# Patient Record
Sex: MALE | Race: WHITE
[De-identification: names, ages, dates, MRNs, and addresses within clinical notes are randomized per-mention and may not be internally consistent; named-entity substitution may affect disease eponyms.]

---

## 2019-07-12 ENCOUNTER — HOSPITAL ENCOUNTER (OUTPATIENT)
Dept: HOSPITAL 63 - DXRAD | Age: 54
Discharge: HOME | End: 2019-07-12
Payer: COMMERCIAL

## 2019-07-12 DIAGNOSIS — I87.8: ICD-10-CM

## 2019-07-12 DIAGNOSIS — M25.851: ICD-10-CM

## 2019-07-12 DIAGNOSIS — M12.852: ICD-10-CM

## 2019-07-12 DIAGNOSIS — M12.851: Primary | ICD-10-CM

## 2019-07-12 PROCEDURE — 73501 X-RAY EXAM HIP UNI 1 VIEW: CPT

## 2019-07-12 NOTE — RAD
One view the pelvis and one view right hip

 

HISTORY: Right hip pain

 

COMPARISON: None

 

FINDINGS:

 

The iliopectineal line is intact. Joint space narrowing of both hips are 

noted greater on the right with subchondral sclerosis and cyst formation 

seen mostly on the right. No acute fracture or dislocation. Phleboliths 

are present.

 

IMPRESSION:

 

No acute fracture or dislocation

 

Bilateral hip arthropathy greater on the right

 

Electronically signed by: Dallas Rausch MD (7/12/2019 4:36 PM) [facility]

## 2019-07-17 NOTE — [facility]
Examination: Fluoro Guided Therapeutic injection right hip.  

 

History: Right hip pain

 

Findings:

 

Relative benefits risks and alternatives to the procedure were discussed 

and verbal and written informed consent was obtained.  The patient was 

carefully prepped and draped in a sterile fashion.  Lidocaine was used for

local anesthesia. A 22-gauge spinal needle was  advanced to the level of 

the hip joint at the femoral neck. A mixture of 4 mL of lidocaine, 4 mL of

Omnipaque 300,  4ml bupivacaine and 80 mg of Depo-Medrol was injected.

 

Total fluoroscopic time 23 seconds. Total fluoroscopic images 1.

 

No immediate complications.

 

IMPRESSION:

 

Therapeutic right hip steroid injection.

 

Electronically signed by: Humza Perez MD (7/17/2019 3:33 PM) [id number]

## 2020-08-25 NOTE — KC[facility]
Examination: Fluoro Guided Therapeutic injection bilateral hips 

 

History: Bilateral hip pain

 

COMPARISON: 7/17/2019

 

Findings:

 

 

Relative benefits risks and alternatives to the procedure were discussed 

and verbal and written informed consent was obtained.  The patient was 

carefully prepped and draped in a sterile fashion.  Lidocaine was used for

local anesthesia. A 22-gauge spinal needle was  advanced to the level of 

the hip joint at the femoral neck. A mixture of 40 Ramirez-Marco Antonio and, 4

mL of bupivacaine, 4 mL of Omnipaque, 80 mg of Depo-Medrol was injected in

the right and left hip joints. Digital image was obtained  showing 

contrast in the joint. The left hip injection joint image could not be 

saved.

 

No complications.

 

Impression:  

 

Therapeutic injection bilateral hips.  

 

48 seconds fluoroscopy for right hip and 23 seconds of fluoroscopy for 

left hip. 

 

1 fluoroscopic image.

 

Electronically signed by: Humza Perez MD (8/25/2020 5:02 PM) [id number]

## 2021-07-26 ENCOUNTER — HOSPITAL ENCOUNTER (INPATIENT)
Dept: HOSPITAL 63 - ER | Age: 56
LOS: 1 days | Discharge: ANOTHER HOSPITAL | DRG: 177 | End: 2021-07-27
Attending: HOSPITALIST | Admitting: HOSPITALIST
Payer: COMMERCIAL

## 2021-07-26 VITALS — SYSTOLIC BLOOD PRESSURE: 123 MMHG | DIASTOLIC BLOOD PRESSURE: 86 MMHG

## 2021-07-26 VITALS — DIASTOLIC BLOOD PRESSURE: 90 MMHG | SYSTOLIC BLOOD PRESSURE: 122 MMHG

## 2021-07-26 VITALS — DIASTOLIC BLOOD PRESSURE: 93 MMHG | SYSTOLIC BLOOD PRESSURE: 130 MMHG

## 2021-07-26 VITALS — WEIGHT: 230.38 LBS | BODY MASS INDEX: 31.2 KG/M2 | HEIGHT: 72 IN

## 2021-07-26 DIAGNOSIS — R43.2: ICD-10-CM

## 2021-07-26 DIAGNOSIS — J12.82: ICD-10-CM

## 2021-07-26 DIAGNOSIS — J96.01: ICD-10-CM

## 2021-07-26 DIAGNOSIS — M19.90: ICD-10-CM

## 2021-07-26 DIAGNOSIS — U07.1: Primary | ICD-10-CM

## 2021-07-26 DIAGNOSIS — Z90.49: ICD-10-CM

## 2021-07-26 LAB
ALBUMIN SERPL-MCNC: 3.2 G/DL (ref 3.4–5)
ALBUMIN/GLOB SERPL: 0.8 {RATIO} (ref 1–1.7)
ALP SERPL-CCNC: 60 U/L (ref 46–116)
ALT SERPL-CCNC: 49 U/L (ref 16–63)
ANION GAP SERPL CALC-SCNC: 12 MMOL/L (ref 6–14)
AST SERPL-CCNC: 61 U/L (ref 15–37)
BASOPHILS # BLD AUTO: 0 X10^3/UL (ref 0–0.2)
BASOPHILS NFR BLD: 0 % (ref 0–3)
BILIRUB SERPL-MCNC: 0.8 MG/DL (ref 0.2–1)
BUN/CREAT SERPL: 18 (ref 6–20)
CA-I SERPL ISE-MCNC: 20 MG/DL (ref 8–26)
CALCIUM SERPL-MCNC: 8.6 MG/DL (ref 8.5–10.1)
CHLORIDE SERPL-SCNC: 101 MMOL/L (ref 98–107)
CO2 SERPL-SCNC: 25 MMOL/L (ref 21–32)
CREAT SERPL-MCNC: 1.1 MG/DL (ref 0.7–1.3)
EOSINOPHIL NFR BLD: 0 % (ref 0–3)
EOSINOPHIL NFR BLD: 0 X10^3/UL (ref 0–0.7)
ERYTHROCYTE [DISTWIDTH] IN BLOOD BY AUTOMATED COUNT: 13.2 % (ref 11.5–14.5)
GFR SERPLBLD BASED ON 1.73 SQ M-ARVRAT: 69.2 ML/MIN
GLOBULIN SER-MCNC: 4 G/DL (ref 2.2–3.8)
GLUCOSE SERPL-MCNC: 135 MG/DL (ref 70–99)
HCT VFR BLD CALC: 46.9 % (ref 39–53)
HGB BLD-MCNC: 16.3 G/DL (ref 13–17.5)
LYMPHOCYTES # BLD: 0.5 X10^3/UL (ref 1–4.8)
LYMPHOCYTES NFR BLD AUTO: 6 % (ref 24–48)
MCH RBC QN AUTO: 32 PG (ref 25–35)
MCHC RBC AUTO-ENTMCNC: 35 G/DL (ref 31–37)
MCV RBC AUTO: 93 FL (ref 79–100)
MONO #: 0.3 X10^3/UL (ref 0–1.1)
MONOCYTES NFR BLD: 4 % (ref 0–9)
NEUT #: 6.6 X10^3UL (ref 1.8–7.7)
NEUTROPHILS NFR BLD AUTO: 90 % (ref 31–73)
PLATELET # BLD AUTO: 169 X10^3/UL (ref 140–400)
POTASSIUM SERPL-SCNC: 3.4 MMOL/L (ref 3.5–5.1)
PROT SERPL-MCNC: 7.2 G/DL (ref 6.4–8.2)
RBC # BLD AUTO: 5.06 X10^6/UL (ref 4.3–5.7)
SODIUM SERPL-SCNC: 138 MMOL/L (ref 136–145)
WBC # BLD AUTO: 7.4 X10^3/UL (ref 4–11)

## 2021-07-26 PROCEDURE — 87040 BLOOD CULTURE FOR BACTERIA: CPT

## 2021-07-26 PROCEDURE — 71045 X-RAY EXAM CHEST 1 VIEW: CPT

## 2021-07-26 PROCEDURE — 80053 COMPREHEN METABOLIC PANEL: CPT

## 2021-07-26 PROCEDURE — 36415 COLL VENOUS BLD VENIPUNCTURE: CPT

## 2021-07-26 PROCEDURE — 85025 COMPLETE CBC W/AUTO DIFF WBC: CPT

## 2021-07-26 PROCEDURE — 96374 THER/PROPH/DIAG INJ IV PUSH: CPT

## 2021-07-26 PROCEDURE — 83605 ASSAY OF LACTIC ACID: CPT

## 2021-07-26 PROCEDURE — 82803 BLOOD GASES ANY COMBINATION: CPT

## 2021-07-26 PROCEDURE — U0003 INFECTIOUS AGENT DETECTION BY NUCLEIC ACID (DNA OR RNA); SEVERE ACUTE RESPIRATORY SYNDROME CORONAVIRUS 2 (SARS-COV-2) (CORONAVIRUS DISEASE [COVID-19]), AMPLIFIED PROBE TECHNIQUE, MAKING USE OF HIGH THROUGHPUT TECHNOLOGIES AS DESCRIBED BY CMS-2020-01-R: HCPCS

## 2021-07-26 RX ADMIN — FAMOTIDINE SCH MG: 10 INJECTION, SOLUTION INTRAVENOUS at 16:21

## 2021-07-26 RX ADMIN — ZINC SULFATE CAP 220 MG (50 MG ELEMENTAL ZN) SCH MG: 220 (50 ZN) CAP at 16:19

## 2021-07-26 RX ADMIN — Medication SCH MG: at 16:19

## 2021-07-26 RX ADMIN — IPRATROPIUM BROMIDE AND ALBUTEROL SCH PUFF: 20; 100 SPRAY, METERED RESPIRATORY (INHALATION) at 21:14

## 2021-07-26 RX ADMIN — ENOXAPARIN SODIUM SCH MG: 100 INJECTION SUBCUTANEOUS at 16:19

## 2021-07-26 RX ADMIN — METHYLPREDNISOLONE SODIUM SUCCINATE SCH MG: 125 INJECTION, POWDER, FOR SOLUTION INTRAMUSCULAR; INTRAVENOUS at 21:15

## 2021-07-26 RX ADMIN — ALBUTEROL SULFATE PRN PUFF: 90 AEROSOL, METERED RESPIRATORY (INHALATION) at 21:15

## 2021-07-26 RX ADMIN — FAMOTIDINE SCH MG: 10 INJECTION, SOLUTION INTRAVENOUS at 21:14

## 2021-07-26 RX ADMIN — CETIRIZINE HYDROCHLORIDE SCH MG: 10 TABLET, FILM COATED ORAL at 16:19

## 2021-07-26 RX ADMIN — ACETAMINOPHEN PRN MG: 325 TABLET, FILM COATED ORAL at 16:19

## 2021-07-26 NOTE — HP
ADMIT DATE: 07/26/2021

ATTENDING PHYSICIAN:  Dr. Hagan.



CHIEF COMPLAINT:  Shortness of breath.



HISTORY OF PRESENT ILLNESS:  The patient is a 56-year-old gentleman, otherwise 

healthy, daughter has been ill for the last 4 days.  He has had cough, 

congestion, fevers, shortness of breath, and nonproductive cough.  He tested 

positive for most likely the delta variant coronavirus.  He had a flu-like 

symptom last 03/2020 and he thought it was COVID at that time.  He has not had 

his vaccinations.  He is a nonsmoker and nondrinker.  In the ED, the workup 

showed that he was hypoxic.  He required 2 liters of nasal cannula to maintain 

sats of 93%.  The chest x-ray showed evidence of mild ill-defined mid and 

bibasilar opacities consistent with a viral pneumonia.  He is admitted then with

COVID pneumonia and acute respiratory failure.



PAST MEDICAL HISTORY:  Significant for some degenerative arthritis.  He has had 

an appendectomy.  No history of heart disease.



SOCIAL HISTORY:  Nonsmoker and nondrinker.  He is retired, working for the 

[employer].  He is an avid mehran.  He is in good shape otherwise.



CURRENT MEDICATIONS:  None.



ALLERGIES:  No known drug allergies.



FAMILY HISTORY:  He is .  Children are grown.  He has 5 kids.



REVIEW OF SYSTEMS:  Significant for the myalgias.  He has lost sense of taste, 

low-grade fevers, congestion, shortness of breath and dyspnea with minimal 

exertion.



PHYSICAL EXAMINATION:

GENERAL:  When I saw him, this is a pleasant gentleman who was fairly alert.  He

did not appear to be toxic.

VITAL SIGNS:  His initial vital signs showed a blood pressure of 122/90, pulse 

is 83 and regular, temperature 99.1 degrees Fahrenheit, oxygen saturation 94% on

2 liters by nasal cannula.

HEENT:  Head is without trauma.  Pupils are reactive.  Sclerae nonicteric.  

Oropharynx is clear.

NECK:  Supple.  No bruits identified.

LUNGS:  Otherwise, he has good breath sounds.  He has diminished breath sounds 

at the bases with some rhonchi.

CARDIOVASCULAR:  Showed regular heart tones.  No gallops.

ABDOMEN:  Soft.

EXTREMITIES:  Without edema.

NEUROLOGIC:  Focally intact.

SKIN:  Warm and dry.



PERTINENT LABORATORY STUDIES:  His hemoglobin was 16.3 g/dL with a white count 

of 7400.  Potassium was 3.4 mEq, sodium 138 mEq, nonfasting blood sugar is 135 

mg/dL.



ASSESSMENT:

1.  A 56-year-old gentleman with COVID-19 coronavirus pneumonia bilaterally.

2.  Hypoxemia, requiring some supplemental oxygen.

3.  Dysgeusia.



PLAN:

1.  Admit to the inpatient unit.

2.  Empiric Solu-Medrol.

3.  Remdesivir has been shown not to be beneficial and will not be considered.

4.  Empiric Lovenox.

5.  Elemental zinc.

6.  Nebulizer therapy.

7.  Diet as tolerated.







CALLI

DR: Armand   DD: 07/26/2021 16:26

DT: 07/26/2021 16:36   TID: [id number]

CC:     Clive Bell

## 2021-07-26 NOTE — RAD
XR CHEST 1V



History: Reason: sob, covid exposure / Spl. Instructions:  / History: 



Comparison: None.



Findings:

Mild ill-defined mid and bibasilar opacities. No pleural effusion. No pneumothorax. Normal heart size
.



Impression: 

1.  Mild ill-defined mid and bibasilar opacities, can be seen with viral pneumonia.



Electronically signed by: Ramone Mcneal DO (7/26/2021 11:46 AM) [id number]

## 2021-07-26 NOTE — PHYS DOC
Past History


Past Surgical History:  Appendectomy





General Adult


EDM:


Chief Complaint:  SHORTNESS OF BREATH





HPI:


HPI:





Patient is a 56 year old male who presents with a week of fever/chills, 

diaphoresis, generalized fatigue, myalgias, headache, cough.  Over the past few 

days has developed worsening shortness of breath.  Was exposed to his son who 

tested positive for Covid.  He had a negative rapid test on day 1 of symptoms, 

and has not been tested again.  Prehospital had an oxygen saturation of 82%, so 

was brought into the emergency department for further evaluation.  Denies any 

chest pain.





Review of Systems:


Review of Systems:


Constitutional:  + fever, chills, myalgias, diaphoresis.  


Eyes:  Denies change in visual acuity 


HENT:  Denies nasal congestion or sore throat 


Respiratory:  + cough and SOB 


Cardiovascular:  Denies chest pain or edema 


GI:  + Diarrhea. Denies abdominal pain, nausea, vomiting, bloody stools or 

diarrhea 


: Denies dysuria 


Musculoskeletal:  Denies back pain or joint pain 


Integument:  Denies rash 


Neurologic:  Denies headache, focal weakness or sensory changes 


Endocrine:  Denies polyuria or polydipsia 


Lymphatic:  Denies swollen glands 


Psychiatric:  Denies depression or anxiety





Family History:


Family History:


Sinus positive for Covid last week.  No other pertinent family history.





Allergies:


Allergies:





Allergies








Coded Allergies Type Severity Reaction Last Updated Verified


 


  No Known Drug Allergies    21 No











Physical Exam:


PE:





Constitutional: Diaphoretic and ill-appearing.. []


HENT: Normocephalic, atraumatic, bilateral external ears normal, oropharynx 

moist, no oral exudates, nose normal. []


Eyes: PERRLA, EOMI, conjunctiva normal, no discharge. [] 


Neck: Normal range of motion, no tenderness, supple, no stridor. [] 


Cardiovascular:Heart rate regular rhythm, no murmur []


Lungs & Thorax: Tachypneic.  Bilateral crackles.  []


Abdomen: Bowel sounds normal, soft, no tenderness, no masses, no pulsatile 

masses. [] 


Skin: Warm, dry, no erythema, no rash. [] 


Back: No tenderness, no CVA tenderness. [] 


Extremities: No tenderness, no cyanosis, no clubbing, ROM intact, no edema. [] 


Neurologic: Alert and oriented X 3, normal motor function, normal sensory 

function, no focal deficits noted. []


Psychologic: Affect normal, judgement normal, mood normal. []





Current Patient Data:


Labs:





                                Laboratory Tests








Test


 21


09:50


 


White Blood Count


 7.4 x10^3/uL


(4.0-11.0)


 


Red Blood Count


 5.06 x10^6/uL


(4.30-5.70)


 


Hemoglobin


 16.3 g/dL


(13.0-17.5)


 


Hematocrit


 46.9 %


(39.0-53.0)


 


Mean Corpuscular Volume


 93 fL ()





 


Mean Corpuscular Hemoglobin 32 pg (25-35)  


 


Mean Corpuscular Hemoglobin


Concent 35 g/dL


(31-37)


 


Red Cell Distribution Width


 13.2 %


(11.5-14.5)


 


Platelet Count


 169 x10^3/uL


(140-400)


 


Neutrophils (%) (Auto) 90 % (31-73)  H


 


Lymphocytes (%) (Auto) 6 % (24-48)  L


 


Monocytes (%) (Auto) 4 % (0-9)  


 


Eosinophils (%) (Auto) 0 % (0-3)  


 


Basophils (%) (Auto) 0 % (0-3)  


 


Neutrophils # (Auto)


 6.6 x10^3uL


(1.8-7.7)


 


Lymphocytes # (Auto)


 0.5 x10^3/uL


(1.0-4.8)  L


 


Monocytes # (Auto)


 0.3 x10^3/uL


(0.0-1.1)


 


Eosinophils # (Auto)


 0.0 x10^3/uL


(0.0-0.7)


 


Basophils # (Auto)


 0.0 x10^3/uL


(0.0-0.2)


 


Sodium Level


 138 mmol/L


(136-145)


 


Potassium Level


 3.4 mmol/L


(3.5-5.1)  L


 


Chloride Level


 101 mmol/L


()


 


Carbon Dioxide Level


 25 mmol/L


(21-32)


 


Anion Gap 12 (6-14)  


 


Blood Urea Nitrogen


 20 mg/dL


(8-26)


 


Creatinine


 1.1 mg/dL


(0.7-1.3)


 


Estimated GFR


(Cockcroft-Gault) 69.2  





 


BUN/Creatinine Ratio 18 (6-20)  


 


Glucose Level


 135 mg/dL


(70-99)  H


 


Lactic Acid Level


 1.6 mmol/L


(0.4-2.0)


 


Calcium Level


 8.6 mg/dL


(8.5-10.1)


 


Total Bilirubin


 0.8 mg/dL


(0.2-1.0)


 


Aspartate Amino Transferase


(AST) 61 U/L (15-37)


H


 


Alanine Aminotransferase (ALT)


 49 U/L (16-63)





 


Alkaline Phosphatase


 60 U/L


()


 


Total Protein


 7.2 g/dL


(6.4-8.2)


 


Albumin


 3.2 g/dL


(3.4-5.0)  L


 


Albumin/Globulin Ratio


 0.8 (1.0-1.7)


L








Vital Signs:





Laboratory Tests








Test


 21


09:50


 


White Blood Count 7.4 x10^3/uL 


 


Red Blood Count 5.06 x10^6/uL 


 


Hemoglobin 16.3 g/dL 


 


Hematocrit 46.9 % 


 


Mean Corpuscular Volume 93 fL 


 


Mean Corpuscular Hemoglobin 32 pg 


 


Mean Corpuscular Hemoglobin


Concent 35 g/dL 





 


Red Cell Distribution Width 13.2 % 


 


Platelet Count 169 x10^3/uL 


 


Neutrophils (%) (Auto) 90 % 


 


Lymphocytes (%) (Auto) 6 % 


 


Monocytes (%) (Auto) 4 % 


 


Eosinophils (%) (Auto) 0 % 


 


Basophils (%) (Auto) 0 % 


 


Neutrophils # (Auto) 6.6 x10^3uL 


 


Lymphocytes # (Auto) 0.5 x10^3/uL 


 


Monocytes # (Auto) 0.3 x10^3/uL 


 


Eosinophils # (Auto) 0.0 x10^3/uL 


 


Basophils # (Auto) 0.0 x10^3/uL 


 


Sodium Level 138 mmol/L 


 


Potassium Level 3.4 mmol/L 


 


Chloride Level 101 mmol/L 


 


Carbon Dioxide Level 25 mmol/L 


 


Anion Gap 12 


 


Blood Urea Nitrogen 20 mg/dL 


 


Creatinine 1.1 mg/dL 


 


Estimated GFR


(Cockcroft-Gault) 69.2 





 


BUN/Creatinine Ratio 18 


 


Glucose Level 135 mg/dL 


 


Lactic Acid Level 1.6 mmol/L 


 


Calcium Level 8.6 mg/dL 


 


Total Bilirubin 0.8 mg/dL 


 


Aspartate Amino Transf


(AST/SGOT) 61 U/L 





 


Alanine Aminotransferase


(ALT/SGPT) 49 U/L 





 


Alkaline Phosphatase 60 U/L 


 


Total Protein 7.2 g/dL 


 


Albumin 3.2 g/dL 


 


Albumin/Globulin Ratio 0.8 








Current Medications








 Medications


  (Trade)  Dose


 Ordered  Sig/Shayy


 Route


 PRN Reason  Start Time


 Stop Time Status Last Admin


Dose Admin


 


 Methylprednisolone


 Sodium Succinate


  (SOLU-Medrol


 40MG VIAL)  80 mg  1X  ONCE


 IV


   21 11:45


 21 11:47 DC 21 12:01











                                   Vital Signs








  Date Time  Temp Pulse Resp B/P (MAP) Pulse Ox O2 Delivery O2 Flow Rate FiO2


 


21 11:06  84 20 117/69 (85) 92 Nasal Cannula 2.0 


 


21 09:45 98.5       











EKG:


EKG:


[]





Radiology/Procedures:


Radiology/Procedures:


[facility] [address], [place] [address] [phone]


                                        


                                 IMAGING REPORT





                                     Signed





PATIENT: TYE ADDISON PACCOUNT: [id number]     MRN#: [id number]


: 1965           LOCATION: ER              AGE: 56


SEX: M                    EXAM DT: 21         ACCESSION#: [id number]


STATUS: REG ER            ORD. PHYSICIAN: NALDO MARTINEZ MD


REASON: sob, covid exposure


PROCEDURE: CHEST AP ONLY





XR CHEST 1V





History: Reason: sob, covid exposure / Spl. Instructions:  / History: 





Comparison: None.





Findings:


Mild ill-defined mid and bibasilar opacities. No pleural effusion. No 

pneumothorax. Normal heart size.





Impression: 


1.  Mild ill-defined mid and bibasilar opacities, can be seen with viral 

pneumonia.





Electronically signed by: Ramone Mcneal DO (2021 11:46 AM) [id number]














DICTATED AND SIGNED BY:     RAMONE MCNEAL DO


DATE:     21 1146





CC: NALDO MARTINEZ MD; SHERMAN SADLER MD ~MTH0 0


[]





Heart Score:


C/O Chest Pain:  N/A


Risk Factors:


Risk Factors:  DM, Current or recent (<one month) smoker, HTN, HLP, family 

history of CAD, obesity.


Risk Scores:


Score 0 - 3:  2.5% MACE over next 6 weeks - Discharge Home


Score 4 - 6:  20.3% MACE over next 6 weeks - Admit for Clinical Observation


Score 7 - 10:  72.7% MACE over next 6 weeks - Early Invasive Strategies





Course & Med Decision Making:


Course & Med Decision Making


Pertinent Labs and Imaging studies reviewed. (See chart for details)





Patient is an otherwise healthy 56-year-old male who presents with several days 

of symptoms consistent with COVID-19 pneumonia in the setting of exposure to 

household contact.  He is not vaccinated.  He is hypoxic to the 80s on room air 

and is requiring 2 L/min nasal cannula to maintain oxygenation.  I have given 

him IV steroids given his O2 requirement.  I have tested him for Covid.  His 

presentation is very consistent with a viral pneumonia, I have held off on 

antibiotics at this time.  He has been admitted to the hospitalist for further 

management.





Dragon Disclaimer:


Dragon Disclaimer:


This electronic medical record was generated, in whole or in part, using a voice

 recognition dictation system.





Departure


Departure:


Impression:  


   Primary Impression:  


   Viral pneumonia


   Additional Impression:  


   Suspected COVID-19 virus infection


Disposition:   ADMITTED AS INPATIENT


Admitting Physician:  Toni Hagan


Condition:  STABLE


Referrals:  


SHERMAN SADLER MD (PCP)











NALDO MARTINEZ MD              2021 11:46

## 2021-07-27 ENCOUNTER — HOSPITAL ENCOUNTER (INPATIENT)
Dept: HOSPITAL 61 - 2 SOUTH | Age: 56
LOS: 29 days | Discharge: HOME | DRG: 871 | End: 2021-08-25
Attending: INTERNAL MEDICINE | Admitting: INTERNAL MEDICINE
Payer: COMMERCIAL

## 2021-07-27 VITALS — DIASTOLIC BLOOD PRESSURE: 85 MMHG | SYSTOLIC BLOOD PRESSURE: 145 MMHG

## 2021-07-27 VITALS — HEIGHT: 72 IN | WEIGHT: 220.68 LBS | BODY MASS INDEX: 29.89 KG/M2

## 2021-07-27 VITALS — SYSTOLIC BLOOD PRESSURE: 137 MMHG | DIASTOLIC BLOOD PRESSURE: 81 MMHG

## 2021-07-27 VITALS — DIASTOLIC BLOOD PRESSURE: 89 MMHG | SYSTOLIC BLOOD PRESSURE: 149 MMHG

## 2021-07-27 VITALS — DIASTOLIC BLOOD PRESSURE: 85 MMHG | SYSTOLIC BLOOD PRESSURE: 126 MMHG

## 2021-07-27 VITALS — SYSTOLIC BLOOD PRESSURE: 154 MMHG | DIASTOLIC BLOOD PRESSURE: 90 MMHG

## 2021-07-27 VITALS — DIASTOLIC BLOOD PRESSURE: 77 MMHG | SYSTOLIC BLOOD PRESSURE: 116 MMHG

## 2021-07-27 DIAGNOSIS — M19.90: ICD-10-CM

## 2021-07-27 DIAGNOSIS — E66.9: ICD-10-CM

## 2021-07-27 DIAGNOSIS — D72.810: ICD-10-CM

## 2021-07-27 DIAGNOSIS — E87.6: ICD-10-CM

## 2021-07-27 DIAGNOSIS — J96.01: ICD-10-CM

## 2021-07-27 DIAGNOSIS — U07.1: ICD-10-CM

## 2021-07-27 DIAGNOSIS — Z79.899: ICD-10-CM

## 2021-07-27 DIAGNOSIS — E43: ICD-10-CM

## 2021-07-27 DIAGNOSIS — J12.82: ICD-10-CM

## 2021-07-27 DIAGNOSIS — A41.89: Primary | ICD-10-CM

## 2021-07-27 DIAGNOSIS — F41.9: ICD-10-CM

## 2021-07-27 LAB
BGAS PCO2: 38 MMHG (ref 35–46)
BGAS PH: 7.46 (ref 7.35–7.46)
BGAS PO2: 53 MMHG (ref 80–100)
DELTA BASE BGAS: 4 MMOL/L (ref 0–3)
O2 SAT BGAS: 89 % (ref 92–99)
O2/TOTAL GAS SETTING VFR VENT: 80 %

## 2021-07-27 PROCEDURE — 86140 C-REACTIVE PROTEIN: CPT

## 2021-07-27 PROCEDURE — G0378 HOSPITAL OBSERVATION PER HR: HCPCS

## 2021-07-27 PROCEDURE — 94760 N-INVAS EAR/PLS OXIMETRY 1: CPT

## 2021-07-27 PROCEDURE — 94618 PULMONARY STRESS TESTING: CPT

## 2021-07-27 PROCEDURE — 94660 CPAP INITIATION&MGMT: CPT

## 2021-07-27 PROCEDURE — 36415 COLL VENOUS BLD VENIPUNCTURE: CPT

## 2021-07-27 PROCEDURE — 82728 ASSAY OF FERRITIN: CPT

## 2021-07-27 PROCEDURE — 36600 WITHDRAWAL OF ARTERIAL BLOOD: CPT

## 2021-07-27 PROCEDURE — 82962 GLUCOSE BLOOD TEST: CPT

## 2021-07-27 PROCEDURE — 83880 ASSAY OF NATRIURETIC PEPTIDE: CPT

## 2021-07-27 PROCEDURE — 80053 COMPREHEN METABOLIC PANEL: CPT

## 2021-07-27 PROCEDURE — 5A0935A ASSISTANCE WITH RESPIRATORY VENTILATION, LESS THAN 24 CONSECUTIVE HOURS, HIGH NASAL FLOW/VELOCITY: ICD-10-PCS | Performed by: INTERNAL MEDICINE

## 2021-07-27 PROCEDURE — 85007 BL SMEAR W/DIFF WBC COUNT: CPT

## 2021-07-27 PROCEDURE — 82805 BLOOD GASES W/O2 SATURATION: CPT

## 2021-07-27 PROCEDURE — 5A0935A ASSISTANCE WITH RESPIRATORY VENTILATION, LESS THAN 24 CONSECUTIVE HOURS, HIGH NASAL FLOW/VELOCITY: ICD-10-PCS | Performed by: HOSPITALIST

## 2021-07-27 PROCEDURE — 71045 X-RAY EXAM CHEST 1 VIEW: CPT

## 2021-07-27 PROCEDURE — 80048 BASIC METABOLIC PNL TOTAL CA: CPT

## 2021-07-27 PROCEDURE — 85025 COMPLETE CBC W/AUTO DIFF WBC: CPT

## 2021-07-27 PROCEDURE — 85027 COMPLETE CBC AUTOMATED: CPT

## 2021-07-27 RX ADMIN — ALBUTEROL SULFATE PRN PUFF: 90 AEROSOL, METERED RESPIRATORY (INHALATION) at 12:11

## 2021-07-27 RX ADMIN — IPRATROPIUM BROMIDE AND ALBUTEROL SCH PUFF: 20; 100 SPRAY, METERED RESPIRATORY (INHALATION) at 08:06

## 2021-07-27 RX ADMIN — METHYLPREDNISOLONE SODIUM SUCCINATE SCH MG: 125 INJECTION, POWDER, FOR SOLUTION INTRAMUSCULAR; INTRAVENOUS at 13:14

## 2021-07-27 RX ADMIN — METHYLPREDNISOLONE SODIUM SUCCINATE SCH MG: 125 INJECTION, POWDER, FOR SOLUTION INTRAMUSCULAR; INTRAVENOUS at 21:22

## 2021-07-27 RX ADMIN — ZINC SULFATE CAP 220 MG (50 MG ELEMENTAL ZN) SCH MG: 220 (50 ZN) CAP at 08:06

## 2021-07-27 RX ADMIN — Medication SCH MG: at 08:08

## 2021-07-27 RX ADMIN — IPRATROPIUM BROMIDE AND ALBUTEROL SCH PUFF: 20; 100 SPRAY, METERED RESPIRATORY (INHALATION) at 16:01

## 2021-07-27 RX ADMIN — IPRATROPIUM BROMIDE AND ALBUTEROL SCH PUFF: 20; 100 SPRAY, METERED RESPIRATORY (INHALATION) at 12:11

## 2021-07-27 RX ADMIN — CETIRIZINE HYDROCHLORIDE SCH MG: 10 TABLET, FILM COATED ORAL at 08:06

## 2021-07-27 RX ADMIN — FAMOTIDINE SCH MG: 10 INJECTION, SOLUTION INTRAVENOUS at 08:07

## 2021-07-27 RX ADMIN — ENOXAPARIN SODIUM SCH MG: 100 INJECTION SUBCUTANEOUS at 13:15

## 2021-07-27 RX ADMIN — FAMOTIDINE SCH MG: 20 TABLET ORAL at 21:22

## 2021-07-27 RX ADMIN — IPRATROPIUM BROMIDE AND ALBUTEROL SCH PUFF: 20; 100 SPRAY, METERED RESPIRATORY (INHALATION) at 21:00

## 2021-07-27 RX ADMIN — ENOXAPARIN SODIUM SCH MG: 40 INJECTION SUBCUTANEOUS at 21:22

## 2021-07-27 RX ADMIN — ACETAMINOPHEN PRN MG: 325 TABLET, FILM COATED ORAL at 09:16

## 2021-07-27 RX ADMIN — METHYLPREDNISOLONE SODIUM SUCCINATE SCH MG: 125 INJECTION, POWDER, FOR SOLUTION INTRAMUSCULAR; INTRAVENOUS at 05:32

## 2021-07-27 NOTE — NUR
Pt A&Ox4, very pleasant and cooperative with cares and assessments. Pt ate HS snack 
independently. Pt slept "decent during the night, had a few coughing spells." Pt without 
fever or need for O2 increase, maintained O2 sat at 91-94% on 3L NC. Pt +Covid. Pt refused 
shower this AM, stating "I need to have a bowel movement before I shower...can't have dirt 
in my ditch all day."

## 2021-07-27 NOTE — NUR
PATIENT IS IN A BED AWAKE UPON ASSESSMENT THIS AM, CALM AND COOPERATIVE. O2 SAT ASSESSED, 
PATIENT IS ON 3L OF O2 VIA NC WITH SAT 86%, SUPPLEMENTAL O2 INCREASED UP TO 6 L WITHOUT ANY 
O2 SAT IMPROVEMENT. DOCTOR HO NOTIFIED. PATIENT IS PLACED ON NON REBREATHER AT 12L OF 
OXYGEN, PATIENT IS CURRENTLY IS AT 91% AT REST.

## 2021-07-27 NOTE — NUR
PATIENT IS TRANSFERRED TO [facility] FOR HIGHER LEVEL OF CARE. REPORT IS GIVEN TO MANISH GILL. 
PATIENT LEFT ROOM 103 VIA EMS.

## 2021-07-27 NOTE — NUR
Pt in bed assessment completed vss poc explained pt denied pain but c/o upset stomach pt a 
little anxious will resume care and continue to monitor pt. Call light in reach.

## 2021-07-27 NOTE — DS
DATE OF DISCHARGE: 07/27/2021

ATTENDING PHYSICIAN:  Dr. Hagan.



FINAL DISCHARGE DIAGNOSES:

1.  Bilateral COVID-19 coronavirus pneumonia.

2.  Acute hypoxemic respiratory failure.

3.  Dysgeusia.



HISTORY AND PHYSICAL: The patient is a pleasant, healthy 56-year-old gentleman 

presenting with a 3-day history of shortness of breath, nonproductive cough, 

fevers, and most likely the delta variant of the coronavirus.  He had flu-like 

symptoms last year in March.  He thought he had COVID at that time. He did not 

get vaccination assuming that he had natural antibodies.  He is a nonsmoker, 

nondrinker.  Otherwise, fairly healthy. On this episode, a daughter as well as a

son also came down with similar symptoms.



PAST MEDICAL HISTORY:  He has had an appendectomy.  He had some mild arthritis. 

There is no history of diabetes or heart disease.  He has no known drug 

allergies.  He is not on any current medicines.



PHYSICAL EXAMINATION:  Please see the dictated note.



PERTINENT LABORATORY AND X-RAY STUDIES:  Admission hemoglobin was 16.3 g/dL with

a white count of 7400.  Electrolytes within normal range.  Creatinine is 1.1 mg 

percent.  Transaminases are normal.  Lactic acid was 1.6.  Serology was indeed 

positive for the COVID coronavirus by PCR technology. On the second hospital 

day, he had arterial blood gases drawn on 80% oxygen, pH was 7.46, pCO2 of 38, 

and pO2 of only 53 mmHg suggesting a significant shunt.



COURSE IN THE HOSPITAL:  The patient was admitted to our hospital. He was given 

supplemental oxygen, a metered dose inhaler or elemental zinc, empiric Lovenox 

and also intravenous Solu-Medrol.  He felt all right throughout the night, but 

his oxygen saturations did not and gradually since admission he required a 

higher and higher oxygen liter flow to maintain adequate saturations.  By the 

second hospital day, arterial blood gas showed a significant shunt with a pO2 of

53 mmHg on 80% oxygen.  Therefore, in discussion with the patient, arrangements 

were made for transfer to a higher level of care.  I had arrangements for him to

go to [facility] initially to the telemetry unit with anticipation that

should things get worse, he may need further intervention and intubation of the 

trachea.  We are not at that stage yet, but it is certainly a possibility given 

his decline.  Therefore, on the second hospital day, the patient was transferred

to [facility] for further treatment of acute respiratory failure 

related to his pneumonia.  He will continue high flow oxygen, Solu-Medrol, 

breathing treatments and p.o. zinc.  Regular diet as tolerated.  His prognosis 

is guarded.  The patient was then discharged from our hospital in stable 

condition with explicit drug and followup care at [facility].  I 

spoke with the hospitalist on call.  He will be admitted to the hospitalist 

service there.



Total discharge time spent was 41 minutes.







XENA

DR: Armand   DD: 07/27/2021 12:37

DT: 07/27/2021 13:06   TID: [id number]

CC:     Dr. Clive Bell

## 2021-07-28 VITALS — SYSTOLIC BLOOD PRESSURE: 126 MMHG | DIASTOLIC BLOOD PRESSURE: 79 MMHG

## 2021-07-28 VITALS — SYSTOLIC BLOOD PRESSURE: 130 MMHG | DIASTOLIC BLOOD PRESSURE: 84 MMHG

## 2021-07-28 VITALS — SYSTOLIC BLOOD PRESSURE: 143 MMHG | DIASTOLIC BLOOD PRESSURE: 85 MMHG

## 2021-07-28 VITALS — DIASTOLIC BLOOD PRESSURE: 91 MMHG | SYSTOLIC BLOOD PRESSURE: 148 MMHG

## 2021-07-28 VITALS — DIASTOLIC BLOOD PRESSURE: 85 MMHG | SYSTOLIC BLOOD PRESSURE: 149 MMHG

## 2021-07-28 VITALS — DIASTOLIC BLOOD PRESSURE: 83 MMHG | SYSTOLIC BLOOD PRESSURE: 157 MMHG

## 2021-07-28 PROCEDURE — 5A0935A ASSISTANCE WITH RESPIRATORY VENTILATION, LESS THAN 24 CONSECUTIVE HOURS, HIGH NASAL FLOW/VELOCITY: ICD-10-PCS | Performed by: INTERNAL MEDICINE

## 2021-07-28 RX ADMIN — CHOLECALCIFEROL CAP 125 MCG (5000 UNIT) SCH UNIT: 125 CAP at 09:30

## 2021-07-28 RX ADMIN — FAMOTIDINE SCH MG: 20 TABLET ORAL at 20:28

## 2021-07-28 RX ADMIN — MORPHINE SULFATE PRN MG: 2 INJECTION, SOLUTION INTRAMUSCULAR; INTRAVENOUS at 18:22

## 2021-07-28 RX ADMIN — Medication SCH MG: at 09:30

## 2021-07-28 RX ADMIN — METHYLPREDNISOLONE SODIUM SUCCINATE SCH MG: 125 INJECTION, POWDER, FOR SOLUTION INTRAMUSCULAR; INTRAVENOUS at 15:01

## 2021-07-28 RX ADMIN — METHYLPREDNISOLONE SODIUM SUCCINATE SCH MG: 125 INJECTION, POWDER, FOR SOLUTION INTRAMUSCULAR; INTRAVENOUS at 06:24

## 2021-07-28 RX ADMIN — IPRATROPIUM BROMIDE AND ALBUTEROL SCH PUFF: 20; 100 SPRAY, METERED RESPIRATORY (INHALATION) at 08:00

## 2021-07-28 RX ADMIN — ENOXAPARIN SODIUM SCH MG: 40 INJECTION SUBCUTANEOUS at 19:26

## 2021-07-28 RX ADMIN — IPRATROPIUM BROMIDE AND ALBUTEROL SCH PUFF: 20; 100 SPRAY, METERED RESPIRATORY (INHALATION) at 19:27

## 2021-07-28 RX ADMIN — ACETAMINOPHEN PRN MG: 325 TABLET, FILM COATED ORAL at 18:39

## 2021-07-28 RX ADMIN — REMDESIVIR SCH MLS/HR: 100 INJECTION, POWDER, LYOPHILIZED, FOR SOLUTION INTRAVENOUS at 19:25

## 2021-07-28 RX ADMIN — METHYLPREDNISOLONE SODIUM SUCCINATE SCH MG: 125 INJECTION, POWDER, FOR SOLUTION INTRAMUSCULAR; INTRAVENOUS at 22:17

## 2021-07-28 RX ADMIN — ZINC SULFATE CAP 220 MG (50 MG ELEMENTAL ZN) SCH MG: 220 (50 ZN) CAP at 09:30

## 2021-07-28 RX ADMIN — CETIRIZINE HYDROCHLORIDE SCH MG: 10 TABLET, FILM COATED ORAL at 09:30

## 2021-07-28 RX ADMIN — IPRATROPIUM BROMIDE AND ALBUTEROL SCH PUFF: 20; 100 SPRAY, METERED RESPIRATORY (INHALATION) at 16:00

## 2021-07-28 RX ADMIN — ACETAMINOPHEN PRN MG: 325 TABLET, FILM COATED ORAL at 09:42

## 2021-07-28 RX ADMIN — IPRATROPIUM BROMIDE AND ALBUTEROL SCH PUFF: 20; 100 SPRAY, METERED RESPIRATORY (INHALATION) at 12:00

## 2021-07-28 RX ADMIN — ACETAMINOPHEN PRN MG: 325 TABLET, FILM COATED ORAL at 02:54

## 2021-07-28 NOTE — CONS
DATE OF CONSULTATION: 07/28/2021

ATTENDING PHYSICIAN:  Jimmy Devi DO



REASON FOR CONSULTATION:  The patient is seen in Pulmonary consultation at the 

request of Dr. Devi for abnormal x-ray, COVID-19 viral pneumonia.



HISTORY OF PRESENT ILLNESS:  The patient is a 56-year-old healthy individual who

presented to [facility] 2 days ago with complaints of fever, chills and body 

aches, tested positive for COVID-19.  Initially, he required 2 liters.  He is 

currently up to 15 liters nasal cannula.  The patient is awake, alert, following

commands.



He is unvaccinated.  He is a retired [employer] worker.



He states that he had COVID last 03/2020.  He had symptoms at that time, lost 

his taste.  He never has been vaccinated post-COVID a year ago.



He thinks that currently he contracted COVID from his son who is also 

unvaccinated.  He had chest x-ray which revealed bilateral pulmonary 

infiltrates.



PAST MEDICAL HISTORY:  DJD.



PAST SURGICAL HISTORY:  Appendectomy.



SOCIAL HISTORY:  He is a nonsmoker.  Retired from the [employer].  

Lives with his wife and children.



CURRENT MEDICATIONS:  List was reviewed.  He has received Remdesivir and 

steroids.



ALLERGIES:  No known drug allergies.



FAMILY HISTORY:  As indicated above, his son with COVID.



REVIEW OF SYSTEMS:  As indicated in the history of present illness, otherwise 

other systems were reviewed and negative.



PHYSICAL EXAMINATION:

GENERAL:  The patient was awake, alert, in no significant respiratory distress. 

He was evaluated during the COVID-19 pandemic on visual inspection.  No 

significant respiratory distress.  No paroxysmal breathing pattern.



LABORATORY DATA:  MAR was reviewed.  Chest x-ray as indicated above.  There were

bilateral pulmonary infiltrates.



I reviewed his records from [facility].  Initial O2 sat on room air was 88%.  

Sodium was 138, potassium 3.4, BUN was 20, creatinine was 1.1.  Lactic acid 

level was 1.6.  AST and ALT were noted at 61 and 49.  WBC was 7.4, hemoglobin 

and hematocrit 16 and 49.



IMPRESSION:

1.  Acute hypoxemic respiratory failure secondary to COVID-19 viral pneumonia.

2.  COVID-19 viral pneumonia.

3.  Abnormal chest x-ray, compatible with viral pneumonia.



PLAN:

1.  We will continue support with oxygen supplementation.  If required, we will 

increase to either nonrebreather or Vapotherm.

2.  Continue Remdesivir and steroids.

3.  Follow labs.



The above was discussed with the wife.

I do appreciate the privilege in sharing in the patient's care.







LELE/DESHAWN

DR: Lambert   DD: 07/28/2021 15:04

DT: 07/28/2021 16:11   TID: [id number]

## 2021-07-28 NOTE — CONS
DATE OF CONSULTATION: 07/28/2021

REFERRING PHYSICIAN:  Dr. Devi.



REASON FOR CONSULTATION:  COVID-19 positive.



HISTORY OF PRESENT ILLNESS:  A 56-year-old male, otherwise healthy, presented to

[facility] on 07/26/2021 with complaints of fever, headache, cough, 

congestion, sore throat, shortness of breath, nonproductive cough which kept on 

worsening.  The patient was exposed to his son with COVID who did well in 3 days
without 

any intervention.  The patient was hypoxic at ED, initially requiring 2 liters 
of O2 

by nasal cannula.  Chest x-ray showed mild basilar infiltrates.  COVID test was 

positive.  The patient was transferred to [facility] for 

pulmonary evaluation and treatment.  The patient was started on remdesivir, 

steroids.  Currently, he is requiring 15 liters high flow O2 by nasal cannula, 

remains afebrile.  T-max was 99.3.  He had a little clear sputum after he took a

bath this morning.  Otherwise, basically feels the same.



PAST MEDICAL HISTORY:  DJD.



PAST SURGICAL HISTORY:  Appendectomy.



SOCIAL HISTORY:  Nonsmoker, nondrinker.  Retired, worked for the [employer]

system.  Lives with his children. He has not been Vaccinated for COVID 19



CURRENT MEDICATIONS:  Methylprednisolone, remdesivir.  Other medications 

reviewed in medication list.



ALLERGIES:  No known drug allergies.



FAMILY HISTORY:  As per HPI.



REVIEW OF SYSTEMS:  Loss of taste, low appetite; otherwise as per above in HPI.



PHYSICAL EXAMINATION:

VITAL SIGNS:  Temperature 96.5, pulse 75, respiratory rate 18, blood pressure 

130/84, oxygen saturation 92% on high flow nasal cannula 15 liters.

HEENT:  Normocephalic, atraumatic.  Anicteric.  No thrush.

NECK:  Supple, no JVD.

LUNGS:  Crackles bilaterally.  No accessory muscle use.

HEART:  S1, S2.  No murmurs.

ABDOMEN:  Soft, nontender, nondistended.  Bowel sounds present.

EXTREMITIES:  No edema, no cyanosis.

DERMATOLOGIC:  Warm, dry, no generalized rash.

NEUROLOGIC:  Alert, oriented, grossly nonfocal.

PSYCHIATRIC:  Calm and cooperative.  PIV looks clean.



LABORATORY DATA:  Labs none here.  COVID positive at [facility].  WBC 

7.4, hemoglobin 16.3, platelets 169.  Sodium 138, potassium 3.4, chloride 101, 

bicarbonate 25, BUN 20, creatinine 1.1, glucose 135.  Lactate 1.6.  LFTs:  AST 

61, ALT 49, alkaline phosphatase 60, total protein 7.2.  SARS COVID positive.  

Coronavirus positive.



DIAGNOSTICS:  Chest x-ray at [place], mild ill-defined and bibasilar opacities.



IMPRESSION:

1.  COVID-19 pneumonia.

2.  Acute hypoxic respiratory failure.

3.  Degenerative joint disease.

4.  Lymphopenia.

5.  Hypokalemia.

6.  Abnormal LFTs.



RECOMMENDATIONS:

1.  Continue remdesivir and steroids per primary.

2.  Continue supportive care.

3.  Follow up labs and cultures.

4.  Maintain aspiration precaution.

5.  Pulmonary consulted.

6.  Medical records from [facility], reviewed.

7.  Discussed with nursing staff.



Thank you for allowing me to participate in this patient's care.  If you have 

any questions, do not hesitate to contact me.







AJIME/EDUAR

DR: Dinorah   DD: 07/28/2021 09:18

DT: 07/28/2021 11:18   TID: [id number]

[facility]

## 2021-07-28 NOTE — PDOC
PULMONARY PROGRESS NOTES


DATE: 7/28/21 


TIME: 15:09


Vitals





Vital Signs








  Date Time  Temp Pulse Resp B/P (MAP) Pulse Ox O2 Delivery O2 Flow Rate FiO2


 


7/28/21 11:33 97.9 85 18 143/85 (104) 89 High Flow Nasal Cannula 15.0 





 97.9       








Medications





Active Scripts








 Medications  Dose


 Route/Sig


 Max Daily Dose Days Date Category Dose


Instructions


 


 Morphine Sulfate


 2 Mg/1 Ml


 Cartridge  2 Mg


 IV PRN Q4HRS PRN


   7/27/21 Reported 


 


 Zinc Sulfate 50


 Mg Tablet  220 Mg


 PO DAILY


   7/27/21 Reported 


 


 Methylprednisolone


 Sod Succ 125 Mg


 Vial  60 Mg


 IJ Q8HRS


   7/27/21 Reported 


 


 Combivent


 Respimat Inhal


  (Ipratropium/Albuterol


 Sulfate) 4 Gm


 Aer.w.adap  2 Inh


 IH QID


   7/27/21 Reported 


 


 Pepcid


  (Famotidine) 20


 Mg Tablet  20 Mg


 PO HS


   7/27/21 Reported 


 


 Lovenox


  (Enoxaparin


 Sodium) 30 Mg/0.3


 Ml Disp.syrin  30 Mg


 SQ DAILY


   7/27/21 Reported 


 


 Vitamin D3 **


  (Vitamin D) 125


 Mcg Capsule  125 Mcg


 PO DAILY


   7/27/21 Reported  5,000 UNITS = 125 MCG


 


 Zyrtec


  (Cetirizine Hcl)


 10 Mg Tablet  1 Tab


 PO DAILY


   7/27/21 Reported 


 


 Ascorbic Acid 500


 Mg Tablet  2 Mg


 PO DAILY


   7/27/21 Reported 


 


 Acetaminophen 325


 Mg Tablet  2 Tab


 PO PRN Q6HRS PRN


  30 7/27/21 Reported 


 


 No Known


 Medications Prior


 To Admisstion


  (Info)  Each  1 Each


 


   8/25/20 Reported 











Impression


.


Full consult dictated


Spoke with wife


Continue current care group for COVID-19 viral pneumonia











JUDY COLEMAN MD              Jul 28, 2021 15:10

## 2021-07-28 NOTE — NUR
SS following for discharge planning. SS reviewed pt chart and discussed with pt RN. Pt is 
from home with spouse and is currently requiring oxygen at ten to fifteen liters nasal 
canula. COVID19 positive. Pt on IV Remdesivir and IV Solu Medrol. ID and Pulmonology 
consulted. SS will continue to follow for discharge planning.

## 2021-07-28 NOTE — NUR
Assessment Completed vss poc explained pt denied pain call light in reach will resume care 
and continue to monitor pt.

## 2021-07-28 NOTE — PDOC1
History and Physical


Date of Service:


DOS:


DATE: 7/28/21 


TIME: 14:57





Chief Complaint:


Chief Complain:


Shortness of breath





History of Present Illness:


HPI:


Patient is a 56-year-old male with no significant past medical history except 

for degenerative joint disease who was transferred from [facility] 

yesterday with complaints of fever, headache, dry cough, shortness of breath for

the past 3 days.  Patient stated that he was actually exposed from his son he 

was worsening and required to come to the ED at [facility] and he was found to 

be hypoxic and required 2 L of nasal cannula oxygen.  Chest x-ray over there 

showed bilateral mild bibasilar infiltrates.  Covid test was positive and 

patient was transferred to [facility] for pulmonary evaluation and treatment.  Patient 

was started on Remdesivir and IV steroids.  Currently patient is requiring 15 L 

high flow nasal cannula and IV steroids.





Past Medical/Surgical History:


PMH/PSH:


Past medical history: Degenerative joint disease


Surgical history: Appendectomy





Allergies:


Allergies:  


Coded Allergies:  


     No Known Drug Allergies (Unverified , 7/17/19)





Family History:


Family History:


Reviewed with no relevant findings





Social History:


Social History:


SOCIAL HISTORY:  Nonsmoker, nondrinker.  Retired, working for the [employer]


system.  Lives with his children.





Current Medications:


Current Medications





Current Medications


Acetaminophen (Tylenol) 650 mg PRN Q6HRS  PRN PO MILD PAIN / TEMP > 100.3'F Last

administered on 7/27/21at 20:09;  Start 7/27/21 at 19:00;  Stop 7/27/21 at 

20:15;  Status DC


Enoxaparin Sodium (Lovenox Per Pharmacy Prophylaxis Dosing) 1 each PRN DAILY  

PRN MC SEE COMMENTS;  Start 7/27/21 at 19:45


Enoxaparin Sodium (Lovenox 40mg Syringe) 40 mg Q24H SQ  Last administered on 

7/27/21at 21:22;  Start 7/27/21 at 20:00


Remdesivir 200 mg/ Sodium Chloride 210 ml @  210 mls/hr 1X  ONCE IV  Last 

administered on 7/27/21at 20:08;  Start 7/27/21 at 20:00;  Stop 7/27/21 at 

20:59;  Status DC


Remdesivir 100 mg/ Sodium Chloride 230 ml @  460 mls/hr Q24H IV ;  Start 7/28/21

at 20:00;  Stop 7/31/21 at 20:29


Acetaminophen (Tylenol) 650 mg PRN Q6HRS  PRN PO pain or fever Last administered

on 7/28/21at 09:42;  Start 7/27/21 at 19:45


Ascorbic Acid (Vitamin C) 1,000 mg DAILY PO  Last administered on 7/28/21at 

09:30;  Start 7/28/21 at 09:00


Cetirizine HCl (ZyrTEC) 10 mg DAILY PO  Last administered on 7/28/21at 09:30;  

Start 7/28/21 at 09:00


Vitamin D (Vitamin D3) 5,000 unit DAILY PO  Last administered on 7/28/21at 

09:30;  Start 7/28/21 at 09:00


Enoxaparin Sodium (Lovenox 30mg Syringe) 30 mg Q24H SQ ;  Start 7/27/21 at 

21:00;  Status Cancel


Famotidine (Pepcid) 20 mg HS PO  Last administered on 7/27/21at 21:22;  Start 

7/27/21 at 21:00


Non-Formulary Medication (Ipratropium/ Albuterol Sulfate (Combivent Respimat 

Inhal)) 2 inh QID IH ;  Start 7/27/21 at 21:00;  Status UNV


Methylprednisolone Sodium Succinate (SOLU-Medrol 125MG VIAL) 60 mg Q8HRS IV  

Last administered on 7/28/21at 06:24;  Start 7/27/21 at 22:00


Morphine Sulfate (Morphine Sulfate) 2 mg PRN Q2HR  PRN IVP MODERATE TO SEVERE 

PAIN;  Start 7/27/21 at 20:30


Zinc Sulfate (Orazinc) 220 mg DAILY PO  Last administered on 7/28/21at 09:30;  

Start 7/28/21 at 09:00


Albuterol/ Ipratropium (Duoneb) 3 ml RTQID NEB ;  Start 7/27/21 at 21:00;  

Status Cancel


Albuterol/ Ipratropium (Combivent Respimat  Mcg) 2 puff RTQID INH  Last 

administered on 7/28/21at 08:00;  Start 7/27/21 at 21:00





Active Scripts


Active


Reported


Morphine Sulfate 2 Mg/1 Ml Cartridge 2 Mg IV PRN Q4HRS PRN


Zinc Sulfate 50 Mg Tablet 220 Mg PO DAILY


Methylprednisolone Sod Succ 125 Mg Vial 60 Mg IJ Q8HRS


Combivent Respimat Inhal (Ipratropium/Albuterol Sulfate) 4 Gm Aer.w.adap 2 Inh 

IH QID


Pepcid (Famotidine) 20 Mg Tablet 20 Mg PO HS


Lovenox (Enoxaparin Sodium) 30 Mg/0.3 Ml Disp.syrin 30 Mg SQ DAILY


Vitamin D3 ** (Vitamin D) 125 Mcg Capsule 125 Mcg PO DAILY


     5,000 UNITS = 125 MCG


Zyrtec (Cetirizine Hcl) 10 Mg Tablet 1 Tab PO DAILY


Ascorbic Acid 500 Mg Tablet 2 Mg PO DAILY


Acetaminophen 325 Mg Tablet 2 Tab PO PRN Q6HRS PRN 30 Days


No Known Medications Prior To Admisstion (Info)  Each 1 Each 





ROS:


Review of Systems


Review of System


REVIEW OF SYSTEMS:


GENERAL:  Denies weakness


SKIN:  No bruising, hair changes or rashes.


EYES:  No blurred, double or loss of vision.


NOSE AND THROAT:  No history of nosebleeds, hoarseness or sore throat.


HEART:  No history of palpitations, chest pain or shortness of breath on


exertion.


LUNGS:  Denies cough, hemoptysis, wheezing or shortness of breath.


GASTROINTESTINAL:  Denies changes in appetite, nausea, vomiting, diarrhea or


constipation.


GENITOURINARY:  No history of frequency, urgency, hesitancy or nocturia.


NEUROLOGIC:  Denies history of numbness, tingling, or tremor.


PSYCHIATRIC:  No history of panic, anxiety or depression.


ENDOCRINE:  No history of heat or cold intolerance, polyuria or polydipsia.


EXTREMITIES:  Denies joint pain, pain on walking or stiffness.





Physical Exam:


Vital Signs:





Vital Signs








  Date Time  Temp Pulse Resp B/P (MAP) Pulse Ox O2 Delivery O2 Flow Rate FiO2


 


7/28/21 11:33 97.9 85 18 143/85 (104) 89 High Flow Nasal Cannula 15.0 





 97.9       








Physcial Exam:


General: Well developed, well nourished, no acute distress, well appearing.  

Resting comfortably in bed


HEENT:  Pupils equally round and reactive to light, EOMI, no discharge, normal 

conjunctiva


Neck:  Supple, no nuchal rigidity, no JVD, trachea midline, no tenderness


Cardiac:  RRR, no murmurs, no gallops, no rubs


Chest/Lungs:  CTAB, no wheeze, no rhonchi, no crackles.  Diminished bilateral 

breath sounds


Abdomen: soft, non-distended, no guarding, no peritoneal signs, non-tender 


Back:  No tenderness


Extremities:  no edema, pulses intact, non-tender,capillary refill <3 sec 

bilateral upper and lower extremities,


Neuro:  Alert and oriented x 4, no focal deficits, normal speech





Labs:


Labs:


No recent images or labs to review





Images:


Images


Chest x-ray from [facility] showed bilateral basilar infiltrates





Assessment/Plan


Assessment/Plan


Acute hypoxic respiratory failure


COVID-19 pneumonia





Admit to medicine for further management


Pulmonology consult


ID consult


Continue IV Remdesivir


Continue IV thiamine and vitamin C


Continue IV steroids


Pending ferritin, LDH, CRP, D-dimer labs


Titrate O2 supplementation to maintain O2 saturation greater than 92%


Lovenox for DVT prophylaxis


Protonix while on steroids GI prophylaxis


ADA diet


Full code


Discussed with RN and SW


Disposition patient management as above


Surrogate decision maker is the wife





Justifications for Admission


Other Justification














KATHY AYON MD                  Jul 28, 2021 15:04

## 2021-07-29 VITALS — SYSTOLIC BLOOD PRESSURE: 138 MMHG | DIASTOLIC BLOOD PRESSURE: 79 MMHG

## 2021-07-29 VITALS — DIASTOLIC BLOOD PRESSURE: 79 MMHG | SYSTOLIC BLOOD PRESSURE: 138 MMHG

## 2021-07-29 VITALS — SYSTOLIC BLOOD PRESSURE: 134 MMHG | DIASTOLIC BLOOD PRESSURE: 81 MMHG

## 2021-07-29 VITALS — SYSTOLIC BLOOD PRESSURE: 146 MMHG | DIASTOLIC BLOOD PRESSURE: 82 MMHG

## 2021-07-29 VITALS — DIASTOLIC BLOOD PRESSURE: 80 MMHG | SYSTOLIC BLOOD PRESSURE: 134 MMHG

## 2021-07-29 VITALS — DIASTOLIC BLOOD PRESSURE: 56 MMHG | SYSTOLIC BLOOD PRESSURE: 99 MMHG

## 2021-07-29 LAB
BASE EXCESS ABG: 6 MMOL/L (ref -3–3)
HCO3 BLDA-SCNC: 31 MMOL/L (ref 21–28)
INSPIRATION SETTING TIME VENT: 100
PCO2 BLDA: 46 MMHG (ref 35–46)
PO2 BLDA: 85 MMHG (ref 75–108)
SAO2 % BLDA: 96 % (ref 92–99)

## 2021-07-29 PROCEDURE — XW033E5 INTRODUCTION OF REMDESIVIR ANTI-INFECTIVE INTO PERIPHERAL VEIN, PERCUTANEOUS APPROACH, NEW TECHNOLOGY GROUP 5: ICD-10-PCS | Performed by: INTERNAL MEDICINE

## 2021-07-29 PROCEDURE — 5A0935A ASSISTANCE WITH RESPIRATORY VENTILATION, LESS THAN 24 CONSECUTIVE HOURS, HIGH NASAL FLOW/VELOCITY: ICD-10-PCS | Performed by: INTERNAL MEDICINE

## 2021-07-29 PROCEDURE — 5A09557 ASSISTANCE WITH RESPIRATORY VENTILATION, GREATER THAN 96 CONSECUTIVE HOURS, CONTINUOUS POSITIVE AIRWAY PRESSURE: ICD-10-PCS | Performed by: INTERNAL MEDICINE

## 2021-07-29 RX ADMIN — ZINC SULFATE CAP 220 MG (50 MG ELEMENTAL ZN) SCH MG: 220 (50 ZN) CAP at 09:01

## 2021-07-29 RX ADMIN — FAMOTIDINE SCH MG: 20 TABLET ORAL at 20:51

## 2021-07-29 RX ADMIN — METHYLPREDNISOLONE SODIUM SUCCINATE SCH MG: 125 INJECTION, POWDER, FOR SOLUTION INTRAMUSCULAR; INTRAVENOUS at 05:43

## 2021-07-29 RX ADMIN — REMDESIVIR SCH MLS/HR: 100 INJECTION, POWDER, LYOPHILIZED, FOR SOLUTION INTRAVENOUS at 19:55

## 2021-07-29 RX ADMIN — IPRATROPIUM BROMIDE AND ALBUTEROL SCH PUFF: 20; 100 SPRAY, METERED RESPIRATORY (INHALATION) at 08:00

## 2021-07-29 RX ADMIN — ENOXAPARIN SODIUM SCH MG: 40 INJECTION SUBCUTANEOUS at 19:55

## 2021-07-29 RX ADMIN — CHOLECALCIFEROL CAP 125 MCG (5000 UNIT) SCH UNIT: 125 CAP at 09:01

## 2021-07-29 RX ADMIN — CETIRIZINE HYDROCHLORIDE SCH MG: 10 TABLET, FILM COATED ORAL at 09:01

## 2021-07-29 RX ADMIN — Medication SCH MG: at 09:01

## 2021-07-29 RX ADMIN — IPRATROPIUM BROMIDE AND ALBUTEROL SCH PUFF: 20; 100 SPRAY, METERED RESPIRATORY (INHALATION) at 16:00

## 2021-07-29 RX ADMIN — METHYLPREDNISOLONE SODIUM SUCCINATE SCH MG: 125 INJECTION, POWDER, FOR SOLUTION INTRAMUSCULAR; INTRAVENOUS at 13:31

## 2021-07-29 RX ADMIN — IPRATROPIUM BROMIDE AND ALBUTEROL SCH PUFF: 20; 100 SPRAY, METERED RESPIRATORY (INHALATION) at 12:00

## 2021-07-29 RX ADMIN — IPRATROPIUM BROMIDE AND ALBUTEROL SCH PUFF: 20; 100 SPRAY, METERED RESPIRATORY (INHALATION) at 20:51

## 2021-07-29 RX ADMIN — METHYLPREDNISOLONE SODIUM SUCCINATE SCH MG: 125 INJECTION, POWDER, FOR SOLUTION INTRAMUSCULAR; INTRAVENOUS at 21:55

## 2021-07-29 NOTE — PDOC
Infectious Disease Note


Subjective:


Subjective


Patient remains on nonrebreather


Feels the same


Denies any nausea, vomiting, diarrhea, abdominal pain, chest pain





Vital Signs:


Vital Signs





Vital Signs








  Date Time  Temp Pulse Resp B/P (MAP) Pulse Ox O2 Delivery O2 Flow Rate FiO2


 


7/29/21 02:51 97.5 68 18 134/80 (98) 92 High Flow Nasal Cannula 15.0 





 97.5       











Physical Exam:


PHYSICAL EXAM


GEN axo3 male in nad


HEENT:  Normocephalic, atraumatic.  Anicteric.  No thrush.


NECK:  Supple, no JVD.


LUNGS:  Crackles bilaterally.  No accessory muscle use.


HEART:  S1, S2.  No murmurs.


ABDOMEN:  Soft, nontender, nondistended.  Bowel sounds present.


EXTREMITIES:  No edema, no cyanosis.


DERMATOLOGIC:  Warm, dry, no generalized rash.


NEUROLOGIC:  Alert, oriented, grossly nonfocal.


PSYCHIATRIC:  Calm and cooperative.  PIV looks clean.





Medications:


Inpatient Meds:


Medications reviewed.





Objective:


Assessment:


 


1.  COVID-19 pneumonia.


2.  Acute hypoxic respiratory failure.


3.  Degenerative joint disease.


4.  Lymphopenia.


5.  Hypokalemia.


6.  Abnormal LFTs.





Plan:


Plan of Care


 


1.  Continue remdesivir and steroids per primary.


2.  Continue supportive care.


3.  Follow up labs and cultures.


4.  Maintain aspiration precaution.


5.  Pulmonary consulted.


6.  Medical records from [facility], reviewed.


 .  Discussed with nursing staff.











SHERLEY HASSAN MD           Jul 29, 2021 07:36

## 2021-07-29 NOTE — PDOC
[employer] PROGRESS NOTE


Date of Service


DOS:


DATE: 7/29/21 


TIME: 13:41





Chief Complaint


Chief Complaint


Acute hypoxic respiratory failure


COVID-19 pneumonia





Admit to medicine for further management


Pulmonology consult


ID consult


Continue IV Remdesivir


Continue IV thiamine and vitamin C


Continue IV steroids


Pending ferritin, LDH, CRP, D-dimer labs


Titrate O2 supplementation to maintain O2 saturation greater than 92%


Lovenox for DVT prophylaxis


Protonix while on steroids GI prophylaxis


ADA diet


Full code


Discussed with RN and SW


Disposition patient management as above


Surrogate decision maker is the wife





History of Present Illness


History of Present Illness


56-year-old male with no significant past medical history except for 

degenerative joint disease who was transferred from [facility] 

yesterday with complaints of fever, headache, dry cough, shortness of breath for

the past 3 days.  Patient stated that he was actually exposed from his son he 

was worsening and required to come to the ED at [facility] and he was found to 

be hypoxic and required 2 L of nasal cannula oxygen.  Chest x-ray over there 

showed bilateral mild bibasilar infiltrates.  Covid test was positive and 

patient was transferred to [facility] for pulmonary evaluation and treatment.  Patient 

was started on Remdesivir and IV steroids.  Currently patient is requiring 15 L 

high flow nasal cannula and IV steroids.





7/29/2021


No acute events overnight.  Patient was saturating 92% on 15 L nasal cannula and

he had desatted down to 70% after he had to blow his nose.  Patient was placed 

on BiPAP at 100% FiO2.  Chest leg appears to be worsening.  Patient's chart, 

labs, images were reviewed and discussed with RN





Vitals/I&O


Vitals/I&O:





                                   Vital Signs








  Date Time  Temp Pulse Resp B/P (MAP) Pulse Ox O2 Delivery O2 Flow Rate FiO2


 


7/29/21 11:48     94 BiPAP/CPAP  


 


7/29/21 11:00 98.3 61 18 138/79 (98)    





 98.3       


 


7/29/21 07:00       15.0 














                                    I & O   


 


 7/28/21 7/28/21 7/29/21





 15:00 23:00 07:00


 


Intake Total  120 ml 200 ml


 


Output Total   700 ml


 


Balance  120 ml -500 ml











Physical Exam


Physical Exam:


GEN axo3 male in nad


HEENT:  Normocephalic, atraumatic.  Anicteric.  No thrush.


NECK:  Supple, no JVD.


LUNGS:  Crackles bilaterally.  No accessory muscle use.


HEART:  S1, S2.  No murmurs.


ABDOMEN:  Soft, nontender, nondistended.  Bowel sounds present.


EXTREMITIES:  No edema, no cyanosis.


DERMATOLOGIC:  Warm, dry, no generalized rash.


NEUROLOGIC:  Alert, oriented, grossly nonfocal.


PSYCHIATRIC:  Calm and cooperative.  PIV looks clean.





Labs


Labs:





Laboratory Tests








Test


 7/29/21


11:56


 


O2 Saturation 96 % (92-99) 


 


Arterial Blood pH


 7.44


(7.35-7.45)


 


Arterial Blood pCO2 at


Patient Temp 46 mmHg


(35-46)


 


Arterial Blood pO2 at Patient


Temp 85 mmHg


()


 


Arterial Blood HCO3


 31 mmol/L


(21-28)


 


Arterial Blood Base Excess


 6 mmol/L


(-3-3)


 


FiO2 100 











Comment


Review of Relevant


I have reviewed the following items genesis (where applicable) has been applied.


Medications:





Current Medications








 Medications


  (Trade)  Dose


 Ordered  Sig/Shayy


 Route


 PRN Reason  Start Time


 Stop Time Status Last Admin


Dose Admin


 


 Remdesivir 100 mg/


 Sodium Chloride  230 ml @ 


 460 mls/hr  Q24H


 IV


   7/28/21 20:00


 7/31/21 20:29  7/28/21 19:25





 


 Alprazolam


  (Xanax)  0.25 mg  PRN Q8HRS  PRN


 PO


 ANXIETY / AGITATION  7/28/21 15:15


    7/29/21 08:59














Justifications for Admission


Other Justification














KATHY AYON MD                  Jul 29, 2021 13:43

## 2021-07-29 NOTE — PDOC
PULMONARY PROGRESS NOTES


DATE: 7/29/21 


TIME: 08:38


Subjective


Pt. remains on 15 liters NC 


anxious on exam


Vitals





Vital Signs








  Date Time  Temp Pulse Resp B/P (MAP) Pulse Ox O2 Delivery O2 Flow Rate FiO2


 


7/29/21 02:51 97.5 68 18 134/80 (98) 92 High Flow Nasal Cannula 15.0 





 97.5       








Comments


Pt. seen during covid -19 pandemic visual exam preformed 


RRR


on NC o2


no distress 


no obvious rash or edema


Medications





Active Scripts








 Medications  Dose


 Route/Sig


 Max Daily Dose Days Date Category Dose


Instructions


 


 Morphine Sulfate


 2 Mg/1 Ml


 Cartridge  2 Mg


 IV PRN Q4HRS PRN


   7/27/21 Reported 


 


 Zinc Sulfate 50


 Mg Tablet  220 Mg


 PO DAILY


   7/27/21 Reported 


 


 Methylprednisolone


 Sod Succ 125 Mg


 Vial  60 Mg


 IJ Q8HRS


   7/27/21 Reported 


 


 Combivent


 Respimat Inhal


  (Ipratropium/Albuterol


 Sulfate) 4 Gm


 Aer.w.adap  2 Inh


 IH QID


   7/27/21 Reported 


 


 Pepcid


  (Famotidine) 20


 Mg Tablet  20 Mg


 PO HS


   7/27/21 Reported 


 


 Lovenox


  (Enoxaparin


 Sodium) 30 Mg/0.3


 Ml Disp.syrin  30 Mg


 SQ DAILY


   7/27/21 Reported 


 


 Vitamin D3 **


  (Vitamin D) 125


 Mcg Capsule  125 Mcg


 PO DAILY


   7/27/21 Reported  5,000 UNITS = 125 MCG


 


 Zyrtec


  (Cetirizine Hcl)


 10 Mg Tablet  1 Tab


 PO DAILY


   7/27/21 Reported 


 


 Ascorbic Acid 500


 Mg Tablet  2 Mg


 PO DAILY


   7/27/21 Reported 


 


 Acetaminophen 325


 Mg Tablet  2 Tab


 PO PRN Q6HRS PRN


  30 7/27/21 Reported 


 


 No Known


 Medications Prior


 To Admisstion


  (Info)  Each  1 Each


 


   8/25/20 Reported 











Impression


.


IMPRESSION:


1.  Acute hypoxemic respiratory failure secondary to COVID-19 viral pneumonia.


2.  COVID-19 viral pneumonia.


3.  Abnormal chest x-ray, compatible with viral pneumonia.





Plan


.


Updated 7/29/21


Continue supplemental oxygen to keep sats above 92%, on 15 liters NC, may 

require Vapotherm  


BIPAP PRN


Continue Remdesivir


Continue steroids.


PRN tx of Anxiety 


D/W RN and RT 











PLAN:


1.  We will continue support with oxygen supplementation.  If required, we will 


increase to either nonrebreather or Vapotherm.


2.  Continue Remdesivir and steroids.


3.  Follow labs.





The above was discussed with the wife.


I do appreciate the privilege in sharing in the patient's care.











JUDY COLEMAN MD              Jul 29, 2021 08:38

## 2021-07-29 NOTE — NUR
SS following up with discharge planning. SS reviewed pt chart and discussed with pt RN. Pt 
is currently requiring BIPAP at 90% and 15 liters nasal canula PRN. COVID19 positive. Pt on 
IV Remdesivir and IV Solu Medrol. SS will continue to follow for discharge planning.

## 2021-07-29 NOTE — RAD
XR CHEST 1V 7/29/2021 11:31 AM



Reason: hypoxia



Comparison: Chest radiograph July 26, 2021



Technique: AP portable upright radiograph the chest



Findings:

Increased patchy airspace opacities and interstitial opacity bilaterally. No significant pleural effu
alexis. The cardiomediastinal silhouette is stable. No pneumothorax.



Impression:



Worsening interstitial and airspace disease.



Electronically signed by: Sumeet Marrufo (7/29/2021 11:41 AM) [id number]

## 2021-07-29 NOTE — NUR
Pt not on cardiac monitor, upon entering patient room, patient noted to have taken bipap, 
oxygen probe, and cardiac monitor off and was in the bathroom on RA. Pt noted to be short of 
breath and lips, nose, and ear lobes were blue. Pt stated he woke up and thought he was in 
[place]. He stated that he looked out the window and wasn't sure where he was; "I saw palm 
trees and boats". Pt was given IV Ativan per family request. Pt attempted to argue with 
staff to use the shower to get cleaned up. It was explained to the patient that if he did 
not get placed back on the bipap that he would mostly likely stop breathing. Pt finally 
agreed, allowed staff to place him  back on 15L NC and 15L non rebreather. Pt taken back to 
bed and placed back on the bipap. Oxygen saturation started at 70% and patient slowly 
recovered to 93%. Pt continued to state he was confused as to what happened and what is 
going on. Pt re-educated on the disease process and his increasing need for oxygen. Pt back 
in bed on bipap resting comfortably with call light in reach.

## 2021-07-29 NOTE — NUR
pt in bed assessment completed vss poc explained vss pt denied pain, pt on Vapotherm@40L 15L 
non rebreather pt o2 sats 92% will resume care and continue to monitor pt. Call light in 
reach.

## 2021-07-29 NOTE — NUR
Pt wife and son continually calling regarding patient updates and "numbers". Family are 
stating concerns given from other family members who are in the healthcare field and work 
with COVID patients. Family members are requesting certain treatments and medications. 
Physician is aware and has spoken with family. Family would like to request the patient be 
placed on vapotherm and given ativan for "air hunger". Family educated regarding disease 
process and equipment and treatments used. Family verbalized understanding, however, 
continued to ask for "treatments and test" that other family members would like. Wife states 
that patient's sister works at the [employer] and is suggesting that staff obtain a "CT to get a 
baseline so you don't miss something". Sister would also like the patient to be placed on 
vapotherm and would like to know why the patient wasn't placed on bipap and given ativan 
upon admit. Again, wife and son educated on disease process and treatment plans. Son and 
wife inquiring about how long patient will be ill and if he will be okay. Son is concerned 
stating that the current treatment is not working and is requesting staff to check the 
patients lung capacity and to start different antibiotics. Families concerns and requests 
were given to pulmonology and physician to call family.

## 2021-07-29 NOTE — NUR
pt Oxygen saturation in low 80's to high 70's. Pt currently on 15 L NC. Pt placed on 15L non 
rebreather as well, oxygen saturation was at 84%. Physician notified and order given for 
bipap. Respiratory placed patient on bipap and oxygen saturation at 97%. ABG order given. Pt 
stable and resting comfortably at this time. Call light in reach.

## 2021-07-29 NOTE — NUR
Pt oxygen saturation showing 70% on the monitor. Upon entering the room, pt had taken bipap 
off. Pt states "I just can't do this anymore"; "I thought this was temporary and I can't 
take it anymore". Pt was educated that he needed quite a bit of oxygen and the 15 L NC and 
15 L non rebreather were no longer allowing his body to get adequate oxygen. Pt agreed to go 
back on the bipap if nursing staff notified respiratory. Respiratory notified and vapotherm 
brought to patient room. Pt now on 40L vapotherm in addition to 15L nonbreather. Pt oxygen 
saturation is noted to be ranging from 92%-96%. Bipap is on standby and patient educated on 
the possibility of needing to use the bipap again. Pt verbalized understanding. Pt resting 
in bed with call light in reach.

## 2021-07-30 VITALS — SYSTOLIC BLOOD PRESSURE: 142 MMHG | DIASTOLIC BLOOD PRESSURE: 83 MMHG

## 2021-07-30 VITALS — DIASTOLIC BLOOD PRESSURE: 75 MMHG | SYSTOLIC BLOOD PRESSURE: 129 MMHG

## 2021-07-30 VITALS — SYSTOLIC BLOOD PRESSURE: 147 MMHG | DIASTOLIC BLOOD PRESSURE: 88 MMHG

## 2021-07-30 VITALS — SYSTOLIC BLOOD PRESSURE: 150 MMHG | DIASTOLIC BLOOD PRESSURE: 94 MMHG

## 2021-07-30 VITALS — SYSTOLIC BLOOD PRESSURE: 139 MMHG | DIASTOLIC BLOOD PRESSURE: 84 MMHG

## 2021-07-30 VITALS — SYSTOLIC BLOOD PRESSURE: 162 MMHG | DIASTOLIC BLOOD PRESSURE: 93 MMHG

## 2021-07-30 LAB
ALBUMIN SERPL-MCNC: 2.3 G/DL (ref 3.4–5)
ALBUMIN/GLOB SERPL: 0.6 {RATIO} (ref 1–1.7)
ALP SERPL-CCNC: 50 U/L (ref 46–116)
ALT SERPL-CCNC: 196 U/L (ref 16–63)
ANION GAP SERPL CALC-SCNC: 3 MMOL/L (ref 6–14)
AST SERPL-CCNC: 141 U/L (ref 15–37)
BILIRUB SERPL-MCNC: 1.1 MG/DL (ref 0.2–1)
BUN SERPL-MCNC: 30 MG/DL (ref 8–26)
BUN/CREAT SERPL: 38 (ref 6–20)
CALCIUM SERPL-MCNC: 8.7 MG/DL (ref 8.5–10.1)
CHLORIDE SERPL-SCNC: 103 MMOL/L (ref 98–107)
CO2 SERPL-SCNC: 33 MMOL/L (ref 21–32)
CREAT SERPL-MCNC: 0.8 MG/DL (ref 0.7–1.3)
ERYTHROCYTE [DISTWIDTH] IN BLOOD BY AUTOMATED COUNT: 12.8 % (ref 11.5–14.5)
GFR SERPLBLD BASED ON 1.73 SQ M-ARVRAT: 100 ML/MIN
GLUCOSE SERPL-MCNC: 140 MG/DL (ref 70–99)
HCT VFR BLD CALC: 45.1 % (ref 39–53)
HGB BLD-MCNC: 15.2 G/DL (ref 13–17.5)
MCH RBC QN AUTO: 32 PG (ref 25–35)
MCHC RBC AUTO-ENTMCNC: 34 G/DL (ref 31–37)
MCV RBC AUTO: 94 FL (ref 79–100)
PLATELET # BLD AUTO: 265 X10^3/UL (ref 140–400)
POTASSIUM SERPL-SCNC: 4.1 MMOL/L (ref 3.5–5.1)
PROT SERPL-MCNC: 5.9 G/DL (ref 6.4–8.2)
RBC # BLD AUTO: 4.82 X10^6/UL (ref 4.3–5.7)
SODIUM SERPL-SCNC: 139 MMOL/L (ref 136–145)
WBC # BLD AUTO: 10.2 X10^3/UL (ref 4–11)

## 2021-07-30 RX ADMIN — ACETAMINOPHEN PRN MG: 325 TABLET, FILM COATED ORAL at 21:28

## 2021-07-30 RX ADMIN — WATER PRN ML: 1 INJECTION INTRAVENOUS at 00:34

## 2021-07-30 RX ADMIN — IPRATROPIUM BROMIDE AND ALBUTEROL SCH PUFF: 20; 100 SPRAY, METERED RESPIRATORY (INHALATION) at 16:03

## 2021-07-30 RX ADMIN — REMDESIVIR SCH MLS/HR: 100 INJECTION, POWDER, LYOPHILIZED, FOR SOLUTION INTRAVENOUS at 21:24

## 2021-07-30 RX ADMIN — IPRATROPIUM BROMIDE AND ALBUTEROL SCH PUFF: 20; 100 SPRAY, METERED RESPIRATORY (INHALATION) at 12:45

## 2021-07-30 RX ADMIN — MORPHINE SULFATE PRN MG: 2 INJECTION, SOLUTION INTRAMUSCULAR; INTRAVENOUS at 23:22

## 2021-07-30 RX ADMIN — FAMOTIDINE SCH MG: 20 TABLET ORAL at 21:24

## 2021-07-30 RX ADMIN — Medication SCH MG: at 09:18

## 2021-07-30 RX ADMIN — CHOLECALCIFEROL CAP 125 MCG (5000 UNIT) SCH UNIT: 125 CAP at 09:18

## 2021-07-30 RX ADMIN — IPRATROPIUM BROMIDE AND ALBUTEROL SCH PUFF: 20; 100 SPRAY, METERED RESPIRATORY (INHALATION) at 09:18

## 2021-07-30 RX ADMIN — MORPHINE SULFATE PRN MG: 2 INJECTION, SOLUTION INTRAMUSCULAR; INTRAVENOUS at 21:26

## 2021-07-30 RX ADMIN — METHYLPREDNISOLONE SODIUM SUCCINATE SCH MG: 125 INJECTION, POWDER, FOR SOLUTION INTRAMUSCULAR; INTRAVENOUS at 21:25

## 2021-07-30 RX ADMIN — METHYLPREDNISOLONE SODIUM SUCCINATE SCH MG: 125 INJECTION, POWDER, FOR SOLUTION INTRAMUSCULAR; INTRAVENOUS at 13:15

## 2021-07-30 RX ADMIN — ENOXAPARIN SODIUM SCH MG: 40 INJECTION SUBCUTANEOUS at 21:25

## 2021-07-30 RX ADMIN — METHYLPREDNISOLONE SODIUM SUCCINATE SCH MG: 125 INJECTION, POWDER, FOR SOLUTION INTRAMUSCULAR; INTRAVENOUS at 05:35

## 2021-07-30 RX ADMIN — CETIRIZINE HYDROCHLORIDE SCH MG: 10 TABLET, FILM COATED ORAL at 09:19

## 2021-07-30 RX ADMIN — IPRATROPIUM BROMIDE AND ALBUTEROL SCH PUFF: 20; 100 SPRAY, METERED RESPIRATORY (INHALATION) at 21:24

## 2021-07-30 RX ADMIN — ZINC SULFATE CAP 220 MG (50 MG ELEMENTAL ZN) SCH MG: 220 (50 ZN) CAP at 09:19

## 2021-07-30 NOTE — PDOC
PULMONARY PROGRESS NOTES


DATE: 7/30/21 


TIME: 09:13


Subjective


Pt. remains on 15 liters NC 


anxious on exam


Vitals





Vital Signs








  Date Time  Temp Pulse Resp B/P (MAP) Pulse Ox O2 Delivery O2 Flow Rate FiO2


 


7/30/21 07:00 97.9 64 20 147/88 (107) 95 BiPAP/CPAP  





 97.9       


 


7/30/21 03:36       40.0 








Comments


Pt. seen during covid -19 pandemic visual exam preformed 


RRR


on NC o2


no distress 


no obvious rash or edema


Labs





Laboratory Tests








Test


 7/29/21


11:56 7/30/21


03:30


 


O2 Saturation 96 % (92-99)  


 


Arterial Blood pH


 7.44


(7.35-7.45) 





 


Arterial Blood pCO2 at


Patient Temp 46 mmHg


(35-46) 





 


Arterial Blood pO2 at Patient


Temp 85 mmHg


() 





 


Arterial Blood HCO3


 31 mmol/L


(21-28) 





 


Arterial Blood Base Excess


 6 mmol/L


(-3-3) 





 


FiO2 100  


 


White Blood Count


 


 10.2 x10^3/uL


(4.0-11.0)


 


Red Blood Count


 


 4.82 x10^6/uL


(4.30-5.70)


 


Hemoglobin


 


 15.2 g/dL


(13.0-17.5)


 


Hematocrit


 


 45.1 %


(39.0-53.0)


 


Mean Corpuscular Volume  94 fL () 


 


Mean Corpuscular Hemoglobin  32 pg (25-35) 


 


Mean Corpuscular Hemoglobin


Concent 


 34 g/dL


(31-37)


 


Red Cell Distribution Width


 


 12.8 %


(11.5-14.5)


 


Platelet Count


 


 265 x10^3/uL


(140-400)


 


Sodium Level


 


 139 mmol/L


(136-145)


 


Potassium Level


 


 4.1 mmol/L


(3.5-5.1)


 


Chloride Level


 


 103 mmol/L


()


 


Carbon Dioxide Level


 


 33 mmol/L


(21-32)


 


Anion Gap  3 (6-14) 


 


Blood Urea Nitrogen


 


 30 mg/dL


(8-26)


 


Creatinine


 


 0.8 mg/dL


(0.7-1.3)


 


Estimated GFR


(Cockcroft-Gault) 


 100.0 





 


BUN/Creatinine Ratio  38 (6-20) 


 


Glucose Level


 


 140 mg/dL


(70-99)


 


Calcium Level


 


 8.7 mg/dL


(8.5-10.1)


 


Total Bilirubin


 


 1.1 mg/dL


(0.2-1.0)


 


Aspartate Amino Transf


(AST/SGOT) 


 141 U/L


(15-37)


 


Alanine Aminotransferase


(ALT/SGPT) 


 196 U/L


(16-63)


 


Alkaline Phosphatase


 


 50 U/L


()


 


Total Protein


 


 5.9 g/dL


(6.4-8.2)


 


Albumin


 


 2.3 g/dL


(3.4-5.0)


 


Albumin/Globulin Ratio  0.6 (1.0-1.7) 








Laboratory Tests








Test


 7/29/21


11:56 7/30/21


03:30


 


O2 Saturation 96 % (92-99)  


 


Arterial Blood pH


 7.44


(7.35-7.45) 





 


Arterial Blood pCO2 at


Patient Temp 46 mmHg


(35-46) 





 


Arterial Blood pO2 at Patient


Temp 85 mmHg


() 





 


Arterial Blood HCO3


 31 mmol/L


(21-28) 





 


Arterial Blood Base Excess


 6 mmol/L


(-3-3) 





 


FiO2 100  


 


White Blood Count


 


 10.2 x10^3/uL


(4.0-11.0)


 


Red Blood Count


 


 4.82 x10^6/uL


(4.30-5.70)


 


Hemoglobin


 


 15.2 g/dL


(13.0-17.5)


 


Hematocrit


 


 45.1 %


(39.0-53.0)


 


Mean Corpuscular Volume  94 fL () 


 


Mean Corpuscular Hemoglobin  32 pg (25-35) 


 


Mean Corpuscular Hemoglobin


Concent 


 34 g/dL


(31-37)


 


Red Cell Distribution Width


 


 12.8 %


(11.5-14.5)


 


Platelet Count


 


 265 x10^3/uL


(140-400)


 


Sodium Level


 


 139 mmol/L


(136-145)


 


Potassium Level


 


 4.1 mmol/L


(3.5-5.1)


 


Chloride Level


 


 103 mmol/L


()


 


Carbon Dioxide Level


 


 33 mmol/L


(21-32)


 


Anion Gap  3 (6-14) 


 


Blood Urea Nitrogen


 


 30 mg/dL


(8-26)


 


Creatinine


 


 0.8 mg/dL


(0.7-1.3)


 


Estimated GFR


(Cockcroft-Gault) 


 100.0 





 


BUN/Creatinine Ratio  38 (6-20) 


 


Glucose Level


 


 140 mg/dL


(70-99)


 


Calcium Level


 


 8.7 mg/dL


(8.5-10.1)


 


Total Bilirubin


 


 1.1 mg/dL


(0.2-1.0)


 


Aspartate Amino Transf


(AST/SGOT) 


 141 U/L


(15-37)


 


Alanine Aminotransferase


(ALT/SGPT) 


 196 U/L


(16-63)


 


Alkaline Phosphatase


 


 50 U/L


()


 


Total Protein


 


 5.9 g/dL


(6.4-8.2)


 


Albumin


 


 2.3 g/dL


(3.4-5.0)


 


Albumin/Globulin Ratio  0.6 (1.0-1.7) 








Medications





Active Scripts








 Medications  Dose


 Route/Sig


 Max Daily Dose Days Date Category Dose


Instructions


 


 Morphine Sulfate


 2 Mg/1 Ml


 Cartridge  2 Mg


 IV PRN Q4HRS PRN


   7/27/21 Reported 


 


 Zinc Sulfate 50


 Mg Tablet  220 Mg


 PO DAILY


   7/27/21 Reported 


 


 Methylprednisolone


 Sod Succ 125 Mg


 Vial  60 Mg


 IJ Q8HRS


   7/27/21 Reported 


 


 Combivent


 Respimat Inhal


  (Ipratropium/Albuterol


 Sulfate) 4 Gm


 Aer.w.adap  2 Inh


 IH QID


   7/27/21 Reported 


 


 Pepcid


  (Famotidine) 20


 Mg Tablet  20 Mg


 PO HS


   7/27/21 Reported 


 


 Lovenox


  (Enoxaparin


 Sodium) 30 Mg/0.3


 Ml Disp.syrin  30 Mg


 SQ DAILY


   7/27/21 Reported 


 


 Vitamin D3 **


  (Vitamin D) 125


 Mcg Capsule  125 Mcg


 PO DAILY


   7/27/21 Reported  5,000 UNITS = 125 MCG


 


 Zyrtec


  (Cetirizine Hcl)


 10 Mg Tablet  1 Tab


 PO DAILY


   7/27/21 Reported 


 


 Ascorbic Acid 500


 Mg Tablet  2 Mg


 PO DAILY


   7/27/21 Reported 


 


 Acetaminophen 325


 Mg Tablet  2 Tab


 PO PRN Q6HRS PRN


  30 7/27/21 Reported 


 


 No Known


 Medications Prior


 To Admisstion


  (Info)  Each  1 Each


 


   8/25/20 Reported 











Impression


.


IMPRESSION:


1.  Acute hypoxemic respiratory failure secondary to COVID-19 viral pneumonia.


2.  COVID-19 viral pneumonia.


3.  Abnormal chest x-ray, compatible with viral pneumonia.





Plan


.


Updated 7/29/21


Continue supplemental oxygen to keep sats above 92%, on 15 liters NC, may 

require Vapotherm  


BIPAP PRN


Continue Remdesivir


Continue steroids.


PRN tx of Anxiety 


D/W RN and RT 











PLAN:


1.  We will continue support with oxygen supplementation.  If required, we will 


increase to either nonrebreather or Vapotherm.


2.  Continue Remdesivir and steroids.


3.  Follow labs.





The above was discussed with the wife.


I do appreciate the privilege in sharing in the patient's care.











JUDY COLEMAN MD              Jul 30, 2021 09:13

## 2021-07-30 NOTE — NUR
SS following up with discharge planning. SS reviewed pt chart and discussed with pt RN. Pt 
is currently on the BIPAP at 100%. COVID19 positive. Pt on IV Remdesivir and IV Solu Medrol. 
SS will continue to follow for discharge planning.

## 2021-07-30 NOTE — PDOC
[facility] PROGRESS NOTE


Date of Service


DOS:


DATE: 7/30/21 


TIME: 10:52





Chief Complaint


Chief Complaint


Acute hypoxic respiratory failure


COVID-19 pneumonia requiring BiPAP


Obesity class I








Admit to medicine for further management


Pulmonology consult


ID consult


Continue IV Remdesivir


Continue IV thiamine and vitamin C


Continue IV steroids


Pending ferritin, LDH, CRP, D-dimer labs


Titrate O2 supplementation to maintain O2 saturation greater than 92%


Lovenox for DVT prophylaxis


Protonix while on steroids GI prophylaxis


ADA diet


Full code


Discussed with RN and SW


Disposition patient management as above


Surrogate decision maker is the wife





History of Present Illness


History of Present Illness


56-year-old male with no significant past medical history except for 

degenerative joint disease who was transferred from [facility] 

yesterday with complaints of fever, headache, dry cough, shortness of breath for

the past 3 days.  Patient stated that he was actually exposed from his son he 

was worsening and required to come to the ED at [facility] and he was found to 

be hypoxic and required 2 L of nasal cannula oxygen.  Chest x-ray over there 

showed bilateral mild bibasilar infiltrates.  Covid test was positive and 

patient was transferred to [facility] for pulmonary evaluation and treatment.  Patient 

was started on Remdesivir and IV steroids.  Currently patient is requiring 15 L 

high flow nasal cannula and IV steroids.





7/29/2021


No acute events overnight.  Patient was saturating 92% on 15 L nasal cannula and

he had desatted down to 70% after he had to blow his nose.  Patient was placed 

on BiPAP at 100% FiO2.  Chest leg appears to be worsening.  Patient's chart, l

abs, images were reviewed and discussed with RN





1/30/2021


No acute events overnight.  Patient saturating 96% on Vapotherm and BiPAP.  

Patient's chart, labs, images were reviewed and discussed with RN





Vitals/I&O


Vitals/I&O:





                                   Vital Signs








  Date Time  Temp Pulse Resp B/P (MAP) Pulse Ox O2 Delivery O2 Flow Rate FiO2


 


7/30/21 10:10     92 BiPAP/CPAP  


 


7/30/21 07:00 97.9 64 20 147/88 (107)    





 97.9       


 


7/30/21 03:36       40.0 














                                    I & O   


 


 7/29/21 7/29/21 7/30/21





 14:59 22:59 06:59


 


Intake Total  0 ml 0 ml


 


Output Total   100 ml


 


Balance  0 ml -100 ml











Physical Exam


Physical Exam:


GEN axo3 male in nad


HEENT:  Normocephalic, atraumatic.  Anicteric.  No thrush.


NECK:  Supple, no JVD.


LUNGS:  Crackles bilaterally.  No accessory muscle use.


HEART:  S1, S2.  No murmurs.


ABDOMEN:  Soft, nontender, nondistended.  Bowel sounds present.


EXTREMITIES:  No edema, no cyanosis.


DERMATOLOGIC:  Warm, dry, no generalized rash.


NEUROLOGIC:  Alert, oriented, grossly nonfocal.


PSYCHIATRIC:  Calm and cooperative.  PIV looks clean.


General:  Alert, No acute distress


Heart:  Regular rate


Lungs:  Clear


Abdomen:  Normal bowel sounds


Extremities:  No clubbing


Skin:  No rashes, No significant lesion





Labs


Labs:





Laboratory Tests








Test


 7/29/21


11:56 7/30/21


03:30


 


O2 Saturation 96 % (92-99)  


 


Arterial Blood pH


 7.44


(7.35-7.45) 





 


Arterial Blood pCO2 at


Patient Temp 46 mmHg


(35-46) 





 


Arterial Blood pO2 at Patient


Temp 85 mmHg


() 





 


Arterial Blood HCO3


 31 mmol/L


(21-28) 





 


Arterial Blood Base Excess


 6 mmol/L


(-3-3) 





 


FiO2 100  


 


White Blood Count


 


 10.2 x10^3/uL


(4.0-11.0)


 


Red Blood Count


 


 4.82 x10^6/uL


(4.30-5.70)


 


Hemoglobin


 


 15.2 g/dL


(13.0-17.5)


 


Hematocrit


 


 45.1 %


(39.0-53.0)


 


Mean Corpuscular Volume  94 fL () 


 


Mean Corpuscular Hemoglobin  32 pg (25-35) 


 


Mean Corpuscular Hemoglobin


Concent 


 34 g/dL


(31-37)


 


Red Cell Distribution Width


 


 12.8 %


(11.5-14.5)


 


Platelet Count


 


 265 x10^3/uL


(140-400)


 


Sodium Level


 


 139 mmol/L


(136-145)


 


Potassium Level


 


 4.1 mmol/L


(3.5-5.1)


 


Chloride Level


 


 103 mmol/L


()


 


Carbon Dioxide Level


 


 33 mmol/L


(21-32)


 


Anion Gap  3 (6-14) 


 


Blood Urea Nitrogen


 


 30 mg/dL


(8-26)


 


Creatinine


 


 0.8 mg/dL


(0.7-1.3)


 


Estimated GFR


(Cockcroft-Gault) 


 100.0 





 


BUN/Creatinine Ratio  38 (6-20) 


 


Glucose Level


 


 140 mg/dL


(70-99)


 


Calcium Level


 


 8.7 mg/dL


(8.5-10.1)


 


Total Bilirubin


 


 1.1 mg/dL


(0.2-1.0)


 


Aspartate Amino Transf


(AST/SGOT) 


 141 U/L


(15-37)


 


Alanine Aminotransferase


(ALT/SGPT) 


 196 U/L


(16-63)


 


Alkaline Phosphatase


 


 50 U/L


()


 


Total Protein


 


 5.9 g/dL


(6.4-8.2)


 


Albumin


 


 2.3 g/dL


(3.4-5.0)


 


Albumin/Globulin Ratio  0.6 (1.0-1.7) 











Comment


Review of Relevant


I have reviewed the following items genesis (where applicable) has been applied.


Medications:





Current Medications








 Medications


  (Trade)  Dose


 Ordered  Sig/Shayy


 Route


 PRN Reason  Start Time


 Stop Time Status Last Admin


Dose Admin


 


 Lorazepam


  (Ativan Inj)  1 mg  PRN Q8HRS  PRN


 IVP


 ANXIETY / AGITATION  7/29/21 11:15


    7/29/21 14:05





 


 Thiamine


 Mononitrate


  (Vitamin B-1)  100 mg  DAILY


 PO


   7/30/21 09:00


    7/30/21 09:18





 


 Sterile Water


  (WATER for RESP)  1,000 ml  CONT  PRN


 INH


 VIA VAPOTHERM DEVICE  7/29/21 19:15


    7/30/21 00:34














Justifications for Admission


Other Justification














KATHY AYON MD                  Jul 30, 2021 10:53

## 2021-07-30 NOTE — PDOC
Infectious Disease Note


Subjective:


Subjective


Patient on BiPAP


Discussed with RN





Vital Signs:


Vital Signs





Vital Signs








  Date Time  Temp Pulse Resp B/P (MAP) Pulse Ox O2 Delivery O2 Flow Rate FiO2


 


7/30/21 07:00 97.9 64 20 147/88 (107) 95 BiPAP/CPAP  





 97.9       


 


7/30/21 03:36       40.0 











Physical Exam:


PHYSICAL EXAM


GEN axo3 male in nad


HEENT:  Normocephalic, atraumatic.  Anicteric.  No thrush.


NECK:  Supple, no JVD.


LUNGS:  Crackles bilaterally.  No accessory muscle use.


HEART:  S1, S2.  No murmurs.


ABDOMEN:  Soft, nontender, nondistended.  Bowel sounds present.


EXTREMITIES:  No edema, no cyanosis.


DERMATOLOGIC:  Warm, dry, no generalized rash.


NEUROLOGIC:  Alert, oriented, grossly nonfocal.


PSYCHIATRIC:  Calm and cooperative.  PIV looks clean.





Medications:


Inpatient Meds:


Medications reviewed.





Labs:


Lab





Laboratory Tests








Test


 7/29/21


11:56 7/30/21


03:30


 


O2 Saturation 96 % (92-99)  


 


Arterial Blood pH


 7.44


(7.35-7.45) 





 


Arterial Blood pCO2 at


Patient Temp 46 mmHg


(35-46) 





 


Arterial Blood pO2 at Patient


Temp 85 mmHg


() 





 


Arterial Blood HCO3


 31 mmol/L


(21-28) 





 


Arterial Blood Base Excess


 6 mmol/L


(-3-3) 





 


FiO2 100  


 


White Blood Count


 


 10.2 x10^3/uL


(4.0-11.0)


 


Red Blood Count


 


 4.82 x10^6/uL


(4.30-5.70)


 


Hemoglobin


 


 15.2 g/dL


(13.0-17.5)


 


Hematocrit


 


 45.1 %


(39.0-53.0)


 


Mean Corpuscular Volume  94 fL () 


 


Mean Corpuscular Hemoglobin  32 pg (25-35) 


 


Mean Corpuscular Hemoglobin


Concent 


 34 g/dL


(31-37)


 


Red Cell Distribution Width


 


 12.8 %


(11.5-14.5)


 


Platelet Count


 


 265 x10^3/uL


(140-400)


 


Sodium Level


 


 139 mmol/L


(136-145)


 


Potassium Level


 


 4.1 mmol/L


(3.5-5.1)


 


Chloride Level


 


 103 mmol/L


()


 


Carbon Dioxide Level


 


 33 mmol/L


(21-32)


 


Anion Gap  3 (6-14) 


 


Blood Urea Nitrogen


 


 30 mg/dL


(8-26)


 


Creatinine


 


 0.8 mg/dL


(0.7-1.3)


 


Estimated GFR


(Cockcroft-Gault) 


 100.0 





 


BUN/Creatinine Ratio  38 (6-20) 


 


Glucose Level


 


 140 mg/dL


(70-99)


 


Calcium Level


 


 8.7 mg/dL


(8.5-10.1)


 


Total Bilirubin


 


 1.1 mg/dL


(0.2-1.0)


 


Aspartate Amino Transf


(AST/SGOT) 


 141 U/L


(15-37)


 


Alanine Aminotransferase


(ALT/SGPT) 


 196 U/L


(16-63)


 


Alkaline Phosphatase


 


 50 U/L


()


 


Total Protein


 


 5.9 g/dL


(6.4-8.2)


 


Albumin


 


 2.3 g/dL


(3.4-5.0)


 


Albumin/Globulin Ratio  0.6 (1.0-1.7) 











Objective:


Assessment:


 


1.  COVID-19 pneumonia.


2.  Acute hypoxic respiratory failure.


3.  Degenerative joint disease.


4.  Lymphopenia.


5.  Hypokalemia.


6.  Abnormal LFTs.





Plan:


Plan of Care


 


1.  Continue supportive care.


2.  Follow up labs and cultures.


3.on  remdesivir and steroids per primary.


4.  Maintain aspiration precaution.


5.  Monitor LFTs closely





 .  Discussed with nursing staff.











SHERLEY HASSAN MD           Jul 30, 2021 10:01

## 2021-07-30 NOTE — PDOC
PULMONARY PROGRESS NOTES


DATE: 7/30/21 


TIME: 09:13


Subjective


Pt. is resting on BIPAP 


tolerating BIPAP well


Vitals





Vital Signs








  Date Time  Temp Pulse Resp B/P (MAP) Pulse Ox O2 Delivery O2 Flow Rate FiO2


 


7/30/21 07:00 97.9 64 20 147/88 (107) 95 BiPAP/CPAP  





 97.9       


 


7/30/21 03:36       40.0 








Comments


Pt. seen during covid -19 pandemic visual exam preformed 


RRR


BIPAP


no distress 


no obvious rash or edema


Labs





Laboratory Tests








Test


 7/29/21


11:56 7/30/21


03:30


 


O2 Saturation 96 % (92-99)  


 


Arterial Blood pH


 7.44


(7.35-7.45) 





 


Arterial Blood pCO2 at


Patient Temp 46 mmHg


(35-46) 





 


Arterial Blood pO2 at Patient


Temp 85 mmHg


() 





 


Arterial Blood HCO3


 31 mmol/L


(21-28) 





 


Arterial Blood Base Excess


 6 mmol/L


(-3-3) 





 


FiO2 100  


 


White Blood Count


 


 10.2 x10^3/uL


(4.0-11.0)


 


Red Blood Count


 


 4.82 x10^6/uL


(4.30-5.70)


 


Hemoglobin


 


 15.2 g/dL


(13.0-17.5)


 


Hematocrit


 


 45.1 %


(39.0-53.0)


 


Mean Corpuscular Volume  94 fL () 


 


Mean Corpuscular Hemoglobin  32 pg (25-35) 


 


Mean Corpuscular Hemoglobin


Concent 


 34 g/dL


(31-37)


 


Red Cell Distribution Width


 


 12.8 %


(11.5-14.5)


 


Platelet Count


 


 265 x10^3/uL


(140-400)


 


Sodium Level


 


 139 mmol/L


(136-145)


 


Potassium Level


 


 4.1 mmol/L


(3.5-5.1)


 


Chloride Level


 


 103 mmol/L


()


 


Carbon Dioxide Level


 


 33 mmol/L


(21-32)


 


Anion Gap  3 (6-14) 


 


Blood Urea Nitrogen


 


 30 mg/dL


(8-26)


 


Creatinine


 


 0.8 mg/dL


(0.7-1.3)


 


Estimated GFR


(Cockcroft-Gault) 


 100.0 





 


BUN/Creatinine Ratio  38 (6-20) 


 


Glucose Level


 


 140 mg/dL


(70-99)


 


Calcium Level


 


 8.7 mg/dL


(8.5-10.1)


 


Total Bilirubin


 


 1.1 mg/dL


(0.2-1.0)


 


Aspartate Amino Transf


(AST/SGOT) 


 141 U/L


(15-37)


 


Alanine Aminotransferase


(ALT/SGPT) 


 196 U/L


(16-63)


 


Alkaline Phosphatase


 


 50 U/L


()


 


Total Protein


 


 5.9 g/dL


(6.4-8.2)


 


Albumin


 


 2.3 g/dL


(3.4-5.0)


 


Albumin/Globulin Ratio  0.6 (1.0-1.7) 








Laboratory Tests








Test


 7/29/21


11:56 7/30/21


03:30


 


O2 Saturation 96 % (92-99)  


 


Arterial Blood pH


 7.44


(7.35-7.45) 





 


Arterial Blood pCO2 at


Patient Temp 46 mmHg


(35-46) 





 


Arterial Blood pO2 at Patient


Temp 85 mmHg


() 





 


Arterial Blood HCO3


 31 mmol/L


(21-28) 





 


Arterial Blood Base Excess


 6 mmol/L


(-3-3) 





 


FiO2 100  


 


White Blood Count


 


 10.2 x10^3/uL


(4.0-11.0)


 


Red Blood Count


 


 4.82 x10^6/uL


(4.30-5.70)


 


Hemoglobin


 


 15.2 g/dL


(13.0-17.5)


 


Hematocrit


 


 45.1 %


(39.0-53.0)


 


Mean Corpuscular Volume  94 fL () 


 


Mean Corpuscular Hemoglobin  32 pg (25-35) 


 


Mean Corpuscular Hemoglobin


Concent 


 34 g/dL


(31-37)


 


Red Cell Distribution Width


 


 12.8 %


(11.5-14.5)


 


Platelet Count


 


 265 x10^3/uL


(140-400)


 


Sodium Level


 


 139 mmol/L


(136-145)


 


Potassium Level


 


 4.1 mmol/L


(3.5-5.1)


 


Chloride Level


 


 103 mmol/L


()


 


Carbon Dioxide Level


 


 33 mmol/L


(21-32)


 


Anion Gap  3 (6-14) 


 


Blood Urea Nitrogen


 


 30 mg/dL


(8-26)


 


Creatinine


 


 0.8 mg/dL


(0.7-1.3)


 


Estimated GFR


(Cockcroft-Gault) 


 100.0 





 


BUN/Creatinine Ratio  38 (6-20) 


 


Glucose Level


 


 140 mg/dL


(70-99)


 


Calcium Level


 


 8.7 mg/dL


(8.5-10.1)


 


Total Bilirubin


 


 1.1 mg/dL


(0.2-1.0)


 


Aspartate Amino Transf


(AST/SGOT) 


 141 U/L


(15-37)


 


Alanine Aminotransferase


(ALT/SGPT) 


 196 U/L


(16-63)


 


Alkaline Phosphatase


 


 50 U/L


()


 


Total Protein


 


 5.9 g/dL


(6.4-8.2)


 


Albumin


 


 2.3 g/dL


(3.4-5.0)


 


Albumin/Globulin Ratio  0.6 (1.0-1.7) 








Medications





Active Scripts








 Medications  Dose


 Route/Sig


 Max Daily Dose Days Date Category Dose


Instructions


 


 Morphine Sulfate


 2 Mg/1 Ml


 Cartridge  2 Mg


 IV PRN Q4HRS PRN


   7/27/21 Reported 


 


 Zinc Sulfate 50


 Mg Tablet  220 Mg


 PO DAILY


   7/27/21 Reported 


 


 Methylprednisolone


 Sod Succ 125 Mg


 Vial  60 Mg


 IJ Q8HRS


   7/27/21 Reported 


 


 Combivent


 Respimat Inhal


  (Ipratropium/Albuterol


 Sulfate) 4 Gm


 Aer.w.adap  2 Inh


 IH QID


   7/27/21 Reported 


 


 Pepcid


  (Famotidine) 20


 Mg Tablet  20 Mg


 PO HS


   7/27/21 Reported 


 


 Lovenox


  (Enoxaparin


 Sodium) 30 Mg/0.3


 Ml Disp.syrin  30 Mg


 SQ DAILY


   7/27/21 Reported 


 


 Vitamin D3 **


  (Vitamin D) 125


 Mcg Capsule  125 Mcg


 PO DAILY


   7/27/21 Reported  5,000 UNITS = 125 MCG


 


 Zyrtec


  (Cetirizine Hcl)


 10 Mg Tablet  1 Tab


 PO DAILY


   7/27/21 Reported 


 


 Ascorbic Acid 500


 Mg Tablet  2 Mg


 PO DAILY


   7/27/21 Reported 


 


 Acetaminophen 325


 Mg Tablet  2 Tab


 PO PRN Q6HRS PRN


  30 7/27/21 Reported 


 


 No Known


 Medications Prior


 To Admisstion


  (Info)  Each  1 Each


 


   8/25/20 Reported 











Impression


.


IMPRESSION:


1.  Acute hypoxemic respiratory failure secondary to COVID-19 viral pneumonia.


2.  COVID-19 viral pneumonia.


3.  Abnormal chest x-ray, compatible with viral pneumonia.





Plan


.


Updated 7/30/21


Continue supplemental oxygen to keep sats above 92%, 


Continue BIPAP


Follow ID recs for ABX  


Continue Remdesivir for full course 


Continue steroids for full ten day course 


DC benzo pt. didn't tolerate well, obtain EKG and start IV haldol for anxiety


D/W RN and RT 


Discussed with Wife at bedside 








Updated 7/29/21


Continue supplemental oxygen to keep sats above 92%, on 15 liters NC, may 

require Vapotherm  


BIPAP PRN


Continue Remdesivir


Continue steroids.


PRN tx of Anxiety 


D/W RN and RT











JUDY COLEMAN MD              Jul 30, 2021 09:13

## 2021-07-31 VITALS — SYSTOLIC BLOOD PRESSURE: 141 MMHG | DIASTOLIC BLOOD PRESSURE: 86 MMHG

## 2021-07-31 VITALS — DIASTOLIC BLOOD PRESSURE: 89 MMHG | SYSTOLIC BLOOD PRESSURE: 143 MMHG

## 2021-07-31 VITALS — DIASTOLIC BLOOD PRESSURE: 83 MMHG | SYSTOLIC BLOOD PRESSURE: 138 MMHG

## 2021-07-31 VITALS — SYSTOLIC BLOOD PRESSURE: 142 MMHG | DIASTOLIC BLOOD PRESSURE: 85 MMHG

## 2021-07-31 VITALS — DIASTOLIC BLOOD PRESSURE: 87 MMHG | SYSTOLIC BLOOD PRESSURE: 140 MMHG

## 2021-07-31 VITALS — SYSTOLIC BLOOD PRESSURE: 139 MMHG | DIASTOLIC BLOOD PRESSURE: 83 MMHG

## 2021-07-31 RX ADMIN — DEXTROSE, SODIUM CHLORIDE, AND POTASSIUM CHLORIDE SCH MLS/HR: 5; .45; .15 INJECTION INTRAVENOUS at 21:21

## 2021-07-31 RX ADMIN — IPRATROPIUM BROMIDE AND ALBUTEROL SCH PUFF: 20; 100 SPRAY, METERED RESPIRATORY (INHALATION) at 15:17

## 2021-07-31 RX ADMIN — MORPHINE SULFATE PRN MG: 2 INJECTION, SOLUTION INTRAMUSCULAR; INTRAVENOUS at 21:23

## 2021-07-31 RX ADMIN — Medication SCH MG: at 09:27

## 2021-07-31 RX ADMIN — ACETAMINOPHEN PRN MG: 325 TABLET, FILM COATED ORAL at 21:22

## 2021-07-31 RX ADMIN — METHYLPREDNISOLONE SODIUM SUCCINATE SCH MG: 125 INJECTION, POWDER, FOR SOLUTION INTRAMUSCULAR; INTRAVENOUS at 15:05

## 2021-07-31 RX ADMIN — METHYLPREDNISOLONE SODIUM SUCCINATE SCH MG: 125 INJECTION, POWDER, FOR SOLUTION INTRAMUSCULAR; INTRAVENOUS at 06:15

## 2021-07-31 RX ADMIN — METHYLPREDNISOLONE SODIUM SUCCINATE SCH MG: 125 INJECTION, POWDER, FOR SOLUTION INTRAMUSCULAR; INTRAVENOUS at 21:22

## 2021-07-31 RX ADMIN — IPRATROPIUM BROMIDE AND ALBUTEROL SCH PUFF: 20; 100 SPRAY, METERED RESPIRATORY (INHALATION) at 20:00

## 2021-07-31 RX ADMIN — ENOXAPARIN SODIUM SCH MG: 40 INJECTION SUBCUTANEOUS at 21:21

## 2021-07-31 RX ADMIN — ZINC SULFATE CAP 220 MG (50 MG ELEMENTAL ZN) SCH MG: 220 (50 ZN) CAP at 09:27

## 2021-07-31 RX ADMIN — IPRATROPIUM BROMIDE AND ALBUTEROL SCH PUFF: 20; 100 SPRAY, METERED RESPIRATORY (INHALATION) at 08:00

## 2021-07-31 RX ADMIN — INSULIN LISPRO SCH UNITS: 100 INJECTION, SOLUTION INTRAVENOUS; SUBCUTANEOUS at 17:00

## 2021-07-31 RX ADMIN — REMDESIVIR SCH MLS/HR: 100 INJECTION, POWDER, LYOPHILIZED, FOR SOLUTION INTRAVENOUS at 21:20

## 2021-07-31 RX ADMIN — FAMOTIDINE SCH MG: 20 TABLET ORAL at 21:22

## 2021-07-31 RX ADMIN — CETIRIZINE HYDROCHLORIDE SCH MG: 10 TABLET, FILM COATED ORAL at 09:27

## 2021-07-31 RX ADMIN — DEXTROSE, SODIUM CHLORIDE, AND POTASSIUM CHLORIDE SCH MLS/HR: 5; .45; .15 INJECTION INTRAVENOUS at 11:36

## 2021-07-31 RX ADMIN — INSULIN LISPRO SCH UNITS: 100 INJECTION, SOLUTION INTRAVENOUS; SUBCUTANEOUS at 12:00

## 2021-07-31 RX ADMIN — IPRATROPIUM BROMIDE AND ALBUTEROL SCH PUFF: 20; 100 SPRAY, METERED RESPIRATORY (INHALATION) at 12:00

## 2021-07-31 RX ADMIN — CHOLECALCIFEROL CAP 125 MCG (5000 UNIT) SCH UNIT: 125 CAP at 09:27

## 2021-07-31 RX ADMIN — ACETAMINOPHEN PRN MG: 325 TABLET, FILM COATED ORAL at 09:33

## 2021-07-31 NOTE — PDOC
[facility] PROGRESS NOTE


Date of Service


DOS:


DATE: 7/31/21 


TIME: 09:52





Chief Complaint


Chief Complaint


SEPSIS, was POA


severe malnutrition was POA, with obesity, BMI 32


Acute hypoxic respiratory failure


COVID-19 pneumonia requiring BiPAP


transaminitis, 





 


 


ID consult


Continue IV Remdesivir


Continue IV thiamine and vitamin C


Continue IV steroids


Pending ferritin, LDH, CRP, D-dimer labs


Titrate O2 supplementation to maintain O2 saturation greater than 92%


Lovenox for DVT prophylaxis


Protonix while on steroids GI prophylaxis


ADA diet


Full code


Discussed with RN and SW


Disposition patient management as above


Surrogate decision maker is the wife





History of Present Illness


History of Present Illness


56-year-old male with no significant past medical history except for 

degenerative joint disease who was transferred from [facility] 

yesterday with complaints of fever, headache, dry cough, shortness of breath for

the past 3 days.  Patient stated that he was actually exposed from his son he 

was worsening and required to come to the ED at [facility] and he was found to 

be hypoxic and required 2 L of nasal cannula oxygen.  Chest x-ray over there 

showed bilateral mild bibasilar infiltrates.  Covid test was positive and 

patient was transferred to [facility] for pulmonary evaluation and treatment.  Patient 

was started on Remdesivir and IV steroids.  Currently patient is requiring 15 L 

high flow nasal cannula and IV steroids.





7/30/2021


No acute events overnight.  Patient was saturating 92% on 15 L nasal cannula and

he had desatted down to 70% after he had to blow his nose.  Patient was placed 

on BiPAP at 100% FiO2.  Chest leg appears to be worsening.  Patient's chart, 

labs, images were reviewed and discussed with BRIDGET joaquin Po inakem,


dry,   dark urine





Vitals/I&O


Vitals/I&O:





                                   Vital Signs








  Date Time  Temp Pulse Resp B/P (MAP) Pulse Ox O2 Delivery O2 Flow Rate FiO2


 


7/31/21 07:58     92 BiPAP/CPAP  


 


7/31/21 07:00 97.5 55 16 139/83 (101)    





 97.5       


 


7/30/21 23:52       40.0 














                                    I & O   


 


 7/30/21 7/30/21 7/31/21





 15:00 23:00 07:00


 


Intake Total  500 ml 0 ml


 


Output Total 600 ml  600 ml


 


Balance -600 ml 500 ml -600 ml











Physical Exam


Physical Exam:


GEN axo3 male in some mild resp distres


LIMTIED COVID EXAM .


NEUROLOGIC:  Alert, oriented, grossly nonfocal.


PSYCHIATRIC:  Calm and cooperative.


General:  Alert, mild distress


Heart:  Regular rate (tele reg,  reviewed)


Lungs:  Other (no gross rhonchi)


Extremities:  No clubbing





Labs


Labs:


cough is worse at night,  





LFT up noted





Review of Systems


Review of Systems:


cont current,    day 3/4 remdesivir





Comment


Review of Relevant


I have reviewed the following items genesis (where applicable) has been applied.





Justifications for Admission


Other Justification














DUNIA SHI MD                 Jul 31, 2021 09:55

## 2021-07-31 NOTE — PDOC
Infectious Disease Note


Subjective:


Subjective


Patient on BiPAP


Discussed with RN





Vital Signs:


Vital Signs





Vital Signs








  Date Time  Temp Pulse Resp B/P (MAP) Pulse Ox O2 Delivery O2 Flow Rate FiO2


 


7/31/21 04:00     96 BiPAP/CPAP  


 


7/31/21 03:50 97.7 58 32 142/85 (104)    





 97.7       


 


7/30/21 23:52       40.0 











Physical Exam:


PHYSICAL EXAM


GEN axo3 male in nad


HEENT:  Normocephalic, atraumatic.  Anicteric.  No thrush.


NECK:  Supple, no JVD.


LUNGS:  Crackles bilaterally.  No accessory muscle use.


HEART:  S1, S2.  No murmurs.


ABDOMEN:  Soft, nontender, nondistended.  Bowel sounds present.


EXTREMITIES:  No edema, no cyanosis.


DERMATOLOGIC:  Warm, dry, no generalized rash.


NEUROLOGIC:  Alert, oriented, grossly nonfocal.


PSYCHIATRIC:  Calm and cooperative.  PIV looks clean.





Medications:


Inpatient Meds:


Medications reviewed.





Objective:


Assessment:


 


1.  COVID-19 pneumonia.


2.  Acute hypoxic respiratory failure.


3.  Degenerative joint disease.


4.  Lymphopenia.


5.  Hypokalemia.


6.  Abnormal LFTs.





Plan:


Plan of Care


 


1.  Continue supportive care.


2.  Follow up labs and cultures.


3.on  remdesivir and steroids per primary.


4.  Maintain aspiration precaution.


5. Monitor lfts closely





 .  Discussed with nursing staff.











SHERLEY HASSAN MD           Jul 31, 2021 07:52

## 2021-07-31 NOTE — PDOC
PULMONARY PROGRESS NOTES


DATE: 7/31/21 


TIME: 13:39


Subjective


Patient's slept well





Currently on BiPAP





Not more short of breath


Vitals





Vital Signs








  Date Time  Temp Pulse Resp B/P (MAP) Pulse Ox O2 Delivery O2 Flow Rate FiO2


 


7/31/21 11:30     97 BiPAP/CPAP  


 


7/31/21 11:00 97.6 70 18 143/89 (107)    





 97.6       


 


7/30/21 23:52       40.0 








Comments


Pt. seen during covid -19 pandemic visual exam preformed 


RRR


BIPAP


no distress 


no obvious rash or edema


Labs





Laboratory Tests








Test


 7/30/21


03:30


 


White Blood Count


 10.2 x10^3/uL


(4.0-11.0)


 


Red Blood Count


 4.82 x10^6/uL


(4.30-5.70)


 


Hemoglobin


 15.2 g/dL


(13.0-17.5)


 


Hematocrit


 45.1 %


(39.0-53.0)


 


Mean Corpuscular Volume 94 fL () 


 


Mean Corpuscular Hemoglobin 32 pg (25-35) 


 


Mean Corpuscular Hemoglobin


Concent 34 g/dL


(31-37)


 


Red Cell Distribution Width


 12.8 %


(11.5-14.5)


 


Platelet Count


 265 x10^3/uL


(140-400)


 


Sodium Level


 139 mmol/L


(136-145)


 


Potassium Level


 4.1 mmol/L


(3.5-5.1)


 


Chloride Level


 103 mmol/L


()


 


Carbon Dioxide Level


 33 mmol/L


(21-32)


 


Anion Gap 3 (6-14) 


 


Blood Urea Nitrogen


 30 mg/dL


(8-26)


 


Creatinine


 0.8 mg/dL


(0.7-1.3)


 


Estimated GFR


(Cockcroft-Gault) 100.0 





 


BUN/Creatinine Ratio 38 (6-20) 


 


Glucose Level


 140 mg/dL


(70-99)


 


Calcium Level


 8.7 mg/dL


(8.5-10.1)


 


Total Bilirubin


 1.1 mg/dL


(0.2-1.0)


 


Aspartate Amino Transf


(AST/SGOT) 141 U/L


(15-37)


 


Alanine Aminotransferase


(ALT/SGPT) 196 U/L


(16-63)


 


Alkaline Phosphatase


 50 U/L


()


 


Total Protein


 5.9 g/dL


(6.4-8.2)


 


Albumin


 2.3 g/dL


(3.4-5.0)


 


Albumin/Globulin Ratio 0.6 (1.0-1.7) 








Medications





Active Scripts








 Medications  Dose


 Route/Sig


 Max Daily Dose Days Date Category Dose


Instructions


 


 Morphine Sulfate


 2 Mg/1 Ml


 Cartridge  2 Mg


 IV PRN Q4HRS PRN


   7/27/21 Reported 


 


 Zinc Sulfate 50


 Mg Tablet  220 Mg


 PO DAILY


   7/27/21 Reported 


 


 Methylprednisolone


 Sod Succ 125 Mg


 Vial  60 Mg


 IJ Q8HRS


   7/27/21 Reported 


 


 Combivent


 Respimat Inhal


  (Ipratropium/Albuterol


 Sulfate) 4 Gm


 Aer.w.adap  2 Inh


 IH QID


   7/27/21 Reported 


 


 Pepcid


  (Famotidine) 20


 Mg Tablet  20 Mg


 PO HS


   7/27/21 Reported 


 


 Lovenox


  (Enoxaparin


 Sodium) 30 Mg/0.3


 Ml Disp.syrin  30 Mg


 SQ DAILY


   7/27/21 Reported 


 


 Vitamin D3 **


  (Vitamin D) 125


 Mcg Capsule  125 Mcg


 PO DAILY


   7/27/21 Reported  5,000 UNITS = 125 MCG


 


 Zyrtec


  (Cetirizine Hcl)


 10 Mg Tablet  1 Tab


 PO DAILY


   7/27/21 Reported 


 


 Ascorbic Acid 500


 Mg Tablet  2 Mg


 PO DAILY


   7/27/21 Reported 


 


 Acetaminophen 325


 Mg Tablet  2 Tab


 PO PRN Q6HRS PRN


  30 7/27/21 Reported 


 


 No Known


 Medications Prior


 To Admisstion


  (Info)  Each  1 Each


 


   8/25/20 Reported 











Impression


.


IMPRESSION:


1.  Acute hypoxemic respiratory failure secondary to COVID-19 viral pneumonia.


2.  COVID-19 viral pneumonia.


3.  Abnormal chest x-ray, compatible with viral pneumonia.





Plan


.


Updated 7/31


Patient continues to do well with BiPAP


High flow oxygen when eating


Remdesivir


Steroids


As needed Haldol, for anxiety














Updated 7/30/21


Continue supplemental oxygen to keep sats above 92%, 


Continue BIPAP


Follow ID recs for [id number]  


Continue Remdesivir for full course 


Continue steroids for full ten day course 


DC benzo pt. didn't tolerate well, obtain EKG and start IV haldol for anxiety


D/W RN and RT 


Discussed with Wife at bedside 








Updated 7/29/21


Continue supplemental oxygen to keep sats above 92%, on 15 liters NC, may 

require Vapotherm  


BIPAP PRN


Continue Remdesivir


Continue steroids.


PRN tx of Anxiety 


D/W RN and RT











JUDY COLEMAN MD 31, 2021 13:41

## 2021-08-01 VITALS — DIASTOLIC BLOOD PRESSURE: 90 MMHG | SYSTOLIC BLOOD PRESSURE: 143 MMHG

## 2021-08-01 VITALS — SYSTOLIC BLOOD PRESSURE: 147 MMHG | DIASTOLIC BLOOD PRESSURE: 92 MMHG

## 2021-08-01 VITALS — DIASTOLIC BLOOD PRESSURE: 87 MMHG | SYSTOLIC BLOOD PRESSURE: 163 MMHG

## 2021-08-01 VITALS — DIASTOLIC BLOOD PRESSURE: 85 MMHG | SYSTOLIC BLOOD PRESSURE: 137 MMHG

## 2021-08-01 VITALS — DIASTOLIC BLOOD PRESSURE: 88 MMHG | SYSTOLIC BLOOD PRESSURE: 147 MMHG

## 2021-08-01 VITALS — DIASTOLIC BLOOD PRESSURE: 99 MMHG | SYSTOLIC BLOOD PRESSURE: 144 MMHG

## 2021-08-01 LAB
% BANDS: 6 % (ref 0–9)
% LYMPHS: 4 % (ref 24–48)
% SEGS: 90 % (ref 35–66)
ALBUMIN SERPL-MCNC: 2.4 G/DL (ref 3.4–5)
ALBUMIN/GLOB SERPL: 0.7 {RATIO} (ref 1–1.7)
ALP SERPL-CCNC: 54 U/L (ref 46–116)
ALT SERPL-CCNC: 254 U/L (ref 16–63)
ANION GAP SERPL CALC-SCNC: 4 MMOL/L (ref 6–14)
AST SERPL-CCNC: 83 U/L (ref 15–37)
BASOPHILS # BLD AUTO: 0 X10^3/UL (ref 0–0.2)
BASOPHILS NFR BLD: 0 % (ref 0–3)
BILIRUB SERPL-MCNC: 0.6 MG/DL (ref 0.2–1)
BUN SERPL-MCNC: 22 MG/DL (ref 8–26)
BUN/CREAT SERPL: 31 (ref 6–20)
CALCIUM SERPL-MCNC: 8.3 MG/DL (ref 8.5–10.1)
CHLORIDE SERPL-SCNC: 105 MMOL/L (ref 98–107)
CO2 SERPL-SCNC: 28 MMOL/L (ref 21–32)
CREAT SERPL-MCNC: 0.7 MG/DL (ref 0.7–1.3)
EOSINOPHIL NFR BLD: 0 % (ref 0–3)
EOSINOPHIL NFR BLD: 0 X10^3/UL (ref 0–0.7)
ERYTHROCYTE [DISTWIDTH] IN BLOOD BY AUTOMATED COUNT: 13 % (ref 11.5–14.5)
GFR SERPLBLD BASED ON 1.73 SQ M-ARVRAT: 116.7 ML/MIN
GLUCOSE SERPL-MCNC: 175 MG/DL (ref 70–99)
HCT VFR BLD CALC: 45.5 % (ref 39–53)
HGB BLD-MCNC: 15.4 G/DL (ref 13–17.5)
LYMPHOCYTES # BLD: 0.4 X10^3/UL (ref 1–4.8)
LYMPHOCYTES NFR BLD AUTO: 4 % (ref 24–48)
MCH RBC QN AUTO: 32 PG (ref 25–35)
MCHC RBC AUTO-ENTMCNC: 34 G/DL (ref 31–37)
MCV RBC AUTO: 95 FL (ref 79–100)
MONO #: 0.3 X10^3/UL (ref 0–1.1)
MONOCYTES NFR BLD: 3 % (ref 0–9)
NEUT #: 10 X10^3/UL (ref 1.8–7.7)
NEUTROPHILS NFR BLD AUTO: 93 % (ref 31–73)
PLATELET # BLD AUTO: 262 X10^3/UL (ref 140–400)
PLATELET # BLD EST: ADEQUATE 10*3/UL
POTASSIUM SERPL-SCNC: 4.8 MMOL/L (ref 3.5–5.1)
PROT SERPL-MCNC: 5.9 G/DL (ref 6.4–8.2)
RBC # BLD AUTO: 4.8 X10^6/UL (ref 4.3–5.7)
SODIUM SERPL-SCNC: 137 MMOL/L (ref 136–145)
WBC # BLD AUTO: 10.9 X10^3/UL (ref 4–11)

## 2021-08-01 RX ADMIN — DEXTROSE, SODIUM CHLORIDE, AND POTASSIUM CHLORIDE SCH MLS/HR: 5; .45; .15 INJECTION INTRAVENOUS at 16:54

## 2021-08-01 RX ADMIN — FAMOTIDINE SCH MG: 20 TABLET ORAL at 21:15

## 2021-08-01 RX ADMIN — ENOXAPARIN SODIUM SCH MG: 40 INJECTION SUBCUTANEOUS at 21:15

## 2021-08-01 RX ADMIN — CHOLECALCIFEROL CAP 125 MCG (5000 UNIT) SCH UNIT: 125 CAP at 08:26

## 2021-08-01 RX ADMIN — METHYLPREDNISOLONE SODIUM SUCCINATE SCH MG: 125 INJECTION, POWDER, FOR SOLUTION INTRAMUSCULAR; INTRAVENOUS at 05:54

## 2021-08-01 RX ADMIN — METHYLPREDNISOLONE SODIUM SUCCINATE SCH MG: 125 INJECTION, POWDER, FOR SOLUTION INTRAMUSCULAR; INTRAVENOUS at 15:13

## 2021-08-01 RX ADMIN — Medication SCH MG: at 11:47

## 2021-08-01 RX ADMIN — ZINC SULFATE CAP 220 MG (50 MG ELEMENTAL ZN) SCH MG: 220 (50 ZN) CAP at 08:26

## 2021-08-01 RX ADMIN — ACETAMINOPHEN PRN MG: 325 TABLET, FILM COATED ORAL at 21:15

## 2021-08-01 RX ADMIN — MORPHINE SULFATE PRN MG: 2 INJECTION, SOLUTION INTRAMUSCULAR; INTRAVENOUS at 21:16

## 2021-08-01 RX ADMIN — DEXTROSE, SODIUM CHLORIDE, AND POTASSIUM CHLORIDE SCH MLS/HR: 5; .45; .15 INJECTION INTRAVENOUS at 08:25

## 2021-08-01 RX ADMIN — CETIRIZINE HYDROCHLORIDE SCH MG: 10 TABLET, FILM COATED ORAL at 08:26

## 2021-08-01 RX ADMIN — IPRATROPIUM BROMIDE AND ALBUTEROL SCH PUFF: 20; 100 SPRAY, METERED RESPIRATORY (INHALATION) at 16:00

## 2021-08-01 RX ADMIN — IPRATROPIUM BROMIDE AND ALBUTEROL SCH PUFF: 20; 100 SPRAY, METERED RESPIRATORY (INHALATION) at 12:00

## 2021-08-01 RX ADMIN — IPRATROPIUM BROMIDE AND ALBUTEROL SCH PUFF: 20; 100 SPRAY, METERED RESPIRATORY (INHALATION) at 21:14

## 2021-08-01 RX ADMIN — Medication SCH MG: at 08:26

## 2021-08-01 RX ADMIN — METHYLPREDNISOLONE SODIUM SUCCINATE SCH MG: 125 INJECTION, POWDER, FOR SOLUTION INTRAMUSCULAR; INTRAVENOUS at 21:16

## 2021-08-01 RX ADMIN — IPRATROPIUM BROMIDE AND ALBUTEROL SCH PUFF: 20; 100 SPRAY, METERED RESPIRATORY (INHALATION) at 08:00

## 2021-08-01 RX ADMIN — INSULIN LISPRO SCH UNITS: 100 INJECTION, SOLUTION INTRAVENOUS; SUBCUTANEOUS at 08:00

## 2021-08-01 RX ADMIN — ACETAMINOPHEN PRN MG: 325 TABLET, FILM COATED ORAL at 08:26

## 2021-08-01 NOTE — PDOC
Infectious Disease Note


Subjective:


Subjective


Patient on BiPAP


Frustrated as he is not able to go home


Remdesivir discontinued yesterday


Discussed with RN





Vital Signs:


Vital Signs





Vital Signs








  Date Time  Temp Pulse Resp B/P (MAP) Pulse Ox O2 Delivery O2 Flow Rate FiO2


 


8/1/21 06:01     95 BiPAP/CPAP  


 


8/1/21 03:40 97.1 59 20 143/90 (107)    





 97.1       


 


7/31/21 21:53       80.0 











Physical Exam:


PHYSICAL EXAM


GEN axo3 male in some mild resp distres


LIMTIED COVID EXAM .


NEUROLOGIC:  Alert, oriented, grossly nonfocal.


PSYCHIATRIC:  Calm and cooperative.





Medications:


Inpatient Meds:


Medications reviewed.





Labs:


Lab





Laboratory Tests








Test


 7/31/21


21:06 8/1/21


04:00


 


Glucose (Fingerstick)


 156 mg/dL


(70-99) 





 


White Blood Count


 


 10.9 x10^3/uL


(4.0-11.0)


 


Red Blood Count


 


 4.80 x10^6/uL


(4.30-5.70)


 


Hemoglobin


 


 15.4 g/dL


(13.0-17.5)


 


Hematocrit


 


 45.5 %


(39.0-53.0)


 


Mean Corpuscular Volume  95 fL () 


 


Mean Corpuscular Hemoglobin  32 pg (25-35) 


 


Mean Corpuscular Hemoglobin


Concent 


 34 g/dL


(31-37)


 


Red Cell Distribution Width


 


 13.0 %


(11.5-14.5)


 


Platelet Count


 


 262 x10^3/uL


(140-400)


 


Neutrophils (%) (Auto)  93 % (31-73) 


 


Lymphocytes (%) (Auto)  4 % (24-48) 


 


Monocytes (%) (Auto)  3 % (0-9) 


 


Eosinophils (%) (Auto)  0 % (0-3) 


 


Basophils (%) (Auto)  0 % (0-3) 


 


Neutrophils # (Auto)


 


 10.0 x10^3/uL


(1.8-7.7)


 


Lymphocytes # (Auto)


 


 0.4 x10^3/uL


(1.0-4.8)


 


Monocytes # (Auto)


 


 0.3 x10^3/uL


(0.0-1.1)


 


Eosinophils # (Auto)


 


 0.0 x10^3/uL


(0.0-0.7)


 


Basophils # (Auto)


 


 0.0 x10^3/uL


(0.0-0.2)


 


Segmented Neutrophils %  90 % (35-66) 


 


Band Neutrophils %  6 % (0-9) 


 


Lymphocytes %  4 % (24-48) 


 


Platelet Estimate


 


 Adequate


(ADEQUATE)


 


Sodium Level


 


 137 mmol/L


(136-145)


 


Potassium Level


 


 4.8 mmol/L


(3.5-5.1)


 


Chloride Level


 


 105 mmol/L


()


 


Carbon Dioxide Level


 


 28 mmol/L


(21-32)


 


Anion Gap  4 (6-14) 


 


Blood Urea Nitrogen


 


 22 mg/dL


(8-26)


 


Creatinine


 


 0.7 mg/dL


(0.7-1.3)


 


Estimated GFR


(Cockcroft-Gault) 


 116.7 





 


BUN/Creatinine Ratio  31 (6-20) 


 


Glucose Level


 


 175 mg/dL


(70-99)


 


Calcium Level


 


 8.3 mg/dL


(8.5-10.1)


 


Total Bilirubin


 


 0.6 mg/dL


(0.2-1.0)


 


Aspartate Amino Transf


(AST/SGOT) 


 83 U/L (15-37) 





 


Alanine Aminotransferase


(ALT/SGPT) 


 254 U/L


(16-63)


 


Alkaline Phosphatase


 


 54 U/L


()


 


Total Protein


 


 5.9 g/dL


(6.4-8.2)


 


Albumin


 


 2.4 g/dL


(3.4-5.0)


 


Albumin/Globulin Ratio  0.7 (1.0-1.7) 











Objective:


Assessment:


 


1.  COVID-19 pneumonia.


2.  Acute hypoxic respiratory failure.


3.  Degenerative joint disease.


4.  Lymphopenia.


5.  Hypokalemia. Resolved


6.  Abnormal LFTs. Worsened





Plan:


Plan of Care


 


1.  Continue supportive care.


2.  Follow up labs and cultures.


3. s/p remdesivir.on  steroids per primary.


4.  Maintain aspiration precaution.


5.  We will sign off, call if any questions





 .  Discussed with nursing staff.











SHERLEY HASSAN MD            Aug 1, 2021 08:32

## 2021-08-01 NOTE — PDOC
[employer] PROGRESS NOTE


Date of Service


DOS:


DATE: 8/1/21 


TIME: 08:25





Chief Complaint


Chief Complaint


SEPSIS, was POA


severe malnutrition was POA, with obesity, BMI 32


Acute hypoxic respiratory failure


COVID-19 pneumonia requiring BiPAP


transaminitis, 





 


 iv fluid started





Continue IV Remdesivir


Continue IV thiamine and vitamin C


Continue IV steroids


Pending ferritin, LDH, CRP, D-dimer labs


Titrate O2 supplementation to maintain O2 saturation greater than 92%


Lovenox for DVT prophylaxis





History of Present Illness


History of Present Illness


56-year-old male -  transferred from [facility] yesterday with 

complaints of fever, headache, dry cough, shortness of breath for the past 3 

days.  Patient stated that he was actually exposed from his son he was worsening

and required to come to the ED at [facility] and he was found to be hypoxic and 

required 2 L of nasal cannula oxygen.  Chest x-ray over there showed bilateral 

mild bibasilar infiltrates.  Covid test was positive and patient was transferred

to [facility] for pulmonary evaluation and treatment.  Patient was started on 

Remdesivir and IV steroids.  Currently patient is requiring 15 L high flow nasal

cannula and IV steroids.





 8/1,.   IV fluid started yesterday


still weak, cough, 


poor po intake





Vitals/I&O


Vitals/I&O:





                                   Vital Signs








  Date Time  Temp Pulse Resp B/P (MAP) Pulse Ox O2 Delivery O2 Flow Rate FiO2


 


8/1/21 06:01     95 BiPAP/CPAP  


 


8/1/21 03:40 97.1 59 20 143/90 (107)    





 97.1       


 


7/31/21 21:53       80.0 














                                    I & O   


 


 7/31/21 7/31/21 8/1/21





 15:00 23:00 07:00


 


Intake Total 480 ml 1440 ml 0 ml


 


Output Total 700 ml 650 ml 650 ml


 


Balance -220 ml 790 ml -650 ml











Physical Exam


Physical Exam:


GEN axo3 male in some mild resp distres


LIMTIED COVID EXAM .


NEUROLOGIC:  Alert, oriented, grossly nonfocal.


PSYCHIATRIC:  Calm and cooperative.


General:  Alert, mild distress


Heart:  Regular rate (tele reg,  reviewed)


Extremities:  No clubbing





Labs


Labs:





Laboratory Tests








Test


 7/31/21


21:06 8/1/21


04:00


 


Glucose (Fingerstick)


 156 mg/dL


(70-99) 





 


White Blood Count


 


 10.9 x10^3/uL


(4.0-11.0)


 


Red Blood Count


 


 4.80 x10^6/uL


(4.30-5.70)


 


Hemoglobin


 


 15.4 g/dL


(13.0-17.5)


 


Hematocrit


 


 45.5 %


(39.0-53.0)


 


Mean Corpuscular Volume  95 fL () 


 


Mean Corpuscular Hemoglobin  32 pg (25-35) 


 


Mean Corpuscular Hemoglobin


Concent 


 34 g/dL


(31-37)


 


Red Cell Distribution Width


 


 13.0 %


(11.5-14.5)


 


Platelet Count


 


 262 x10^3/uL


(140-400)


 


Neutrophils (%) (Auto)  93 % (31-73) 


 


Lymphocytes (%) (Auto)  4 % (24-48) 


 


Monocytes (%) (Auto)  3 % (0-9) 


 


Eosinophils (%) (Auto)  0 % (0-3) 


 


Basophils (%) (Auto)  0 % (0-3) 


 


Neutrophils # (Auto)


 


 10.0 x10^3/uL


(1.8-7.7)


 


Lymphocytes # (Auto)


 


 0.4 x10^3/uL


(1.0-4.8)


 


Monocytes # (Auto)


 


 0.3 x10^3/uL


(0.0-1.1)


 


Eosinophils # (Auto)


 


 0.0 x10^3/uL


(0.0-0.7)


 


Basophils # (Auto)


 


 0.0 x10^3/uL


(0.0-0.2)


 


Segmented Neutrophils %  90 % (35-66) 


 


Band Neutrophils %  6 % (0-9) 


 


Lymphocytes %  4 % (24-48) 


 


Platelet Estimate


 


 Adequate


(ADEQUATE)


 


Sodium Level


 


 137 mmol/L


(136-145)


 


Potassium Level


 


 4.8 mmol/L


(3.5-5.1)


 


Chloride Level


 


 105 mmol/L


()


 


Carbon Dioxide Level


 


 28 mmol/L


(21-32)


 


Anion Gap  4 (6-14) 


 


Blood Urea Nitrogen


 


 22 mg/dL


(8-26)


 


Creatinine


 


 0.7 mg/dL


(0.7-1.3)


 


Estimated GFR


(Cockcroft-Gault) 


 116.7 





 


BUN/Creatinine Ratio  31 (6-20) 


 


Glucose Level


 


 175 mg/dL


(70-99)


 


Calcium Level


 


 8.3 mg/dL


(8.5-10.1)


 


Total Bilirubin


 


 0.6 mg/dL


(0.2-1.0)


 


Aspartate Amino Transf


(AST/SGOT) 


 83 U/L (15-37) 





 


Alanine Aminotransferase


(ALT/SGPT) 


 254 U/L


(16-63)


 


Alkaline Phosphatase


 


 54 U/L


()


 


Total Protein


 


 5.9 g/dL


(6.4-8.2)


 


Albumin


 


 2.4 g/dL


(3.4-5.0)


 


Albumin/Globulin Ratio  0.7 (1.0-1.7) 











Comment


Review of Relevant


I have reviewed the following items genesis (where applicable) has been applied.


Medications:





Current Medications








 Medications


  (Trade)  Dose


 Ordered  Sig/Shayy


 Route


 PRN Reason  Start Time


 Stop Time Status Last Admin


Dose Admin


 


 Potassium


 Chloride/Dextrose/


 Sod Cl  1,000 ml @ 


 100 mls/hr  Q10H


 IV


   7/31/21 10:15


    7/31/21 21:21














Justifications for Admission


Other Justification














DUNIA SHI MD                  Aug 1, 2021 08:27

## 2021-08-01 NOTE — PDOC
PULMONARY PROGRESS NOTES


DATE: 8/1/21 


TIME: 09:17


Subjective


No new symptoms, feels better, slept okay





75% BiPAP


Vitals





Vital Signs








  Date Time  Temp Pulse Resp B/P (MAP) Pulse Ox O2 Delivery O2 Flow Rate FiO2


 


8/1/21 07:00 96.3 68 22 147/88 (107) 93 BiPAP/CPAP  





 96.3       


 


7/31/21 21:53       80.0 








Comments


Pt. seen during covid -19 pandemic visual exam preformed 


RRR


BIPAP


no distress 


no obvious rash or edema


Labs





Laboratory Tests








Test


 7/31/21


21:06 8/1/21


04:00 8/1/21


08:30


 


Glucose (Fingerstick)


 156 mg/dL


(70-99) 


 141 mg/dL


(70-99)


 


White Blood Count


 


 10.9 x10^3/uL


(4.0-11.0) 





 


Red Blood Count


 


 4.80 x10^6/uL


(4.30-5.70) 





 


Hemoglobin


 


 15.4 g/dL


(13.0-17.5) 





 


Hematocrit


 


 45.5 %


(39.0-53.0) 





 


Mean Corpuscular Volume  95 fL ()  


 


Mean Corpuscular Hemoglobin  32 pg (25-35)  


 


Mean Corpuscular Hemoglobin


Concent 


 34 g/dL


(31-37) 





 


Red Cell Distribution Width


 


 13.0 %


(11.5-14.5) 





 


Platelet Count


 


 262 x10^3/uL


(140-400) 





 


Neutrophils (%) (Auto)  93 % (31-73)  


 


Lymphocytes (%) (Auto)  4 % (24-48)  


 


Monocytes (%) (Auto)  3 % (0-9)  


 


Eosinophils (%) (Auto)  0 % (0-3)  


 


Basophils (%) (Auto)  0 % (0-3)  


 


Neutrophils # (Auto)


 


 10.0 x10^3/uL


(1.8-7.7) 





 


Lymphocytes # (Auto)


 


 0.4 x10^3/uL


(1.0-4.8) 





 


Monocytes # (Auto)


 


 0.3 x10^3/uL


(0.0-1.1) 





 


Eosinophils # (Auto)


 


 0.0 x10^3/uL


(0.0-0.7) 





 


Basophils # (Auto)


 


 0.0 x10^3/uL


(0.0-0.2) 





 


Segmented Neutrophils %  90 % (35-66)  


 


Band Neutrophils %  6 % (0-9)  


 


Lymphocytes %  4 % (24-48)  


 


Platelet Estimate


 


 Adequate


(ADEQUATE) 





 


Sodium Level


 


 137 mmol/L


(136-145) 





 


Potassium Level


 


 4.8 mmol/L


(3.5-5.1) 





 


Chloride Level


 


 105 mmol/L


() 





 


Carbon Dioxide Level


 


 28 mmol/L


(21-32) 





 


Anion Gap  4 (6-14)  


 


Blood Urea Nitrogen


 


 22 mg/dL


(8-26) 





 


Creatinine


 


 0.7 mg/dL


(0.7-1.3) 





 


Estimated GFR


(Cockcroft-Gault) 


 116.7 


 





 


BUN/Creatinine Ratio  31 (6-20)  


 


Glucose Level


 


 175 mg/dL


(70-99) 





 


Calcium Level


 


 8.3 mg/dL


(8.5-10.1) 





 


Total Bilirubin


 


 0.6 mg/dL


(0.2-1.0) 





 


Aspartate Amino Transf


(AST/SGOT) 


 83 U/L (15-37) 


 





 


Alanine Aminotransferase


(ALT/SGPT) 


 254 U/L


(16-63) 





 


Alkaline Phosphatase


 


 54 U/L


() 





 


Total Protein


 


 5.9 g/dL


(6.4-8.2) 





 


Albumin


 


 2.4 g/dL


(3.4-5.0) 





 


Albumin/Globulin Ratio  0.7 (1.0-1.7)  








Laboratory Tests








Test


 7/31/21


21:06 8/1/21


04:00 8/1/21


08:30


 


Glucose (Fingerstick)


 156 mg/dL


(70-99) 


 141 mg/dL


(70-99)


 


White Blood Count


 


 10.9 x10^3/uL


(4.0-11.0) 





 


Red Blood Count


 


 4.80 x10^6/uL


(4.30-5.70) 





 


Hemoglobin


 


 15.4 g/dL


(13.0-17.5) 





 


Hematocrit


 


 45.5 %


(39.0-53.0) 





 


Mean Corpuscular Volume  95 fL ()  


 


Mean Corpuscular Hemoglobin  32 pg (25-35)  


 


Mean Corpuscular Hemoglobin


Concent 


 34 g/dL


(31-37) 





 


Red Cell Distribution Width


 


 13.0 %


(11.5-14.5) 





 


Platelet Count


 


 262 x10^3/uL


(140-400) 





 


Neutrophils (%) (Auto)  93 % (31-73)  


 


Lymphocytes (%) (Auto)  4 % (24-48)  


 


Monocytes (%) (Auto)  3 % (0-9)  


 


Eosinophils (%) (Auto)  0 % (0-3)  


 


Basophils (%) (Auto)  0 % (0-3)  


 


Neutrophils # (Auto)


 


 10.0 x10^3/uL


(1.8-7.7) 





 


Lymphocytes # (Auto)


 


 0.4 x10^3/uL


(1.0-4.8) 





 


Monocytes # (Auto)


 


 0.3 x10^3/uL


(0.0-1.1) 





 


Eosinophils # (Auto)


 


 0.0 x10^3/uL


(0.0-0.7) 





 


Basophils # (Auto)


 


 0.0 x10^3/uL


(0.0-0.2) 





 


Segmented Neutrophils %  90 % (35-66)  


 


Band Neutrophils %  6 % (0-9)  


 


Lymphocytes %  4 % (24-48)  


 


Platelet Estimate


 


 Adequate


(ADEQUATE) 





 


Sodium Level


 


 137 mmol/L


(136-145) 





 


Potassium Level


 


 4.8 mmol/L


(3.5-5.1) 





 


Chloride Level


 


 105 mmol/L


() 





 


Carbon Dioxide Level


 


 28 mmol/L


(21-32) 





 


Anion Gap  4 (6-14)  


 


Blood Urea Nitrogen


 


 22 mg/dL


(8-26) 





 


Creatinine


 


 0.7 mg/dL


(0.7-1.3) 





 


Estimated GFR


(Cockcroft-Gault) 


 116.7 


 





 


BUN/Creatinine Ratio  31 (6-20)  


 


Glucose Level


 


 175 mg/dL


(70-99) 





 


Calcium Level


 


 8.3 mg/dL


(8.5-10.1) 





 


Total Bilirubin


 


 0.6 mg/dL


(0.2-1.0) 





 


Aspartate Amino Transf


(AST/SGOT) 


 83 U/L (15-37) 


 





 


Alanine Aminotransferase


(ALT/SGPT) 


 254 U/L


(16-63) 





 


Alkaline Phosphatase


 


 54 U/L


() 





 


Total Protein


 


 5.9 g/dL


(6.4-8.2) 





 


Albumin


 


 2.4 g/dL


(3.4-5.0) 





 


Albumin/Globulin Ratio  0.7 (1.0-1.7)  








Medications





Active Scripts








 Medications  Dose


 Route/Sig


 Max Daily Dose Days Date Category Dose


Instructions


 


 Morphine Sulfate


 2 Mg/1 Ml


 Cartridge  2 Mg


 IV PRN Q4HRS PRN


   7/27/21 Reported 


 


 Zinc Sulfate 50


 Mg Tablet  220 Mg


 PO DAILY


   7/27/21 Reported 


 


 Methylprednisolone


 Sod Succ 125 Mg


 Vial  60 Mg


 IJ Q8HRS


   7/27/21 Reported 


 


 Combivent


 Respimat Inhal


  (Ipratropium/Albuterol


 Sulfate) 4 Gm


 Aer.w.adap  2 Inh


 IH QID


   7/27/21 Reported 


 


 Pepcid


  (Famotidine) 20


 Mg Tablet  20 Mg


 PO HS


   7/27/21 Reported 


 


 Lovenox


  (Enoxaparin


 Sodium) 30 Mg/0.3


 Ml Disp.syrin  30 Mg


 SQ DAILY


   7/27/21 Reported 


 


 Vitamin D3 **


  (Vitamin D) 125


 Mcg Capsule  125 Mcg


 PO DAILY


   7/27/21 Reported  5,000 UNITS = 125 MCG


 


 Zyrtec


  (Cetirizine Hcl)


 10 Mg Tablet  1 Tab


 PO DAILY


   7/27/21 Reported 


 


 Ascorbic Acid 500


 Mg Tablet  2 Mg


 PO DAILY


   7/27/21 Reported 


 


 Acetaminophen 325


 Mg Tablet  2 Tab


 PO PRN Q6HRS PRN


  30 7/27/21 Reported 


 


 No Known


 Medications Prior


 To Admisstion


  (Info)  Each  1 Each


 


   8/25/20 Reported 











Impression


.


IMPRESSION:


1.  Acute hypoxemic respiratory failure secondary to COVID-19 viral pneumonia.


2.  COVID-19 viral pneumonia.


3.  Abnormal chest x-ray, compatible with viral pneumonia.


4.  Nonspecific anxiety, possible clinical depression





Plan


.


Updated 8/1


Continue BiPAP at 75%, decrease FiO2 as tolerated


Stressed the importance of sitting up in a chair,


Continue remdesivir, steroids,


As needed Haldol for anxiety


Discussed case with wife per telephone, updated her, answered all questions.











Updated 7/31


Patient continues to do well with BiPAP


High flow oxygen when eating


Remdesivir


Steroids


As needed Haldol, for anxiety











JUDY COLEMAN MD               Aug 1, 2021 09:21

## 2021-08-01 NOTE — NUR
Patient refusing to continue to have IV fluids running it is keeping him awake with the 
beeping. Educated patient why he is receiving fluids. He still does not want it. Patient is 
eating and drinking.

## 2021-08-02 VITALS — SYSTOLIC BLOOD PRESSURE: 160 MMHG | DIASTOLIC BLOOD PRESSURE: 87 MMHG

## 2021-08-02 VITALS — SYSTOLIC BLOOD PRESSURE: 147 MMHG | DIASTOLIC BLOOD PRESSURE: 81 MMHG

## 2021-08-02 VITALS — SYSTOLIC BLOOD PRESSURE: 141 MMHG | DIASTOLIC BLOOD PRESSURE: 88 MMHG

## 2021-08-02 VITALS — SYSTOLIC BLOOD PRESSURE: 162 MMHG | DIASTOLIC BLOOD PRESSURE: 104 MMHG

## 2021-08-02 VITALS — DIASTOLIC BLOOD PRESSURE: 85 MMHG | SYSTOLIC BLOOD PRESSURE: 138 MMHG

## 2021-08-02 VITALS — SYSTOLIC BLOOD PRESSURE: 148 MMHG | DIASTOLIC BLOOD PRESSURE: 82 MMHG

## 2021-08-02 LAB
ALBUMIN SERPL-MCNC: 2.7 G/DL (ref 3.4–5)
ALBUMIN/GLOB SERPL: 0.7 {RATIO} (ref 1–1.7)
ALP SERPL-CCNC: 60 U/L (ref 46–116)
ALT SERPL-CCNC: 190 U/L (ref 16–63)
ANION GAP SERPL CALC-SCNC: 4 MMOL/L (ref 6–14)
AST SERPL-CCNC: 48 U/L (ref 15–37)
BASOPHILS # BLD AUTO: 0 X10^3/UL (ref 0–0.2)
BASOPHILS NFR BLD: 0 % (ref 0–3)
BILIRUB SERPL-MCNC: 1 MG/DL (ref 0.2–1)
BUN SERPL-MCNC: 23 MG/DL (ref 8–26)
BUN/CREAT SERPL: 26 (ref 6–20)
CALCIUM SERPL-MCNC: 9 MG/DL (ref 8.5–10.1)
CHLORIDE SERPL-SCNC: 100 MMOL/L (ref 98–107)
CO2 SERPL-SCNC: 35 MMOL/L (ref 21–32)
CREAT SERPL-MCNC: 0.9 MG/DL (ref 0.7–1.3)
EOSINOPHIL NFR BLD: 0 % (ref 0–3)
EOSINOPHIL NFR BLD: 0 X10^3/UL (ref 0–0.7)
ERYTHROCYTE [DISTWIDTH] IN BLOOD BY AUTOMATED COUNT: 13 % (ref 11.5–14.5)
GFR SERPLBLD BASED ON 1.73 SQ M-ARVRAT: 87.3 ML/MIN
GLUCOSE SERPL-MCNC: 132 MG/DL (ref 70–99)
HCT VFR BLD CALC: 48.6 % (ref 39–53)
HGB BLD-MCNC: 16.4 G/DL (ref 13–17.5)
LYMPHOCYTES # BLD: 0.5 X10^3/UL (ref 1–4.8)
LYMPHOCYTES NFR BLD AUTO: 4 % (ref 24–48)
MCH RBC QN AUTO: 32 PG (ref 25–35)
MCHC RBC AUTO-ENTMCNC: 34 G/DL (ref 31–37)
MCV RBC AUTO: 94 FL (ref 79–100)
MONO #: 0.4 X10^3/UL (ref 0–1.1)
MONOCYTES NFR BLD: 3 % (ref 0–9)
NEUT #: 12.5 X10^3/UL (ref 1.8–7.7)
NEUTROPHILS NFR BLD AUTO: 93 % (ref 31–73)
PLATELET # BLD AUTO: 220 X10^3/UL (ref 140–400)
POTASSIUM SERPL-SCNC: 4.8 MMOL/L (ref 3.5–5.1)
PROT SERPL-MCNC: 6.7 G/DL (ref 6.4–8.2)
RBC # BLD AUTO: 5.18 X10^6/UL (ref 4.3–5.7)
SODIUM SERPL-SCNC: 139 MMOL/L (ref 136–145)
WBC # BLD AUTO: 13.5 X10^3/UL (ref 4–11)

## 2021-08-02 RX ADMIN — CHOLECALCIFEROL CAP 125 MCG (5000 UNIT) SCH UNIT: 125 CAP at 10:11

## 2021-08-02 RX ADMIN — CETIRIZINE HYDROCHLORIDE SCH MG: 10 TABLET, FILM COATED ORAL at 10:11

## 2021-08-02 RX ADMIN — Medication SCH MG: at 10:11

## 2021-08-02 RX ADMIN — ENOXAPARIN SODIUM SCH MG: 40 INJECTION SUBCUTANEOUS at 20:33

## 2021-08-02 RX ADMIN — METHYLPREDNISOLONE SODIUM SUCCINATE SCH MG: 125 INJECTION, POWDER, FOR SOLUTION INTRAMUSCULAR; INTRAVENOUS at 05:59

## 2021-08-02 RX ADMIN — ZINC SULFATE CAP 220 MG (50 MG ELEMENTAL ZN) SCH MG: 220 (50 ZN) CAP at 10:11

## 2021-08-02 RX ADMIN — ACETAMINOPHEN PRN MG: 325 TABLET, FILM COATED ORAL at 20:46

## 2021-08-02 RX ADMIN — IPRATROPIUM BROMIDE AND ALBUTEROL SCH PUFF: 20; 100 SPRAY, METERED RESPIRATORY (INHALATION) at 13:41

## 2021-08-02 RX ADMIN — METHYLPREDNISOLONE SODIUM SUCCINATE SCH MG: 125 INJECTION, POWDER, FOR SOLUTION INTRAMUSCULAR; INTRAVENOUS at 20:34

## 2021-08-02 RX ADMIN — METHYLPREDNISOLONE SODIUM SUCCINATE SCH MG: 125 INJECTION, POWDER, FOR SOLUTION INTRAMUSCULAR; INTRAVENOUS at 13:41

## 2021-08-02 RX ADMIN — IPRATROPIUM BROMIDE AND ALBUTEROL SCH PUFF: 20; 100 SPRAY, METERED RESPIRATORY (INHALATION) at 10:11

## 2021-08-02 RX ADMIN — FAMOTIDINE SCH MG: 20 TABLET ORAL at 20:33

## 2021-08-02 RX ADMIN — IPRATROPIUM BROMIDE AND ALBUTEROL SCH PUFF: 20; 100 SPRAY, METERED RESPIRATORY (INHALATION) at 16:37

## 2021-08-02 RX ADMIN — MORPHINE SULFATE PRN MG: 2 INJECTION, SOLUTION INTRAMUSCULAR; INTRAVENOUS at 20:35

## 2021-08-02 RX ADMIN — IPRATROPIUM BROMIDE AND ALBUTEROL SCH PUFF: 20; 100 SPRAY, METERED RESPIRATORY (INHALATION) at 20:00

## 2021-08-02 NOTE — PDOC
Infectious Disease Note


Subjective


Subjective


Patient on BiPAP


Frustrated as he is not able to go home


Remdesivir discontinued yesterday


Discussed with RN





Vital Sign


Vital Signs





Vital Signs








  Date Time  Temp Pulse Resp B/P (MAP) Pulse Ox O2 Delivery O2 Flow Rate FiO2


 


8/2/21 07:00 98.6 70 28 148/82 (104) 91 BiPAP/CPAP  





 98.6       


 


8/1/21 21:46       15.0 











Physical Exam


PHYSICAL EXAM








GEN axo3 male on BIPAP


HEENT:  Normocephalic, atraumatic.  Anicteric. 


NECK:  Supple, no JVD.


LUNGS:  Crackles bilaterally.   


HEART:  S1, S2.  


ABDOMEN:  Soft, nontender, nondistended.  Bowel sounds present.


EXTREMITIES:  No edema, no cyanosis.


DERMATOLOGIC:  Warm, dry, no generalized rash.


NEUROLOGIC:  Alert, oriented, grossly nonfocal.


PSYCHIATRIC:  Calm and cooperative. 


 PIV looks clean.





Labs


Lab





Laboratory Tests








Test


 8/1/21


11:39 8/2/21


09:00


 


Glucose (Fingerstick)


 137 mg/dL


(70-99) 





 


White Blood Count


 


 13.5 x10^3/uL


(4.0-11.0)


 


Red Blood Count


 


 5.18 x10^6/uL


(4.30-5.70)


 


Hemoglobin


 


 16.4 g/dL


(13.0-17.5)


 


Hematocrit


 


 48.6 %


(39.0-53.0)


 


Mean Corpuscular Volume  94 fL () 


 


Mean Corpuscular Hemoglobin  32 pg (25-35) 


 


Mean Corpuscular Hemoglobin


Concent 


 34 g/dL


(31-37)


 


Red Cell Distribution Width


 


 13.0 %


(11.5-14.5)


 


Platelet Count


 


 220 x10^3/uL


(140-400)


 


Neutrophils (%) (Auto)  93 % (31-73) 


 


Lymphocytes (%) (Auto)  4 % (24-48) 


 


Monocytes (%) (Auto)  3 % (0-9) 


 


Eosinophils (%) (Auto)  0 % (0-3) 


 


Basophils (%) (Auto)  0 % (0-3) 


 


Neutrophils # (Auto)


 


 12.5 x10^3/uL


(1.8-7.7)


 


Lymphocytes # (Auto)


 


 0.5 x10^3/uL


(1.0-4.8)


 


Monocytes # (Auto)


 


 0.4 x10^3/uL


(0.0-1.1)


 


Eosinophils # (Auto)


 


 0.0 x10^3/uL


(0.0-0.7)


 


Basophils # (Auto)


 


 0.0 x10^3/uL


(0.0-0.2)


 


Sodium Level


 


 139 mmol/L


(136-145)


 


Potassium Level


 


 4.8 mmol/L


(3.5-5.1)


 


Chloride Level


 


 100 mmol/L


()


 


Carbon Dioxide Level


 


 35 mmol/L


(21-32)


 


Anion Gap  4 (6-14) 


 


Blood Urea Nitrogen


 


 23 mg/dL


(8-26)


 


Creatinine


 


 0.9 mg/dL


(0.7-1.3)


 


Estimated GFR


(Cockcroft-Gault) 


 87.3 





 


BUN/Creatinine Ratio  26 (6-20) 


 


Glucose Level


 


 132 mg/dL


(70-99)


 


Calcium Level


 


 9.0 mg/dL


(8.5-10.1)


 


Total Bilirubin


 


 1.0 mg/dL


(0.2-1.0)


 


Aspartate Amino Transf


(AST/SGOT) 


 48 U/L (15-37) 





 


Alanine Aminotransferase


(ALT/SGPT) 


 190 U/L


(16-63)


 


Alkaline Phosphatase


 


 60 U/L


()


 


Total Protein


 


 6.7 g/dL


(6.4-8.2)


 


Albumin


 


 2.7 g/dL


(3.4-5.0)


 


Albumin/Globulin Ratio  0.7 (1.0-1.7) 











Objective


Assessment


 


1.  COVID-19 pneumonia.


2.  Acute hypoxic respiratory failure.


3.  Degenerative joint disease.


4.  Lymphopenia.


5.  Hypokalemia. Resolved


6.  Abnormal LFTs. Worsened





Plan


Plan of Care


 


1.  Continue supportive care.


2.  Follow up labs and cultures.


3. s/p remdesivir.on  steroids per primary.


4.  Maintain aspiration precaution.


5.  We will sign off, call if any questions





 .  Discussed with nursing staff.











DANAY HASSAN MD                Aug 2, 2021 11:01

## 2021-08-02 NOTE — PDOC
PULMONARY PROGRESS NOTES


DATE: 8/2/21 


TIME: 10:00


Subjective


remains on Bipap at 75%


hypoxia when off BIPAP


frustrated with current illness 


no increased SOA or cough


Vitals





Vital Signs








  Date Time  Temp Pulse Resp B/P (MAP) Pulse Ox O2 Delivery O2 Flow Rate FiO2


 


8/2/21 07:00 98.6 70 28 148/82 (104) 91 BiPAP/CPAP  





 98.6       


 


8/1/21 21:46       15.0 








Comments


Pt. seen during covid -19 pandemic visual exam preformed 


RRR


BIPAP


no distress 


no obvious rash or edema


Labs





Laboratory Tests








Test


 7/31/21


21:06 8/1/21


04:00 8/1/21


08:30 8/1/21


11:39


 


Glucose (Fingerstick)


 156 mg/dL


(70-99) 


 141 mg/dL


(70-99) 137 mg/dL


(70-99)


 


White Blood Count


 


 10.9 x10^3/uL


(4.0-11.0) 


 





 


Red Blood Count


 


 4.80 x10^6/uL


(4.30-5.70) 


 





 


Hemoglobin


 


 15.4 g/dL


(13.0-17.5) 


 





 


Hematocrit


 


 45.5 %


(39.0-53.0) 


 





 


Mean Corpuscular Volume  95 fL ()   


 


Mean Corpuscular Hemoglobin  32 pg (25-35)   


 


Mean Corpuscular Hemoglobin


Concent 


 34 g/dL


(31-37) 


 





 


Red Cell Distribution Width


 


 13.0 %


(11.5-14.5) 


 





 


Platelet Count


 


 262 x10^3/uL


(140-400) 


 





 


Neutrophils (%) (Auto)  93 % (31-73)   


 


Lymphocytes (%) (Auto)  4 % (24-48)   


 


Monocytes (%) (Auto)  3 % (0-9)   


 


Eosinophils (%) (Auto)  0 % (0-3)   


 


Basophils (%) (Auto)  0 % (0-3)   


 


Neutrophils # (Auto)


 


 10.0 x10^3/uL


(1.8-7.7) 


 





 


Lymphocytes # (Auto)


 


 0.4 x10^3/uL


(1.0-4.8) 


 





 


Monocytes # (Auto)


 


 0.3 x10^3/uL


(0.0-1.1) 


 





 


Eosinophils # (Auto)


 


 0.0 x10^3/uL


(0.0-0.7) 


 





 


Basophils # (Auto)


 


 0.0 x10^3/uL


(0.0-0.2) 


 





 


Segmented Neutrophils %  90 % (35-66)   


 


Band Neutrophils %  6 % (0-9)   


 


Lymphocytes %  4 % (24-48)   


 


Platelet Estimate


 


 Adequate


(ADEQUATE) 


 





 


Sodium Level


 


 137 mmol/L


(136-145) 


 





 


Potassium Level


 


 4.8 mmol/L


(3.5-5.1) 


 





 


Chloride Level


 


 105 mmol/L


() 


 





 


Carbon Dioxide Level


 


 28 mmol/L


(21-32) 


 





 


Anion Gap  4 (6-14)   


 


Blood Urea Nitrogen


 


 22 mg/dL


(8-26) 


 





 


Creatinine


 


 0.7 mg/dL


(0.7-1.3) 


 





 


Estimated GFR


(Cockcroft-Gault) 


 116.7 


 


 





 


BUN/Creatinine Ratio  31 (6-20)   


 


Glucose Level


 


 175 mg/dL


(70-99) 


 





 


Calcium Level


 


 8.3 mg/dL


(8.5-10.1) 


 





 


Total Bilirubin


 


 0.6 mg/dL


(0.2-1.0) 


 





 


Aspartate Amino Transf


(AST/SGOT) 


 83 U/L (15-37) 


 


 





 


Alanine Aminotransferase


(ALT/SGPT) 


 254 U/L


(16-63) 


 





 


Alkaline Phosphatase


 


 54 U/L


() 


 





 


Total Protein


 


 5.9 g/dL


(6.4-8.2) 


 





 


Albumin


 


 2.4 g/dL


(3.4-5.0) 


 





 


Albumin/Globulin Ratio  0.7 (1.0-1.7)   


 


Test


 8/2/21


09:00 


 


 





 


White Blood Count


 13.5 x10^3/uL


(4.0-11.0) 


 


 





 


Red Blood Count


 5.18 x10^6/uL


(4.30-5.70) 


 


 





 


Hemoglobin


 16.4 g/dL


(13.0-17.5) 


 


 





 


Hematocrit


 48.6 %


(39.0-53.0) 


 


 





 


Mean Corpuscular Volume 94 fL ()    


 


Mean Corpuscular Hemoglobin 32 pg (25-35)    


 


Mean Corpuscular Hemoglobin


Concent 34 g/dL


(31-37) 


 


 





 


Red Cell Distribution Width


 13.0 %


(11.5-14.5) 


 


 





 


Platelet Count


 220 x10^3/uL


(140-400) 


 


 





 


Neutrophils (%) (Auto) 93 % (31-73)    


 


Lymphocytes (%) (Auto) 4 % (24-48)    


 


Monocytes (%) (Auto) 3 % (0-9)    


 


Eosinophils (%) (Auto) 0 % (0-3)    


 


Basophils (%) (Auto) 0 % (0-3)    


 


Neutrophils # (Auto)


 12.5 x10^3/uL


(1.8-7.7) 


 


 





 


Lymphocytes # (Auto)


 0.5 x10^3/uL


(1.0-4.8) 


 


 





 


Monocytes # (Auto)


 0.4 x10^3/uL


(0.0-1.1) 


 


 





 


Eosinophils # (Auto)


 0.0 x10^3/uL


(0.0-0.7) 


 


 





 


Basophils # (Auto)


 0.0 x10^3/uL


(0.0-0.2) 


 


 











Laboratory Tests








Test


 8/1/21


11:39 8/2/21


09:00


 


Glucose (Fingerstick)


 137 mg/dL


(70-99) 





 


White Blood Count


 


 13.5 x10^3/uL


(4.0-11.0)


 


Red Blood Count


 


 5.18 x10^6/uL


(4.30-5.70)


 


Hemoglobin


 


 16.4 g/dL


(13.0-17.5)


 


Hematocrit


 


 48.6 %


(39.0-53.0)


 


Mean Corpuscular Volume  94 fL () 


 


Mean Corpuscular Hemoglobin  32 pg (25-35) 


 


Mean Corpuscular Hemoglobin


Concent 


 34 g/dL


(31-37)


 


Red Cell Distribution Width


 


 13.0 %


(11.5-14.5)


 


Platelet Count


 


 220 x10^3/uL


(140-400)


 


Neutrophils (%) (Auto)  93 % (31-73) 


 


Lymphocytes (%) (Auto)  4 % (24-48) 


 


Monocytes (%) (Auto)  3 % (0-9) 


 


Eosinophils (%) (Auto)  0 % (0-3) 


 


Basophils (%) (Auto)  0 % (0-3) 


 


Neutrophils # (Auto)


 


 12.5 x10^3/uL


(1.8-7.7)


 


Lymphocytes # (Auto)


 


 0.5 x10^3/uL


(1.0-4.8)


 


Monocytes # (Auto)


 


 0.4 x10^3/uL


(0.0-1.1)


 


Eosinophils # (Auto)


 


 0.0 x10^3/uL


(0.0-0.7)


 


Basophils # (Auto)


 


 0.0 x10^3/uL


(0.0-0.2)








Medications





Active Scripts








 Medications  Dose


 Route/Sig


 Max Daily Dose Days Date Category Dose


Instructions


 


 Morphine Sulfate


 2 Mg/1 Ml


 Cartridge  2 Mg


 IV PRN Q4HRS PRN


   7/27/21 Reported 


 


 Zinc Sulfate 50


 Mg Tablet  220 Mg


 PO DAILY


   7/27/21 Reported 


 


 Methylprednisolone


 Sod Succ 125 Mg


 Vial  60 Mg


 IJ Q8HRS


   7/27/21 Reported 


 


 Combivent


 Respimat Inhal


  (Ipratropium/Albuterol


 Sulfate) 4 Gm


 Aer.w.adap  2 Inh


 IH QID


   7/27/21 Reported 


 


 Pepcid


  (Famotidine) 20


 Mg Tablet  20 Mg


 PO HS


   7/27/21 Reported 


 


 Lovenox


  (Enoxaparin


 Sodium) 30 Mg/0.3


 Ml Disp.syrin  30 Mg


 SQ DAILY


   7/27/21 Reported 


 


 Vitamin D3 **


  (Vitamin D) 125


 Mcg Capsule  125 Mcg


 PO DAILY


   7/27/21 Reported  5,000 UNITS = 125 MCG


 


 Zyrtec


  (Cetirizine Hcl)


 10 Mg Tablet  1 Tab


 PO DAILY


   7/27/21 Reported 


 


 Ascorbic Acid 500


 Mg Tablet  2 Mg


 PO DAILY


   7/27/21 Reported 


 


 Acetaminophen 325


 Mg Tablet  2 Tab


 PO PRN Q6HRS PRN


  30 7/27/21 Reported 


 


 No Known


 Medications Prior


 To Admisstion


  (Info)  Each  1 Each


 


   8/25/20 Reported 











Impression


.


IMPRESSION:


1.  Acute hypoxemic respiratory failure secondary to COVID-19 viral pneumonia.


2.  COVID-19 viral pneumonia.


3.  Abnormal chest x-ray, compatible with viral pneumonia.


4.  Nonspecific anxiety, possible clinical depression





Plan


.


Updated 8/2/21


Continue supplemental oxygen to keep sats above 92% on BIPAP 75%, monitor 

respiratory status closely


S/P remdesivir


Continue steroids 


Off ABX and ID has signed off


PT/OT


DVT/GI PPX :lovenox 


D/W RN and RT








Updated 8/1


Continue BiPAP at 75%, decrease FiO2 as tolerated


Stressed the importance of sitting up in a chair,


Continue remdesivir, steroids,


As needed Haldol for anxiety


Discussed case with wife per telephone, updated her, answered all questions.











Updated 7/31


Patient continues to do well with BiPAP


High flow oxygen when eating


Remdesivir


Steroids


As needed Haldol, for anxiety











JUDY COLEMAN MD               Aug 2, 2021 10:00

## 2021-08-02 NOTE — PDOC
[employer] PROGRESS NOTE


Date of Service


DOS:


DATE: 8/2/21 


TIME: 12:15





Chief Complaint


Chief Complaint


SEPSIS, was POA


severe malnutrition was POA, with obesity, BMI 32


Acute hypoxic respiratory failure


COVID-19 pneumonia requiring BiPAP


transaminitis, 





 


 iv fluid started





Continue IV Remdesivir


Continue IV thiamine and vitamin C


Continue IV steroids


Pending ferritin, LDH, CRP, D-dimer labs


Titrate O2 supplementation to maintain O2 saturation greater than 92%


Lovenox for DVT prophylaxis





History of Present Illness


History of Present Illness


Mr Read is a 56-year-old male with no significant past medical history except 

for degenerative joint disease who was transferred from [facility] 

yesterday with complaints of fever, headache, dry cough, shortness of breath for

the past 3 days.  Patient stated that he was actually exposed from his son he 

was worsening and required to come to the ED at [facility] and he was found to 

be hypoxic and required 2 L of nasal cannula oxygen.  Chest x-ray over there 

showed bilateral mild bibasilar infiltrates.  Covid test was positive and 

patient was transferred to [facility] for pulmonary evaluation and treatment.  Patient 

was started on Remdesivir and IV steroids.  Currently patient is requiring 15 L 

high flow nasal cannula and IV steroids.





7/30/2021


No acute events overnight.  Patient was saturating 92% on 15 L nasal cannula and

he had desatted down to 70% after he had to blow his nose.  Patient was placed 

on BiPAP at 100% FiO2.  Chest leg appears to be worsening.  Patient's chart, 

labs, images were reviewed and discussed with RN


8/1:  poor Po intake, dry,   dark urine





Desaturates to 72% when he takes off his BiPAP.  Advised to get back on BIPAP 

after eating. he is able to place himself back on. Frustrated with the severity 

of his hypoxia.





Vitals/I&O


Vitals/I&O:





                                   Vital Signs








  Date Time  Temp Pulse Resp B/P (MAP) Pulse Ox O2 Delivery O2 Flow Rate FiO2


 


8/2/21 11:00 97.1 70 31 141/88 (105) 95 BiPAP/CPAP  





 97.1       


 


8/1/21 21:46       15.0 














                                    I & O   


 


 8/1/21 8/1/21 8/2/21





 15:00 23:00 07:00


 


Intake Total 210 ml 420 ml 300 ml


 


Output Total 700 ml 1000 ml 700 ml


 


Balance -490 ml -580 ml -400 ml











Physical Exam


Physical Exam:








GEN axo3 male on BIPAP


HEENT:  Normocephalic, atraumatic.  Anicteric. 


NECK:  Supple, no JVD.


LUNGS:  Crackles bilaterally.   


HEART:  S1, S2.  


ABDOMEN:  Soft, nontender, nondistended.  Bowel sounds present.


EXTREMITIES:  No edema, no cyanosis.


DERMATOLOGIC:  Warm, dry, no generalized rash.


NEUROLOGIC:  Alert, oriented, grossly nonfocal.


PSYCHIATRIC:  Calm and cooperative. 


 PIV looks clean.


General:  Alert, mild distress


Heart:  Regular rate (tele reg,  reviewed)


Extremities:  No clubbing





Labs


Labs:





Laboratory Tests








Test


 8/2/21


09:00


 


White Blood Count


 13.5 x10^3/uL


(4.0-11.0)


 


Red Blood Count


 5.18 x10^6/uL


(4.30-5.70)


 


Hemoglobin


 16.4 g/dL


(13.0-17.5)


 


Hematocrit


 48.6 %


(39.0-53.0)


 


Mean Corpuscular Volume 94 fL () 


 


Mean Corpuscular Hemoglobin 32 pg (25-35) 


 


Mean Corpuscular Hemoglobin


Concent 34 g/dL


(31-37)


 


Red Cell Distribution Width


 13.0 %


(11.5-14.5)


 


Platelet Count


 220 x10^3/uL


(140-400)


 


Neutrophils (%) (Auto) 93 % (31-73) 


 


Lymphocytes (%) (Auto) 4 % (24-48) 


 


Monocytes (%) (Auto) 3 % (0-9) 


 


Eosinophils (%) (Auto) 0 % (0-3) 


 


Basophils (%) (Auto) 0 % (0-3) 


 


Neutrophils # (Auto)


 12.5 x10^3/uL


(1.8-7.7)


 


Lymphocytes # (Auto)


 0.5 x10^3/uL


(1.0-4.8)


 


Monocytes # (Auto)


 0.4 x10^3/uL


(0.0-1.1)


 


Eosinophils # (Auto)


 0.0 x10^3/uL


(0.0-0.7)


 


Basophils # (Auto)


 0.0 x10^3/uL


(0.0-0.2)


 


Sodium Level


 139 mmol/L


(136-145)


 


Potassium Level


 4.8 mmol/L


(3.5-5.1)


 


Chloride Level


 100 mmol/L


()


 


Carbon Dioxide Level


 35 mmol/L


(21-32)


 


Anion Gap 4 (6-14) 


 


Blood Urea Nitrogen


 23 mg/dL


(8-26)


 


Creatinine


 0.9 mg/dL


(0.7-1.3)


 


Estimated GFR


(Cockcroft-Gault) 87.3 





 


BUN/Creatinine Ratio 26 (6-20) 


 


Glucose Level


 132 mg/dL


(70-99)


 


Calcium Level


 9.0 mg/dL


(8.5-10.1)


 


Total Bilirubin


 1.0 mg/dL


(0.2-1.0)


 


Aspartate Amino Transf


(AST/SGOT) 48 U/L (15-37) 





 


Alanine Aminotransferase


(ALT/SGPT) 190 U/L


(16-63)


 


Alkaline Phosphatase


 60 U/L


()


 


Total Protein


 6.7 g/dL


(6.4-8.2)


 


Albumin


 2.7 g/dL


(3.4-5.0)


 


Albumin/Globulin Ratio 0.7 (1.0-1.7) 











Comment


Review of Relevant


I have reviewed the following items genesis (where applicable) has been applied.





Justifications for Admission


Other Justification














TYE AMAYA MD         Aug 2, 2021 12:16

## 2021-08-02 NOTE — NUR
SS following up with discharge planning. SS reviewed pt chart and discussed with pt RN. Pt 
is currently on the BIPAP at 75%. COVID19 positive. Pt on IV Solu Medrol. SS will continue 
to follow for discharge planning.

## 2021-08-03 VITALS — DIASTOLIC BLOOD PRESSURE: 82 MMHG | SYSTOLIC BLOOD PRESSURE: 149 MMHG

## 2021-08-03 VITALS — SYSTOLIC BLOOD PRESSURE: 135 MMHG | DIASTOLIC BLOOD PRESSURE: 78 MMHG

## 2021-08-03 VITALS — DIASTOLIC BLOOD PRESSURE: 94 MMHG | SYSTOLIC BLOOD PRESSURE: 161 MMHG

## 2021-08-03 VITALS — SYSTOLIC BLOOD PRESSURE: 137 MMHG | DIASTOLIC BLOOD PRESSURE: 79 MMHG

## 2021-08-03 VITALS — SYSTOLIC BLOOD PRESSURE: 140 MMHG | DIASTOLIC BLOOD PRESSURE: 80 MMHG

## 2021-08-03 VITALS — SYSTOLIC BLOOD PRESSURE: 146 MMHG | DIASTOLIC BLOOD PRESSURE: 81 MMHG

## 2021-08-03 RX ADMIN — ACETAMINOPHEN PRN MG: 325 TABLET, FILM COATED ORAL at 20:34

## 2021-08-03 RX ADMIN — IPRATROPIUM BROMIDE AND ALBUTEROL SCH PUFF: 20; 100 SPRAY, METERED RESPIRATORY (INHALATION) at 13:58

## 2021-08-03 RX ADMIN — FAMOTIDINE SCH MG: 20 TABLET ORAL at 20:33

## 2021-08-03 RX ADMIN — METHYLPREDNISOLONE SODIUM SUCCINATE SCH MG: 125 INJECTION, POWDER, FOR SOLUTION INTRAMUSCULAR; INTRAVENOUS at 13:52

## 2021-08-03 RX ADMIN — Medication SCH MG: at 08:25

## 2021-08-03 RX ADMIN — CHOLECALCIFEROL CAP 125 MCG (5000 UNIT) SCH UNIT: 125 CAP at 08:25

## 2021-08-03 RX ADMIN — IPRATROPIUM BROMIDE AND ALBUTEROL SCH PUFF: 20; 100 SPRAY, METERED RESPIRATORY (INHALATION) at 20:33

## 2021-08-03 RX ADMIN — ZINC SULFATE CAP 220 MG (50 MG ELEMENTAL ZN) SCH MG: 220 (50 ZN) CAP at 08:25

## 2021-08-03 RX ADMIN — CETIRIZINE HYDROCHLORIDE SCH MG: 10 TABLET, FILM COATED ORAL at 08:25

## 2021-08-03 RX ADMIN — IPRATROPIUM BROMIDE AND ALBUTEROL SCH PUFF: 20; 100 SPRAY, METERED RESPIRATORY (INHALATION) at 16:00

## 2021-08-03 RX ADMIN — ENOXAPARIN SODIUM SCH MG: 40 INJECTION SUBCUTANEOUS at 20:33

## 2021-08-03 RX ADMIN — METHYLPREDNISOLONE SODIUM SUCCINATE SCH MG: 125 INJECTION, POWDER, FOR SOLUTION INTRAMUSCULAR; INTRAVENOUS at 20:34

## 2021-08-03 RX ADMIN — METHYLPREDNISOLONE SODIUM SUCCINATE SCH MG: 125 INJECTION, POWDER, FOR SOLUTION INTRAMUSCULAR; INTRAVENOUS at 04:52

## 2021-08-03 RX ADMIN — IPRATROPIUM BROMIDE AND ALBUTEROL SCH PUFF: 20; 100 SPRAY, METERED RESPIRATORY (INHALATION) at 08:00

## 2021-08-03 NOTE — PDOC
Infectious Disease Note


Subjective


Subjective


Patient on BiPAP


Frustrated as he is not able to go home


Remdesivir discontinued yesterday


Discussed with BRIDGET FONSECA


No nausea vomiting diarrhea or fever





Vital Sign


Vital Signs





Vital Signs








  Date Time  Temp Pulse Resp B/P (MAP) Pulse Ox O2 Delivery O2 Flow Rate FiO2


 


8/3/21 05:31     97 BiPAP/CPAP  


 


8/3/21 03:29 97.5 63 20 149/82 (104)    





 97.5       


 


8/2/21 20:58       15.0 











Physical Exam


PHYSICAL EXAM








GEN axo3 male on BIPAP


HEENT:  Normocephalic, atraumatic.  Anicteric. 


NECK:  Supple, no JVD.


LUNGS:  Crackles bilaterally.   


HEART:  S1, S2.  


ABDOMEN:  Soft, nontender, nondistended.  Bowel sounds present.


EXTREMITIES:  No edema, no cyanosis.


DERMATOLOGIC:  Warm, dry, no generalized rash.


NEUROLOGIC:  Alert, oriented, grossly nonfocal.


PSYCHIATRIC:  Calm and cooperative. 


 PIV looks clean.





Objective


Assessment


 


1.  COVID-19 pneumonia.


2.  Acute hypoxic respiratory failure.


3.  Degenerative joint disease.


4.  Lymphopenia.


5.  Hypokalemia. Resolved


6.  Abnormal LFTs. Worsened





Plan


Plan of Care


 


1.  Continue supportive care.


2.  Follow up labs and cultures.


3. s/p remdesivir.on  steroids per primary.


4.  Maintain aspiration precaution.


5.  





 .  Discussed with nursing staff.











DANAY HASSAN MD                Aug 3, 2021 09:12

## 2021-08-03 NOTE — NUR
SS following up with discharge planning. SS reviewed pt chart and discussed with pt RN. Pt 
is currently on the BIPAP at 75%. COVID19 positive. Pt on IV Solu-Medrol. ID and Pulmonology 
following. SS will continue to follow with discharge planning.

## 2021-08-03 NOTE — PDOC
[employer] PROGRESS NOTE


Date of Service


DOS:


DATE: 8/3/21 


TIME: 11:09





Chief Complaint


Chief Complaint


SEPSIS, was POA


severe malnutrition was POA, with obesity, BMI 32


Acute hypoxic respiratory failure


COVID-19 pneumonia requiring BiPAP


transaminitis, 





 


 iv fluid started





Continue IV Remdesivir


Continue IV thiamine and vitamin C


Continue IV steroids


Pending ferritin, LDH, CRP, D-dimer labs


Titrate O2 supplementation to maintain O2 saturation greater than 92%


Lovenox for DVT prophylaxis





History of Present Illness


History of Present Illness


Mr Read is a 56-year-old male with no significant past medical history except 

for degenerative joint disease who was transferred from [facility] 

yesterday with complaints of fever, headache, dry cough, shortness of breath for

the past 3 days.  Patient stated that he was actually exposed from his son he 

was worsening and required to come to the ED at [facility] and he was found to 

be hypoxic and required 2 L of nasal cannula oxygen.  Chest x-ray over there 

showed bilateral mild bibasilar infiltrates.  Covid test was positive and 

patient was transferred to [facility] for pulmonary evaluation and treatment.  Patient 

was started on Remdesivir and IV steroids.  Currently patient is requiring 15 L 

high flow nasal cannula and IV steroids.





7/30/2021


No acute events overnight.  Patient was saturating 92% on 15 L nasal cannula and

he had desatted down to 70% after he had to blow his nose.  Patient was placed 

on BiPAP at 100% FiO2.  Chest leg appears to be worsening.  Patient's chart, 

labs, images were reviewed and discussed with RN


8/1:  poor Po intake, dry,   dark urine


8/2: Desaturates to 72% when he takes off his BiPAP.  Advised to get back on 

BIPAP after eating. he is able to place himself back on. Frustrated with the s

everity of his hypoxia.





Status post remdesivir. Still requiring BiPAP 75% FiO2 with significant 

desaturations when he is not wearing assessment with nonrebreather O2.





Vitals/I&O


Vitals/I&O:





                                   Vital Signs








  Date Time  Temp Pulse Resp B/P (MAP) Pulse Ox O2 Delivery O2 Flow Rate FiO2


 


8/3/21 09:41     94 BiPAP/CPAP  


 


8/3/21 07:00 96.9 66 24 137/79 (98)    





 96.9       


 


8/2/21 20:58       15.0 














                                    I & O   


 


 8/2/21 8/2/21 8/3/21





 15:00 23:00 07:00


 


Intake Total 530 ml 400 ml 300 ml


 


Output Total  800 ml 550 ml


 


Balance 530 ml -400 ml -250 ml











Physical Exam


Physical Exam:








GEN axo3 male on BIPAP


HEENT:  Normocephalic, atraumatic.  Anicteric. 


NECK:  Supple, no JVD.


LUNGS:  Crackles bilaterally.   


HEART:  S1, S2.  


ABDOMEN:  Soft, nontender, nondistended.  Bowel sounds present.


EXTREMITIES:  No edema, no cyanosis.


DERMATOLOGIC:  Warm, dry, no generalized rash.


NEUROLOGIC:  Alert, oriented, grossly nonfocal.


PSYCHIATRIC:  Calm and cooperative. 


 PIV looks clean.


General:  Alert, mild distress


Heart:  Regular rate (tele reg,  reviewed)


Extremities:  No clubbing





Comment


Review of Relevant


I have reviewed the following items genesis (where applicable) has been applied.





Justifications for Admission


Other Justification














TYE AMAYA MD         Aug 3, 2021 11:09

## 2021-08-03 NOTE — PDOC
PULMONARY PROGRESS NOTES


DATE: 8/3/21 


TIME: 09:52


Subjective


Patient remains 75% BiPAP


No increased shortness of breath or cough


Afebrile


Vitals





Vital Signs








  Date Time  Temp Pulse Resp B/P (MAP) Pulse Ox O2 Delivery O2 Flow Rate FiO2


 


8/3/21 09:41     94 BiPAP/CPAP  


 


8/3/21 03:29 97.5 63 20 149/82 (104)    





 97.5       


 


8/2/21 20:58       15.0 








Comments


Pt. seen during covid -19 pandemic visual exam preformed 


RRR


BIPAP


no distress 


no obvious rash or edema


Labs





Laboratory Tests








Test


 8/1/21


11:39 8/2/21


09:00


 


Glucose (Fingerstick)


 137 mg/dL


(70-99) 





 


White Blood Count


 


 13.5 x10^3/uL


(4.0-11.0)


 


Red Blood Count


 


 5.18 x10^6/uL


(4.30-5.70)


 


Hemoglobin


 


 16.4 g/dL


(13.0-17.5)


 


Hematocrit


 


 48.6 %


(39.0-53.0)


 


Mean Corpuscular Volume  94 fL () 


 


Mean Corpuscular Hemoglobin  32 pg (25-35) 


 


Mean Corpuscular Hemoglobin


Concent 


 34 g/dL


(31-37)


 


Red Cell Distribution Width


 


 13.0 %


(11.5-14.5)


 


Platelet Count


 


 220 x10^3/uL


(140-400)


 


Neutrophils (%) (Auto)  93 % (31-73) 


 


Lymphocytes (%) (Auto)  4 % (24-48) 


 


Monocytes (%) (Auto)  3 % (0-9) 


 


Eosinophils (%) (Auto)  0 % (0-3) 


 


Basophils (%) (Auto)  0 % (0-3) 


 


Neutrophils # (Auto)


 


 12.5 x10^3/uL


(1.8-7.7)


 


Lymphocytes # (Auto)


 


 0.5 x10^3/uL


(1.0-4.8)


 


Monocytes # (Auto)


 


 0.4 x10^3/uL


(0.0-1.1)


 


Eosinophils # (Auto)


 


 0.0 x10^3/uL


(0.0-0.7)


 


Basophils # (Auto)


 


 0.0 x10^3/uL


(0.0-0.2)


 


Sodium Level


 


 139 mmol/L


(136-145)


 


Potassium Level


 


 4.8 mmol/L


(3.5-5.1)


 


Chloride Level


 


 100 mmol/L


()


 


Carbon Dioxide Level


 


 35 mmol/L


(21-32)


 


Anion Gap  4 (6-14) 


 


Blood Urea Nitrogen


 


 23 mg/dL


(8-26)


 


Creatinine


 


 0.9 mg/dL


(0.7-1.3)


 


Estimated GFR


(Cockcroft-Gault) 


 87.3 





 


BUN/Creatinine Ratio  26 (6-20) 


 


Glucose Level


 


 132 mg/dL


(70-99)


 


Calcium Level


 


 9.0 mg/dL


(8.5-10.1)


 


Total Bilirubin


 


 1.0 mg/dL


(0.2-1.0)


 


Aspartate Amino Transf


(AST/SGOT) 


 48 U/L (15-37) 





 


Alanine Aminotransferase


(ALT/SGPT) 


 190 U/L


(16-63)


 


Alkaline Phosphatase


 


 60 U/L


()


 


Total Protein


 


 6.7 g/dL


(6.4-8.2)


 


Albumin


 


 2.7 g/dL


(3.4-5.0)


 


Albumin/Globulin Ratio  0.7 (1.0-1.7) 








Medications





Active Scripts








 Medications  Dose


 Route/Sig


 Max Daily Dose Days Date Category Dose


Instructions


 


 Morphine Sulfate


 2 Mg/1 Ml


 Cartridge  2 Mg


 IV PRN Q4HRS PRN


   7/27/21 Reported 


 


 Zinc Sulfate 50


 Mg Tablet  220 Mg


 PO DAILY


   7/27/21 Reported 


 


 Methylprednisolone


 Sod Succ 125 Mg


 Vial  60 Mg


 IJ Q8HRS


   7/27/21 Reported 


 


 Combivent


 Respimat Inhal


  (Ipratropium/Albuterol


 Sulfate) 4 Gm


 Aer.w.adap  2 Inh


 IH QID


   7/27/21 Reported 


 


 Pepcid


  (Famotidine) 20


 Mg Tablet  20 Mg


 PO HS


   7/27/21 Reported 


 


 Lovenox


  (Enoxaparin


 Sodium) 30 Mg/0.3


 Ml Disp.syrin  30 Mg


 SQ DAILY


   7/27/21 Reported 


 


 Vitamin D3 **


  (Vitamin D) 125


 Mcg Capsule  125 Mcg


 PO DAILY


   7/27/21 Reported  5,000 UNITS = 125 MCG


 


 Zyrtec


  (Cetirizine Hcl)


 10 Mg Tablet  1 Tab


 PO DAILY


   7/27/21 Reported 


 


 Ascorbic Acid 500


 Mg Tablet  2 Mg


 PO DAILY


   7/27/21 Reported 


 


 Acetaminophen 325


 Mg Tablet  2 Tab


 PO PRN Q6HRS PRN


  30 7/27/21 Reported 


 


 No Known


 Medications Prior


 To Admisstion


  (Info)  Each  1 Each


 


   8/25/20 Reported 











Impression


.


IMPRESSION:


1.  Acute hypoxemic respiratory failure secondary to COVID-19 viral pneumonia.


2.  COVID-19 viral pneumonia.


3.  Abnormal chest x-ray, compatible with viral pneumonia.


4.  Nonspecific anxiety, possible clinical depression





Plan


.


Updated 8/3/21


Continue supplemental oxygen to keep sats above 92% on BIPAP 75%, monitor 

respiratory status closely


S/P remdesivir


Continue steroids currently on 60 mg IV twice daily, with slow taper, will need 

full 10-day course, started on 7/27/2021


PT/OT


DVT/GI PPX :lovenox 


D/W RN and RT





Updated 8/2/21


Continue supplemental oxygen to keep sats above 92% on BIPAP 75%, monitor 

respiratory status closely


S/P remdesivir


Continue steroids 


Off ABX and ID has signed off


PT/OT


DVT/GI PPX :lovenox 


D/W RN and RT








Updated 8/1


Continue BiPAP at 75%, decrease FiO2 as tolerated


Stressed the importance of sitting up in a chair,


Continue remdesivir, steroids,


As needed Haldol for anxiety


Discussed case with wife per telephone, updated her, answered all questions.











ASHLEY YEAGER MD                  Aug 3, 2021 09:54

## 2021-08-04 VITALS — DIASTOLIC BLOOD PRESSURE: 87 MMHG | SYSTOLIC BLOOD PRESSURE: 139 MMHG

## 2021-08-04 VITALS — DIASTOLIC BLOOD PRESSURE: 85 MMHG | SYSTOLIC BLOOD PRESSURE: 148 MMHG

## 2021-08-04 VITALS — SYSTOLIC BLOOD PRESSURE: 145 MMHG | DIASTOLIC BLOOD PRESSURE: 89 MMHG

## 2021-08-04 VITALS — DIASTOLIC BLOOD PRESSURE: 93 MMHG | SYSTOLIC BLOOD PRESSURE: 149 MMHG

## 2021-08-04 VITALS — SYSTOLIC BLOOD PRESSURE: 155 MMHG | DIASTOLIC BLOOD PRESSURE: 87 MMHG

## 2021-08-04 VITALS — SYSTOLIC BLOOD PRESSURE: 143 MMHG | DIASTOLIC BLOOD PRESSURE: 83 MMHG

## 2021-08-04 RX ADMIN — ZINC SULFATE CAP 220 MG (50 MG ELEMENTAL ZN) SCH MG: 220 (50 ZN) CAP at 09:51

## 2021-08-04 RX ADMIN — Medication SCH MG: at 09:00

## 2021-08-04 RX ADMIN — METHYLPREDNISOLONE SODIUM SUCCINATE SCH MG: 125 INJECTION, POWDER, FOR SOLUTION INTRAMUSCULAR; INTRAVENOUS at 13:24

## 2021-08-04 RX ADMIN — METHYLPREDNISOLONE SODIUM SUCCINATE SCH MG: 125 INJECTION, POWDER, FOR SOLUTION INTRAMUSCULAR; INTRAVENOUS at 22:00

## 2021-08-04 RX ADMIN — CHOLECALCIFEROL CAP 125 MCG (5000 UNIT) SCH UNIT: 125 CAP at 09:51

## 2021-08-04 RX ADMIN — IPRATROPIUM BROMIDE AND ALBUTEROL SCH PUFF: 20; 100 SPRAY, METERED RESPIRATORY (INHALATION) at 20:00

## 2021-08-04 RX ADMIN — Medication SCH MG: at 09:51

## 2021-08-04 RX ADMIN — ENOXAPARIN SODIUM SCH MG: 40 INJECTION SUBCUTANEOUS at 20:00

## 2021-08-04 RX ADMIN — IPRATROPIUM BROMIDE AND ALBUTEROL SCH PUFF: 20; 100 SPRAY, METERED RESPIRATORY (INHALATION) at 11:45

## 2021-08-04 RX ADMIN — FAMOTIDINE SCH MG: 20 TABLET ORAL at 21:00

## 2021-08-04 RX ADMIN — CETIRIZINE HYDROCHLORIDE SCH MG: 10 TABLET, FILM COATED ORAL at 09:51

## 2021-08-04 RX ADMIN — IPRATROPIUM BROMIDE AND ALBUTEROL SCH PUFF: 20; 100 SPRAY, METERED RESPIRATORY (INHALATION) at 09:51

## 2021-08-04 RX ADMIN — IPRATROPIUM BROMIDE AND ALBUTEROL SCH PUFF: 20; 100 SPRAY, METERED RESPIRATORY (INHALATION) at 16:29

## 2021-08-04 RX ADMIN — METHYLPREDNISOLONE SODIUM SUCCINATE SCH MG: 125 INJECTION, POWDER, FOR SOLUTION INTRAMUSCULAR; INTRAVENOUS at 05:10

## 2021-08-04 NOTE — PDOC
[employer] PROGRESS NOTE


Date of Service


DOS:


DATE: 8/4/21 


TIME: 07:47





Chief Complaint


Chief Complaint


A/P:


SEPSIS, was POA


Severe malnutrition was POA, with obesity, BMI 32


Acute hypoxic respiratory failure


COVID-19 pneumonia requiring BiPAP


transaminitis, 


 


 iv fluid started





Continue IV Remdesivir


Continue IV thiamine and vitamin C


Continue IV steroids


Pending ferritin, LDH, CRP, D-dimer labs


Titrate O2 supplementation to maintain O2 saturation greater than 92%


Lovenox for DVT prophylaxis





History of Present Illness


History of Present Illness


Mr Read is a 56-year-old male with no significant past medical history except 

for degenerative joint disease who was transferred from   

with complaints of fever, headache, dry cough, shortness of breath for the past 

3 days.  Patient stated that he was actually exposed from his son he was 

worsening and required to come to the ED at [facility] and he was found to be 

hypoxic and required 2 L of nasal cannula oxygen.  Chest x-ray over there showed

bilateral mild bibasilar infiltrates.  Covid test was positive and patient was 

transferred to [facility] for pulmonary evaluation and treatment.  Patient was started 

on Remdesivir and IV steroids.  Currently patient is requiring 15 L high flow 

nasal cannula and IV steroids.





7/30: No acute events overnight.  Patient was saturating 92% on 15 L nasal 

cannula and he had desatted down to 70% after he had to blow his nose.  Patient 

was placed on BiPAP at 100% FiO2.  Chest leg appears to be worsening.


7/31: Last dose of remdesivir. Could not tolerate vapotherm and is now on BIPAP.


8/1:  poor Po intake, dry,   dark urine


8/2: Desaturates to 72% when he takes off his BiPAP.  Advised to get back on 

BIPAP after eating. he is able to place himself back on. Frustrated with the 

severity of his hypoxia.


8/3: Status post remdesivir. Still requiring BiPAP 75% FiO2 with significant 

desaturations when he is not wearing assessment with nonrebreather O2.





Still with significant desaturations when he raises BiPAP even on 15 L high flow

nasal cannula.  Discussed transitioning back to Vapotherm if we can treat his 

anxiety.  He is amenable to this.  He is frustrated with staying in the hospital

understands he may be a longer term COVID recovery and is amenable to LTACH 

referral.  I discussed with pulmonology.





Vitals/I&O


Vitals/I&O:





                                   Vital Signs








  Date Time  Temp Pulse Resp B/P (MAP) Pulse Ox O2 Delivery O2 Flow Rate FiO2


 


8/4/21 07:20     91 BiPAP/CPAP  


 


8/4/21 02:18 97.5 63 24 148/85 (106)    





 97.5       














                                    I & O   


 


 8/3/21 8/3/21 8/4/21





 15:00 23:00 07:00


 


Intake Total   200 ml


 


Output Total 675 ml 500 ml 1150 ml


 


Balance -675 ml -500 ml -950 ml











Physical Exam


Physical Exam:








GEN axo3 male on BIPAP


HEENT:  Normocephalic, atraumatic.  Anicteric. 


NECK:  Supple, no JVD.


LUNGS:  Crackles bilaterally.   


HEART:  S1, S2.  


ABDOMEN:  Soft, nontender, nondistended.  Bowel sounds present.


EXTREMITIES:  No edema, no cyanosis.


DERMATOLOGIC:  Warm, dry, no generalized rash.


NEUROLOGIC:  Alert, oriented, grossly nonfocal.


PSYCHIATRIC:  Calm and cooperative. 


 PIV looks clean.


General:  Alert, mild distress


Heart:  Regular rate (tele reg,  reviewed)


Extremities:  No clubbing





Comment


Review of Relevant


I have reviewed the following items genesis (where applicable) has been applied.





Justifications for Admission


Other Justification














TYE AMAYA MD         Aug 4, 2021 07:49

## 2021-08-04 NOTE — PDOC
Infectious Disease Note


Subjective


Subjective


Patient on BiPAP


Frustrated as he is not able to go home


Remdesivir discontinued yesterday


Discussed with RN





Vital Sign


Vital Signs





Vital Signs








  Date Time  Temp Pulse Resp B/P (MAP) Pulse Ox O2 Delivery O2 Flow Rate FiO2


 


8/4/21 07:20     91 BiPAP/CPAP  


 


8/4/21 07:00 97.8 64 16 139/87 (104)    





 97.8       











Physical Exam


PHYSICAL EXAM








GEN axo3 male on BIPAP


HEENT:  Normocephalic, atraumatic.  Anicteric. 


NECK:  Supple, no JVD.


LUNGS:  Crackles bilaterally.   


HEART:  S1, S2.  


ABDOMEN:  Soft, nontender, nondistended.  Bowel sounds present.


EXTREMITIES:  No edema, no cyanosis.


DERMATOLOGIC:  Warm, dry, no generalized rash.


NEUROLOGIC:  Alert, oriented, grossly nonfocal.


PSYCHIATRIC:  Calm and cooperative. 


 PIV looks clean.





Objective


Assessment


 


1.  COVID-19 pneumonia.


2.  Acute hypoxic respiratory failure.


3.  Degenerative joint disease.


4.  Lymphopenia.


5.  Hypokalemia. Resolved


6.  Abnormal LFTs. Worsened





Plan


Plan of Care


 


1.  Continue supportive care.


2.  Follow up labs and cultures.


3. s/p remdesivir.on  steroids per primary.


4.  Maintain aspiration precaution.


5.  





 .  Discussed with nursing staff.











DANAY HASSAN MD                Aug 4, 2021 11:16

## 2021-08-04 NOTE — NUR
PT USES NONREBREATHER WHEN EATING BREAKFAST. SPO2 DROPS TO 78-81% WHEN HE TAKES BITES. PT 
EDUCATED ON PUTTING NONREBREATHER ON ASAP AFTER GETTING A BITE OR EVEN KEEPING NONREBREATHER 
BELOW MOUTH AND NOSE FOR BLOWBY.

## 2021-08-04 NOTE — NUR
SS following up with discharge planning. SS reviewed pt chart and discussed with pt RN. Pt 
is currently on BIPAP at 70%. COVID19 positive. Pt on IV Solu Medrol. Dr. Lancaster met with pt 
and discussed plan of care. Pt agreeable to referral to LTACH facility and notified Dr. Lancaster that his #1 choice is [facility], [phone]; fax [phone], and 
#2 choice is [facility], [phone]; fax [phone]. Referral phoned and faxed to 
[facility]. SS will continue to follow for discharge planning. 

-------------------------------------------------------------------------------

Addendum: 08/04/21 at 1626 by KASEY REARDON SS

-------------------------------------------------------------------------------

[facility] contacted SS and stated that they have no [employer] beds for more than a week. SS phoned 
and faxed referral to [facility]. Currently awaiting acceptance decision at this time.

## 2021-08-04 NOTE — NUR
SPO2 64%. THIS RN ENTERED PT'S ROOM. PT SITTING AT BEDSIDE, FACE SLIGHTLY FLUSHED. PT SAID 
HE HAD JUST FINISHED EATING AND USING URINAL USING NONREBREATHER. PT TRANSITIONED HIMSELF TO 
BIPAP. PT'S SPO2 REMAINS 79%.

-------------------------------------------------------------------------------

Addendum: 08/04/21 at 1226 by EVELIN THOMAS RN RN

-------------------------------------------------------------------------------

THIS RN REENTERED PT'S ROOM. PT IS STILL SITTING UPRIGHT ON BEDSIDE. PT SAYS HE'S TRYING TO 
COUGH SO HE PULLS THE BIPAP AWAY FROM HIS FACE TO DO THIS. PT SAYS HE'S ALSO TRYING TO 
PERFORM [facility]ARE. I OFFERED TO HELP BUT PT DECLINED. PT ALSO DECLINES CHANGING THE SETTINGS 
ON BIPAP UNTIL HE IMPROVES ON SPO2. WILL CONTINUE TO MONITOR.

## 2021-08-04 NOTE — PDOC
PULMONARY PROGRESS NOTES


DATE: 8/4/21 


TIME: 10:08


Subjective


Patient patient down to 70% FiO2.  Via BiPAP.  Setting at 12/6.  Feels better.


No increased shortness of breath or cough


Afebrile


Vitals





Vital Signs








  Date Time  Temp Pulse Resp B/P (MAP) Pulse Ox O2 Delivery O2 Flow Rate FiO2


 


8/4/21 07:20     91 BiPAP/CPAP  


 


8/4/21 07:00 97.8 64 16 139/87 (104)    





 97.8       








Comments


Pt. seen during covid -19 pandemic visual exam preformed 


RRR


BIPAP


no distress 


no obvious rash or edema


Medications





Active Scripts








 Medications  Dose


 Route/Sig


 Max Daily Dose Days Date Category Dose


Instructions


 


 Morphine Sulfate


 2 Mg/1 Ml


 Cartridge  2 Mg


 IV PRN Q4HRS PRN


   7/27/21 Reported 


 


 Zinc Sulfate 50


 Mg Tablet  220 Mg


 PO DAILY


   7/27/21 Reported 


 


 Methylprednisolone


 Sod Succ 125 Mg


 Vial  60 Mg


 IJ Q8HRS


   7/27/21 Reported 


 


 Combivent


 Respimat Inhal


  (Ipratropium/Albuterol


 Sulfate) 4 Gm


 Aer.w.adap  2 Inh


 IH QID


   7/27/21 Reported 


 


 Pepcid


  (Famotidine) 20


 Mg Tablet  20 Mg


 PO HS


   7/27/21 Reported 


 


 Lovenox


  (Enoxaparin


 Sodium) 30 Mg/0.3


 Ml Disp.syrin  30 Mg


 SQ DAILY


   7/27/21 Reported 


 


 Vitamin D3 **


  (Vitamin D) 125


 Mcg Capsule  125 Mcg


 PO DAILY


   7/27/21 Reported  5,000 UNITS = 125 MCG


 


 Zyrtec


  (Cetirizine Hcl)


 10 Mg Tablet  1 Tab


 PO DAILY


   7/27/21 Reported 


 


 Ascorbic Acid 500


 Mg Tablet  2 Mg


 PO DAILY


   7/27/21 Reported 


 


 Acetaminophen 325


 Mg Tablet  2 Tab


 PO PRN Q6HRS PRN


  30 7/27/21 Reported 


 


 No Known


 Medications Prior


 To Admisstion


  (Info)  Each  1 Each


 


   8/25/20 Reported 











Impression


.


IMPRESSION:


1.  Acute hypoxemic respiratory failure secondary to COVID-19 viral 

pneumonia./Acute lung injury and early ARDS.  Slowly improving


2.  COVID-19 viral pneumonia.


3.  Abnormal chest x-ray, compatible with viral pneumonia.


4.  Nonspecific anxiety, possible clinical depression





Plan


.


Updated 8/3/21


Continue supplemental oxygen to keep sats above 92% on BIPAP 70%, monitor 

respiratory status closely


S/P remdesivir


Continue steroids currently on 60 mg IV twice daily, with slow taper, will need 

full 10-day course, started on 7/27/2021


PT/OT


DVT/GI PPX :lovenox 


D/W RN and RT








Updated 8/3/21


Continue supplemental oxygen to keep sats above 92% on BIPAP 75%, monitor 

respiratory status closely


S/P remdesivir


Continue steroids currently on 60 mg IV twice daily, with slow taper, will need 

full 10-day course, started on 7/27/2021


PT/OT


DVT/GI PPX :lovenox 


D/W RN and RT





Updated 8/2/21


Continue supplemental oxygen to keep sats above 92% on BIPAP 75%, monitor 

respiratory status closely


S/P remdesivir


Continue steroids 


Off ABX and ID has signed off


PT/OT


DVT/GI PPX :lovenox 


D/W RN and RT








Updated 8/1


Continue BiPAP at 75%, decrease FiO2 as tolerated


Stressed the importance of sitting up in a chair,


Continue remdesivir, steroids,


As needed Haldol for anxiety


Discussed case with wife per telephone, updated her, answered all questions.











ASHLEY YEAGER MD                  Aug 4, 2021 10:09

## 2021-08-05 VITALS — DIASTOLIC BLOOD PRESSURE: 88 MMHG | SYSTOLIC BLOOD PRESSURE: 141 MMHG

## 2021-08-05 VITALS — SYSTOLIC BLOOD PRESSURE: 144 MMHG | DIASTOLIC BLOOD PRESSURE: 89 MMHG

## 2021-08-05 VITALS — SYSTOLIC BLOOD PRESSURE: 139 MMHG | DIASTOLIC BLOOD PRESSURE: 83 MMHG

## 2021-08-05 VITALS — SYSTOLIC BLOOD PRESSURE: 135 MMHG | DIASTOLIC BLOOD PRESSURE: 85 MMHG

## 2021-08-05 VITALS — SYSTOLIC BLOOD PRESSURE: 141 MMHG | DIASTOLIC BLOOD PRESSURE: 72 MMHG

## 2021-08-05 VITALS — DIASTOLIC BLOOD PRESSURE: 95 MMHG | SYSTOLIC BLOOD PRESSURE: 148 MMHG

## 2021-08-05 RX ADMIN — IPRATROPIUM BROMIDE AND ALBUTEROL SCH PUFF: 20; 100 SPRAY, METERED RESPIRATORY (INHALATION) at 12:59

## 2021-08-05 RX ADMIN — IPRATROPIUM BROMIDE AND ALBUTEROL SCH PUFF: 20; 100 SPRAY, METERED RESPIRATORY (INHALATION) at 20:00

## 2021-08-05 RX ADMIN — IPRATROPIUM BROMIDE AND ALBUTEROL SCH PUFF: 20; 100 SPRAY, METERED RESPIRATORY (INHALATION) at 17:19

## 2021-08-05 RX ADMIN — FAMOTIDINE SCH MG: 20 TABLET ORAL at 20:55

## 2021-08-05 RX ADMIN — ZINC SULFATE CAP 220 MG (50 MG ELEMENTAL ZN) SCH MG: 220 (50 ZN) CAP at 08:31

## 2021-08-05 RX ADMIN — CETIRIZINE HYDROCHLORIDE SCH MG: 10 TABLET, FILM COATED ORAL at 08:31

## 2021-08-05 RX ADMIN — ENOXAPARIN SODIUM SCH MG: 40 INJECTION SUBCUTANEOUS at 20:55

## 2021-08-05 RX ADMIN — METHYLPREDNISOLONE SODIUM SUCCINATE SCH MG: 125 INJECTION, POWDER, FOR SOLUTION INTRAMUSCULAR; INTRAVENOUS at 06:28

## 2021-08-05 RX ADMIN — CHOLECALCIFEROL CAP 125 MCG (5000 UNIT) SCH UNIT: 125 CAP at 08:31

## 2021-08-05 RX ADMIN — ACETAMINOPHEN PRN MG: 325 TABLET, FILM COATED ORAL at 06:28

## 2021-08-05 RX ADMIN — Medication SCH MG: at 08:31

## 2021-08-05 RX ADMIN — METHYLPREDNISOLONE SODIUM SUCCINATE SCH MG: 125 INJECTION, POWDER, FOR SOLUTION INTRAMUSCULAR; INTRAVENOUS at 14:20

## 2021-08-05 RX ADMIN — ACETAMINOPHEN PRN MG: 325 TABLET, FILM COATED ORAL at 20:55

## 2021-08-05 RX ADMIN — METHYLPREDNISOLONE SODIUM SUCCINATE SCH MG: 125 INJECTION, POWDER, FOR SOLUTION INTRAMUSCULAR; INTRAVENOUS at 20:56

## 2021-08-05 RX ADMIN — IPRATROPIUM BROMIDE AND ALBUTEROL SCH PUFF: 20; 100 SPRAY, METERED RESPIRATORY (INHALATION) at 08:31

## 2021-08-05 NOTE — PDOC
PULMONARY PROGRESS NOTES


DATE: 8/5/21 


TIME: 10:02


Subjective


Patient patient down to 70% FiO2.  Via BiPAP.  Setting at 12/6.  Feels better.  

Did not tolerated Vapotherm.


No increased shortness of breath or cough


Afebrile


Vitals





Vital Signs








  Date Time  Temp Pulse Resp B/P (MAP) Pulse Ox O2 Delivery O2 Flow Rate FiO2


 


8/5/21 07:55     93 BiPAP/CPAP  


 


8/5/21 07:00 97.8 75 16 141/72 (95)    





 97.8       








Comments


Pt. seen during covid -19 pandemic visual exam preformed 


BIPAP


no distress 


no obvious rash or edema


Medications





Active Scripts








 Medications  Dose


 Route/Sig


 Max Daily Dose Days Date Category Dose


Instructions


 


 Morphine Sulfate


 2 Mg/1 Ml


 Cartridge  2 Mg


 IV PRN Q4HRS PRN


   7/27/21 Reported 


 


 Zinc Sulfate 50


 Mg Tablet  220 Mg


 PO DAILY


   7/27/21 Reported 


 


 Methylprednisolone


 Sod Succ 125 Mg


 Vial  60 Mg


 IJ Q8HRS


   7/27/21 Reported 


 


 Combivent


 Respimat Inhal


  (Ipratropium/Albuterol


 Sulfate) 4 Gm


 Aer.w.adap  2 Inh


 IH QID


   7/27/21 Reported 


 


 Pepcid


  (Famotidine) 20


 Mg Tablet  20 Mg


 PO HS


   7/27/21 Reported 


 


 Lovenox


  (Enoxaparin


 Sodium) 30 Mg/0.3


 Ml Disp.syrin  30 Mg


 SQ DAILY


   7/27/21 Reported 


 


 Vitamin D3 **


  (Vitamin D) 125


 Mcg Capsule  125 Mcg


 PO DAILY


   7/27/21 Reported  5,000 UNITS = 125 MCG


 


 Zyrtec


  (Cetirizine Hcl)


 10 Mg Tablet  1 Tab


 PO DAILY


   7/27/21 Reported 


 


 Ascorbic Acid 500


 Mg Tablet  2 Mg


 PO DAILY


   7/27/21 Reported 


 


 Acetaminophen 325


 Mg Tablet  2 Tab


 PO PRN Q6HRS PRN


  30 7/27/21 Reported 


 


 No Known


 Medications Prior


 To Admisstion


  (Info)  Each  1 Each


 


   8/25/20 Reported 











Impression


.


IMPRESSION:


1.  Acute hypoxemic respiratory failure secondary to COVID-19 viral 

pneumonia./Acute lung injury and early ARDS.  Slowly improving


2.  COVID-19 viral pneumonia.


3.  Abnormal chest x-ray, compatible with viral pneumonia.


4.  Nonspecific anxiety, possible clinical depression





Plan


.


Updated 8/5/21


Continue supplemental oxygen to keep sats above 92% on BIPAP 70%, monitor 

respiratory status closely


S/P remdesivir


Continue steroids currently on 60 mg IV twice daily, with slow taper, will need 

full 10-day course, started on 7/27/2021


PT/OT


DVT/GI PPX :lovenox 


D/W RN and RT


Awaiting LTAC transfer





Updated 8/4/21


Continue supplemental oxygen to keep sats above 92% on BIPAP 70%, monitor 

respiratory status closely


S/P remdesivir


Continue steroids currently on 60 mg IV twice daily, with slow taper, will need 

full 10-day course, started on 7/27/2021


PT/OT


DVT/GI PPX :lovenox 


D/W RN and RT








Updated 8/3/21


Continue supplemental oxygen to keep sats above 92% on BIPAP 75%, monitor 

respiratory status closely


S/P remdesivir


Continue steroids currently on 60 mg IV twice daily, with slow taper, will need 

full 10-day course, started on 7/27/2021


PT/OT


DVT/GI PPX :lovenox 


D/W RN and RT





Updated 8/2/21


Continue supplemental oxygen to keep sats above 92% on BIPAP 75%, monitor 

respiratory status closely


S/P remdesivir


Continue steroids 


Off ABX and ID has signed off


PT/OT


DVT/GI PPX :lovenox 


D/W RN and RT








Updated 8/1


Continue BiPAP at 75%, decrease FiO2 as tolerated


Stressed the importance of sitting up in a chair,


Continue remdesivir, steroids,


As needed Haldol for anxiety


Discussed case with wife per telephone, updated her, answered all questions.











ASHLEY YEAGER MD                  Aug 5, 2021 10:04

## 2021-08-05 NOTE — NUR
SS following up with discharge planning. SS reviewed pt chart and discussed with pt RN. Pt 
is currently requiring BIPAP at 70%. COVID19 positive. Pt on IV Solu-Medrol. LTACH referral 
recommended. Pt accepted at [facility], [phone]; fax [phone], pending 
insurance authorization. SS will continue to follow for discharge planning.

## 2021-08-05 NOTE — PDOC
TE[employer] PROGRESS NOTE


Date of Service


DOS:


DATE: 8/5/21 


TIME: 10:56





Chief Complaint


Chief Complaint


A/P:


SEPSIS, was POA


Severe malnutrition was POA, with obesity, BMI 32


Acute hypoxic respiratory failure


COVID-19 pneumonia requiring BiPAP


transaminitis, 


 


 iv fluid started





Continue IV Remdesivir


Continue IV thiamine and vitamin C


Continue IV steroids


Pending ferritin, LDH, CRP, D-dimer labs


Titrate O2 supplementation to maintain O2 saturation greater than 92%


Lovenox for DVT prophylaxis





History of Present Illness


History of Present Illness


Mr Read is a 56-year-old male with no significant past medical history except 

for degenerative joint disease who was transferred from [facility]  

with complaints of fever, headache, dry cough, shortness of breath for the past 

3 days.  Patient stated that he was actually exposed from his son he was 

worsening and required to come to the ED at [facility] and he was found to be 

hypoxic and required 2 L of nasal cannula oxygen.  Chest x-ray over there showed

bilateral mild bibasilar infiltrates.  Covid test was positive and patient was 

transferred to [facility] for pulmonary evaluation and treatment.  Patient was started 

on Remdesivir and IV steroids.  Currently patient is requiring 15 L high flow 

nasal cannula and IV steroids.





7/30: No acute events overnight.  Patient was saturating 92% on 15 L nasal 

cannula and he had desatted down to 70% after he had to blow his nose.  Patient 

was placed on BiPAP at 100% FiO2.  Chest leg appears to be worsening.


7/31: Last dose of remdesivir. Could not tolerate vapotherm and is now on BIPAP.


8/1:  poor Po intake, dry,   dark urine


8/2: Desaturates to 72% when he takes off his BiPAP.  Advised to get back on 

BIPAP after eating. he is able to place himself back on. Frustrated with the 

severity of his hypoxia.


8/3: Status post remdesivir. Still requiring BiPAP 75% FiO2 with significant 

desaturations when he is not wearing assessment with nonrebreather O2.


8/4: Still with significant desaturations when he raises BiPAP even on 15 L high

flow nasal cannula.  Discussed transitioning back to Vapotherm if we can treat 

his anxiety.  He is amenable to this.  He is frustrated with staying in the 

hospital understands he may be a longer term COVID recovery and is amenable to 

LTACH referral.  I discussed with pulmonology.





Afebrile. 70% FiO2 on BiPAP 12/6.  Feels better.  Did not tolerated Vapotherm.  

Significant desaturations when he transitions to high flow nasal cannula O2 

while eating.  Discussed referral to LTACH with pulmonology and ID as it appears

he will need a longer COVID recovery.





Vitals/I&O


Vitals/I&O:





                                   Vital Signs








  Date Time  Temp Pulse Resp B/P (MAP) Pulse Ox O2 Delivery O2 Flow Rate FiO2


 


8/5/21 09:45     73 Room Air  


 


8/5/21 07:00 97.8 75 16 141/72 (95)    





 97.8       














                                    I & O   


 


 8/4/21 8/4/21 8/5/21





 15:00 23:00 07:00


 


Intake Total 500 ml  200 ml


 


Output Total 1650 ml  200 ml


 


Balance -1150 ml  0 ml











Physical Exam


Physical Exam:








GEN axo3 male on BIPAP


HEENT:  Normocephalic, atraumatic.  Anicteric. 


NECK:  Supple, no JVD.


LUNGS:  Crackles bilaterally.   


HEART:  S1, S2.  


ABDOMEN:  Soft, nontender, nondistended.  Bowel sounds present.


EXTREMITIES:  No edema, no cyanosis.


DERMATOLOGIC:  Warm, dry, no generalized rash.


NEUROLOGIC:  Alert, oriented, grossly nonfocal.


PSYCHIATRIC:  Calm and cooperative. 


 PIV looks clean.


General:  Alert, mild distress


Heart:  Regular rate (tele reg,  reviewed)


Extremities:  No clubbing





Comment


Review of Relevant


I have reviewed the following items genesis (where applicable) has been applied.





Justifications for Admission


Other Justification














TYE AMAYA MD         Aug 5, 2021 10:57

## 2021-08-06 VITALS — DIASTOLIC BLOOD PRESSURE: 103 MMHG | SYSTOLIC BLOOD PRESSURE: 139 MMHG

## 2021-08-06 VITALS — SYSTOLIC BLOOD PRESSURE: 128 MMHG | DIASTOLIC BLOOD PRESSURE: 83 MMHG

## 2021-08-06 VITALS — DIASTOLIC BLOOD PRESSURE: 96 MMHG | SYSTOLIC BLOOD PRESSURE: 142 MMHG

## 2021-08-06 VITALS — SYSTOLIC BLOOD PRESSURE: 135 MMHG | DIASTOLIC BLOOD PRESSURE: 84 MMHG

## 2021-08-06 VITALS — SYSTOLIC BLOOD PRESSURE: 142 MMHG | DIASTOLIC BLOOD PRESSURE: 80 MMHG

## 2021-08-06 VITALS — SYSTOLIC BLOOD PRESSURE: 135 MMHG | DIASTOLIC BLOOD PRESSURE: 83 MMHG

## 2021-08-06 RX ADMIN — IPRATROPIUM BROMIDE AND ALBUTEROL SCH PUFF: 20; 100 SPRAY, METERED RESPIRATORY (INHALATION) at 20:00

## 2021-08-06 RX ADMIN — IPRATROPIUM BROMIDE AND ALBUTEROL SCH PUFF: 20; 100 SPRAY, METERED RESPIRATORY (INHALATION) at 18:03

## 2021-08-06 RX ADMIN — IPRATROPIUM BROMIDE AND ALBUTEROL SCH PUFF: 20; 100 SPRAY, METERED RESPIRATORY (INHALATION) at 08:27

## 2021-08-06 RX ADMIN — IPRATROPIUM BROMIDE AND ALBUTEROL SCH PUFF: 20; 100 SPRAY, METERED RESPIRATORY (INHALATION) at 11:51

## 2021-08-06 RX ADMIN — ZINC SULFATE CAP 220 MG (50 MG ELEMENTAL ZN) SCH MG: 220 (50 ZN) CAP at 08:27

## 2021-08-06 RX ADMIN — ACETAMINOPHEN PRN MG: 325 TABLET, FILM COATED ORAL at 11:51

## 2021-08-06 RX ADMIN — FAMOTIDINE SCH MG: 20 TABLET ORAL at 20:45

## 2021-08-06 RX ADMIN — ACETAMINOPHEN PRN MG: 325 TABLET, FILM COATED ORAL at 18:03

## 2021-08-06 RX ADMIN — WATER PRN ML: 1 INJECTION INTRAVENOUS at 18:00

## 2021-08-06 RX ADMIN — METHYLPREDNISOLONE SODIUM SUCCINATE SCH MG: 40 INJECTION, POWDER, FOR SOLUTION INTRAMUSCULAR; INTRAVENOUS at 18:35

## 2021-08-06 RX ADMIN — CHOLECALCIFEROL CAP 125 MCG (5000 UNIT) SCH UNIT: 125 CAP at 08:27

## 2021-08-06 RX ADMIN — CETIRIZINE HYDROCHLORIDE SCH MG: 10 TABLET, FILM COATED ORAL at 08:27

## 2021-08-06 RX ADMIN — METHYLPREDNISOLONE SODIUM SUCCINATE SCH MG: 125 INJECTION, POWDER, FOR SOLUTION INTRAMUSCULAR; INTRAVENOUS at 05:41

## 2021-08-06 RX ADMIN — METHYLPREDNISOLONE SODIUM SUCCINATE SCH MG: 40 INJECTION, POWDER, FOR SOLUTION INTRAMUSCULAR; INTRAVENOUS at 18:04

## 2021-08-06 RX ADMIN — ENOXAPARIN SODIUM SCH MG: 40 INJECTION SUBCUTANEOUS at 20:46

## 2021-08-06 RX ADMIN — Medication SCH MG: at 10:00

## 2021-08-06 RX ADMIN — WATER PRN ML: 1 INJECTION INTRAVENOUS at 16:32

## 2021-08-06 RX ADMIN — Medication SCH MG: at 08:27

## 2021-08-06 NOTE — PDOC
PULMONARY PROGRESS NOTES


DATE: 8/6/21 


TIME: 10:10


Subjective


Patient on 70% FiO2.  Via BiPAP.  Setting at 12/6.  Feels better.  Did not 

tolerated Vapotherm.  But willing to try again


No increased shortness of breath or cough


Afebrile


Vitals





Vital Signs








  Date Time  Temp Pulse Resp B/P (MAP) Pulse Ox O2 Delivery O2 Flow Rate FiO2


 


8/6/21 09:21     83 NonRebreather Mask 15.0 


 


8/6/21 07:00 97.1 86 18 142/96 (111)    





 97.1       








Comments


Pt. seen during covid -19 pandemic visual exam preformed 


BIPAP


no distress 


no obvious rash or edema


Medications





Active Scripts








 Medications  Dose


 Route/Sig


 Max Daily Dose Days Date Category Dose


Instructions


 


 Morphine Sulfate


 2 Mg/1 Ml


 Cartridge  2 Mg


 IV PRN Q4HRS PRN


   7/27/21 Reported 


 


 Zinc Sulfate 50


 Mg Tablet  220 Mg


 PO DAILY


   7/27/21 Reported 


 


 Methylprednisolone


 Sod Succ 125 Mg


 Vial  60 Mg


 IJ Q8HRS


   7/27/21 Reported 


 


 Combivent


 Respimat Inhal


  (Ipratropium/Albuterol


 Sulfate) 4 Gm


 Aer.w.adap  2 Inh


 IH QID


   7/27/21 Reported 


 


 Pepcid


  (Famotidine) 20


 Mg Tablet  20 Mg


 PO HS


   7/27/21 Reported 


 


 Lovenox


  (Enoxaparin


 Sodium) 30 Mg/0.3


 Ml Disp.syrin  30 Mg


 SQ DAILY


   7/27/21 Reported 


 


 Vitamin D3 **


  (Vitamin D) 125


 Mcg Capsule  125 Mcg


 PO DAILY


   7/27/21 Reported  5,000 UNITS = 125 MCG


 


 Zyrtec


  (Cetirizine Hcl)


 10 Mg Tablet  1 Tab


 PO DAILY


   7/27/21 Reported 


 


 Ascorbic Acid 500


 Mg Tablet  2 Mg


 PO DAILY


   7/27/21 Reported 


 


 Acetaminophen 325


 Mg Tablet  2 Tab


 PO PRN Q6HRS PRN


  30 7/27/21 Reported 


 


 No Known


 Medications Prior


 To Admisstion


  (Info)  Each  1 Each


 


   8/25/20 Reported 











Impression


.


IMPRESSION:


1.  Acute hypoxemic respiratory failure secondary to COVID-19 viral 

pneumonia./Acute lung injury and early ARDS.  Slowly improving


2.  COVID-19 viral pneumonia.


3.  Abnormal chest x-ray, compatible with viral pneumonia.


4.  Nonspecific anxiety, possible clinical depression





Plan


.


Updated 8/6/21


Continue supplemental oxygen to keep sats above 92% on BIPAP 70%, monitor 

respiratory status closely


S/P remdesivir


Continue steroids currently on 60 mg IV twice daily, with slow taper, will need 

full 10-day course, started on 7/27/2021,taper


PT/OT


DVT/GI PPX :lovenox 


D/W RN and RT


Patient willing to try Vapotherm.  Discussed with RN.





Updated 8/5/21


Continue supplemental oxygen to keep sats above 92% on BIPAP 70%, monitor 

respiratory status closely


S/P remdesivir


Continue steroids currently on 60 mg IV twice daily, with slow taper, will need 

full 10-day course, started on 7/27/2021


PT/OT


DVT/GI PPX :lovenox 


D/W RN and RT


Awaiting LTAC transfer





Updated 8/4/21


Continue supplemental oxygen to keep sats above 92% on BIPAP 70%, monitor 

respiratory status closely


S/P remdesivir


Continue steroids currently on 60 mg IV twice daily, with slow taper, will need 

full 10-day course, started on 7/27/2021


PT/OT


DVT/GI PPX :lovenox 


D/W RN and RT








Updated 8/3/21


Continue supplemental oxygen to keep sats above 92% on BIPAP 75%, monitor re

spiratory status closely


S/P remdesivir


Continue steroids currently on 60 mg IV twice daily, with slow taper, will need 

full 10-day course, started on 7/27/2021


PT/OT


DVT/GI PPX :lovenox 


D/W RN and RT





Updated 8/2/21


Continue supplemental oxygen to keep sats above 92% on BIPAP 75%, monitor 

respiratory status closely


S/P remdesivir


Continue steroids 


Off ABX and ID has signed off


PT/OT


DVT/GI PPX :lovenox 


D/W RN and RT








Updated 8/1


Continue BiPAP at 75%, decrease FiO2 as tolerated


Stressed the importance of sitting up in a chair,


Continue remdesivir, steroids,


As needed Haldol for anxiety


Discussed case with wife per telephone, updated her, answered all questions.











ASHLEY YEAGER MD                  Aug 6, 2021 10:12

## 2021-08-06 NOTE — NUR
SS following up with discharge planning. SS reviewed pt chart and discussed with pt RN. Pt 
is currently on Vapotherm at 100%. COVID19 positive. Pt on IV Solu-Medrol. LTACH referral 
recommended. Pt accepted at [facility], [phone]; fax [phone], pending 
insurance authorization. SS phoned and faxed clinical updates to [facility]. SS will continue 
to follow for discharge planning.  

-------------------------------------------------------------------------------

Addendum: 08/06/21 at 1633 by KASEY REARDON SS

-------------------------------------------------------------------------------

[employer] contacted [facility] and denied LTACH. Alin from [facility] awaiting denial 
letter with peer to peer information and will fax to [facility]. Pt's RN notified.

## 2021-08-06 NOTE — PDOC
[employer] PROGRESS NOTE


Date of Service


DOS:


DATE: 8/6/21 


TIME: 11:00





Chief Complaint


Chief Complaint


A/P:


SEPSIS, was POA


Severe malnutrition was POA, with obesity, BMI 32


Acute hypoxic respiratory failure


COVID-19 pneumonia requiring BiPAP


transaminitis, 


 


 iv fluid started





Continue IV Remdesivir


Continue IV thiamine and vitamin C


Continue IV steroids


Pending ferritin, LDH, CRP, D-dimer labs


Titrate O2 supplementation to maintain O2 saturation greater than 92%


Lovenox for DVT prophylaxis





History of Present Illness


History of Present Illness


Mr Read is a 56-year-old male with no significant past medical history except 

for degenerative joint disease who was transferred from [facility]  

with complaints of fever, headache, dry cough, shortness of breath for the past 

3 days, admitted on 7/26/2021.  Patient stated that he was actually exposed from

his son he was worsening and required to come to the ED at [facility] and he was

found to be hypoxic and required 2 L of nasal cannula oxygen.  Chest x-ray over 

there showed bilateral mild bibasilar infiltrates.  Covid test was positive and 

patient was transferred to [facility] for pulmonary evaluation and treatment.  Patient 

was started on Remdesivir and IV steroids.  Currently patient is requiring 15 L 

high flow nasal cannula and IV steroids.





7/30: No acute events overnight.  Patient was saturating 92% on 15 L nasal 

cannula and he had desatted down to 70% after he had to blow his nose.  Patient 

was placed on BiPAP at 100% FiO2.  Chest leg appears to be worsening.


7/31: Last dose of remdesivir. Could not tolerate vapotherm and is now on BIPAP.


8/1:  poor Po intake, dry,   dark urine


8/2: Desaturates to 72% when he takes off his BiPAP.  Advised to get back on 

BIPAP after eating. he is able to place himself back on. Frustrated with the 

severity of his hypoxia.


8/3: Status post remdesivir. Still requiring BiPAP 75% FiO2 with significant 

desaturations when he is not wearing assessment with nonrebreather O2.


8/4: Still with significant desaturations when he raises BiPAP even on 15 L high

flow nasal cannula.  Discussed transitioning back to Vapotherm if we can treat 

his anxiety.  He is amenable to this.  He is frustrated with staying in the 

hospital understands he may be a longer term COVID recovery and is amenable to 

LTACH referral.  I discussed with pulmonology.


8/5: Afebrile. 70% FiO2 on BiPAP 12/6.  Feels better.  Did not tolerated Vapot

herm.  Significant desaturations when he transitions to high flow nasal cannula 

O2 while eating.  Discussed referral to LTACH with pulmonology and ID as it 

appears he will need a longer COVID recovery.





Patient on 70% FiO2.  Via BiPAP 12/6 when seen by pulmonology. Feels better and 

is seen on vapotherm 40l/min 100% FiO2 with saturations 89%, tolerating well.





Vitals/I&O


Vitals/I&O:





                                   Vital Signs








  Date Time  Temp Pulse Resp B/P (MAP) Pulse Ox O2 Delivery O2 Flow Rate FiO2


 


8/6/21 09:21     83 NonRebreather Mask 15.0 


 


8/6/21 07:00 97.1 86 18 142/96 (111)    





 97.1       














                                    I & O   


 


 8/5/21 8/5/21 8/6/21





 15:00 23:00 07:00


 


Intake Total 400 ml  300 ml


 


Output Total 1450 ml 1500 ml 


 


Balance -1050 ml -1500 ml 300 ml











Physical Exam


Physical Exam:








GEN axo3 male on BIPAP


HEENT:  Normocephalic, atraumatic.  Anicteric. 


NECK:  Supple, no JVD.


LUNGS:  Crackles bilaterally.   


HEART:  S1, S2.  


ABDOMEN:  Soft, nontender, nondistended.  Bowel sounds present.


EXTREMITIES:  No edema, no cyanosis.


DERMATOLOGIC:  Warm, dry, no generalized rash.


NEUROLOGIC:  Alert, oriented, grossly nonfocal.


PSYCHIATRIC:  Calm and cooperative. 


 PIV looks clean.


General:  Alert, mild distress


Heart:  Regular rate (tele reg,  reviewed)


Extremities:  No clubbing





Comment


Review of Relevant


I have reviewed the following items genesis (where applicable) has been applied.





Justifications for Admission


Other Justification














TYE AMAYA MD         Aug 6, 2021 11:02

## 2021-08-07 VITALS — DIASTOLIC BLOOD PRESSURE: 77 MMHG | SYSTOLIC BLOOD PRESSURE: 130 MMHG

## 2021-08-07 VITALS — DIASTOLIC BLOOD PRESSURE: 79 MMHG | SYSTOLIC BLOOD PRESSURE: 146 MMHG

## 2021-08-07 VITALS — SYSTOLIC BLOOD PRESSURE: 109 MMHG | DIASTOLIC BLOOD PRESSURE: 61 MMHG

## 2021-08-07 VITALS — DIASTOLIC BLOOD PRESSURE: 80 MMHG | SYSTOLIC BLOOD PRESSURE: 132 MMHG

## 2021-08-07 VITALS — SYSTOLIC BLOOD PRESSURE: 119 MMHG | DIASTOLIC BLOOD PRESSURE: 78 MMHG

## 2021-08-07 RX ADMIN — METHYLPREDNISOLONE SODIUM SUCCINATE SCH MG: 40 INJECTION, POWDER, FOR SOLUTION INTRAMUSCULAR; INTRAVENOUS at 18:22

## 2021-08-07 RX ADMIN — ACETAMINOPHEN PRN MG: 325 TABLET, FILM COATED ORAL at 06:41

## 2021-08-07 RX ADMIN — IPRATROPIUM BROMIDE AND ALBUTEROL SCH PUFF: 20; 100 SPRAY, METERED RESPIRATORY (INHALATION) at 11:12

## 2021-08-07 RX ADMIN — Medication SCH MG: at 11:01

## 2021-08-07 RX ADMIN — CHOLECALCIFEROL CAP 125 MCG (5000 UNIT) SCH UNIT: 125 CAP at 11:01

## 2021-08-07 RX ADMIN — IPRATROPIUM BROMIDE AND ALBUTEROL SCH PUFF: 20; 100 SPRAY, METERED RESPIRATORY (INHALATION) at 20:00

## 2021-08-07 RX ADMIN — FAMOTIDINE SCH MG: 20 TABLET ORAL at 21:41

## 2021-08-07 RX ADMIN — ZINC SULFATE CAP 220 MG (50 MG ELEMENTAL ZN) SCH MG: 220 (50 ZN) CAP at 11:01

## 2021-08-07 RX ADMIN — IPRATROPIUM BROMIDE AND ALBUTEROL SCH PUFF: 20; 100 SPRAY, METERED RESPIRATORY (INHALATION) at 08:00

## 2021-08-07 RX ADMIN — CETIRIZINE HYDROCHLORIDE SCH MG: 10 TABLET, FILM COATED ORAL at 11:01

## 2021-08-07 RX ADMIN — ENOXAPARIN SODIUM SCH MG: 40 INJECTION SUBCUTANEOUS at 21:40

## 2021-08-07 RX ADMIN — METHYLPREDNISOLONE SODIUM SUCCINATE SCH MG: 40 INJECTION, POWDER, FOR SOLUTION INTRAMUSCULAR; INTRAVENOUS at 06:00

## 2021-08-07 RX ADMIN — ACETAMINOPHEN PRN MG: 325 TABLET, FILM COATED ORAL at 21:40

## 2021-08-07 RX ADMIN — IPRATROPIUM BROMIDE AND ALBUTEROL SCH PUFF: 20; 100 SPRAY, METERED RESPIRATORY (INHALATION) at 16:00

## 2021-08-07 NOTE — PDOC
PULMONARY PROGRESS NOTES


DATE: 8/7/21 


TIME: 10:02


Subjective


Patient is back on Vapotherm.  At 100% FiO2 and 40 L and tolerating well.


No increased shortness of breath or cough


Afebrile


Vitals





Vital Signs








  Date Time  Temp Pulse Resp B/P (MAP) Pulse Ox O2 Delivery O2 Flow Rate FiO2


 


8/7/21 07:34     94 VAPOTHERM 40.0 


 


8/7/21 02:04 96.4 63  130/77 (94)    





 96.4       


 


8/6/21 22:31   19     








Comments


Pt. seen during covid -19 pandemic visual exam preformed 


no distress 


no obvious rash or edema


Medications





Active Scripts








 Medications  Dose


 Route/Sig


 Max Daily Dose Days Date Category Dose


Instructions


 


 Morphine Sulfate


 2 Mg/1 Ml


 Cartridge  2 Mg


 IV PRN Q4HRS PRN


   7/27/21 Reported 


 


 Zinc Sulfate 50


 Mg Tablet  220 Mg


 PO DAILY


   7/27/21 Reported 


 


 Methylprednisolone


 Sod Succ 125 Mg


 Vial  60 Mg


 IJ Q8HRS


   7/27/21 Reported 


 


 Combivent


 Respimat Inhal


  (Ipratropium/Albuterol


 Sulfate) 4 Gm


 Aer.w.adap  2 Inh


 IH QID


   7/27/21 Reported 


 


 Pepcid


  (Famotidine) 20


 Mg Tablet  20 Mg


 PO HS


   7/27/21 Reported 


 


 Lovenox


  (Enoxaparin


 Sodium) 30 Mg/0.3


 Ml Disp.syrin  30 Mg


 SQ DAILY


   7/27/21 Reported 


 


 Vitamin D3 **


  (Vitamin D) 125


 Mcg Capsule  125 Mcg


 PO DAILY


   7/27/21 Reported  5,000 UNITS = 125 MCG


 


 Zyrtec


  (Cetirizine Hcl)


 10 Mg Tablet  1 Tab


 PO DAILY


   7/27/21 Reported 


 


 Ascorbic Acid 500


 Mg Tablet  2 Mg


 PO DAILY


   7/27/21 Reported 


 


 Acetaminophen 325


 Mg Tablet  2 Tab


 PO PRN Q6HRS PRN


  30 7/27/21 Reported 


 


 No Known


 Medications Prior


 To Admisstion


  (Info)  Each  1 Each


 


   8/25/20 Reported 











Impression


.


IMPRESSION:


1.  Acute hypoxemic respiratory failure secondary to COVID-19 viral 

pneumonia./Acute lung injury and early ARDS.  Slowly improving


2.  COVID-19 viral pneumonia.


3.  Abnormal chest x-ray, compatible with viral pneumonia.


4.  Nonspecific anxiety, possible clinical depression





Plan


.


Updated 8/7/21


Continue supplemental oxygen to keep sats above 92% currently back on Vapotherm 

and tolerating well.  100% FiO2 and 40 L flow.


S/P remdesivir


Continue steroids with taper


Off antibiotics.


PT/OT


DVT/GI PPX :lovenox 


D/W RN and RT








Updated 8/6/21


Continue supplemental oxygen to keep sats above 92% on BIPAP 70%, monitor 

respiratory status closely


S/P remdesivir


Continue steroids currently on 60 mg IV twice daily, with slow taper, will need 

full 10-day course, started on 7/27/2021,taper


PT/OT


DVT/GI PPX :lovenox 


D/W RN and RT


Patient willing to try Vapotherm.  Discussed with RN.





Updated 8/5/21


Continue supplemental oxygen to keep sats above 92% on BIPAP 70%, monitor 

respiratory status closely


S/P remdesivir


Continue steroids currently on 60 mg IV twice daily, with slow taper, will need 

full 10-day course, started on 7/27/2021


PT/OT


DVT/GI PPX :lovenox 


D/W RN and RT


Awaiting LTAC transfer





Updated 8/4/21


Continue supplemental oxygen to keep sats above 92% on BIPAP 70%, monitor 

respiratory status closely


S/P remdesivir


Continue steroids currently on 60 mg IV twice daily, with slow taper, will need 

full 10-day course, started on 7/27/2021


PT/OT


DVT/GI PPX :lovenox 


D/W RN and RT








Updated 8/3/21


Continue supplemental oxygen to keep sats above 92% on BIPAP 75%, monitor 

respiratory status closely


S/P remdesivir


Continue steroids currently on 60 mg IV twice daily, with slow taper, will need 

full 10-day course, started on 7/27/2021


PT/OT


DVT/GI PPX :lovenox 


D/W RN and RT





Updated 8/2/21


Continue supplemental oxygen to keep sats above 92% on BIPAP 75%, monitor 

respiratory status closely


S/P remdesivir


Continue steroids 


Off ABX and ID has signed off


PT/OT


DVT/GI PPX :lovenox 


D/W RN and RT








Updated 8/1


Continue BiPAP at 75%, decrease FiO2 as tolerated


Stressed the importance of sitting up in a chair,


Continue remdesivir, steroids,


As needed Haldol for anxiety


Discussed case with wife per telephone, updated her, answered all questions.











ASHLEY YEAGER MD                  Aug 7, 2021 10:04

## 2021-08-07 NOTE — RAD
EXAM: AP View of the chest



DATE: 8/7/2021 10:14 AM



INDICATION: Reason: COVID 19 Poor progression, concern for secondary process / Spl. Instructions:  / 
History: 



COMPARISON: 7/29/2021



FINDINGS/

IMPRESSION:

The heart is not enlarged.



Mediastinal and hilar contours are stable.



Diffuse bilateral parenchymal airspace opacities have progressed compared to 7/29/2021.



No pleural effusion or pneumothorax.







Electronically signed by: Galen Ponce MD (8/7/2021 11:21 AM) [id number]

## 2021-08-07 NOTE — NUR
Pt in bed assessment completed vss poc explained pt denied pain at time of assessment will 
resume care and continue to monitor pt. Call light in reach.

## 2021-08-07 NOTE — PDOC
[employer] PROGRESS NOTE


Date of Service


DOS:


DATE: 8/7/21 


TIME: 08:55





Chief Complaint


Chief Complaint


A/P:


SEPSIS, was POA


Severe malnutrition was POA, with obesity, BMI 32


Acute hypoxic respiratory failure


COVID-19 pneumonia requiring BiPAP --> Vapotherm


transaminitis, 


 


Completed IV Remdesivir


Continue IV thiamine and vitamin C


Continue IV steroids


Titrate O2 supplementation to maintain O2 saturation greater than 92%


Lovenox for DVT prophylaxis





History of Present Illness


History of Present Illness


Mr Read is a 56-year-old male with no significant past medical history except 

for degenerative joint disease who was transferred from [facility]  

with complaints of fever, headache, dry cough, shortness of breath for the past 

3 days, admitted on 7/26/2021.  Patient stated that he was actually exposed from

his son he was worsening and required to come to the ED at [facility] and he was

found to be hypoxic and required 2 L of nasal cannula oxygen.  Chest x-ray over 

there showed bilateral mild bibasilar infiltrates.  Covid test was positive and 

patient was transferred to [facility] for pulmonary evaluation and treatment.  Patient 

was started on Remdesivir and IV steroids.  Currently patient is requiring 15 L 

high flow nasal cannula and IV steroids.





7/30: No acute events overnight.  Patient was saturating 92% on 15 L nasal 

cannula and he had desatted down to 70% after he had to blow his nose.  Patient 

was placed on BiPAP at 100% FiO2.  Chest leg appears to be worsening.


7/31: Last dose of remdesivir. Could not tolerate vapotherm and is now on BIPAP.


8/1:  poor Po intake, dry,   dark urine


8/2: Desaturates to 72% when he takes off his BiPAP.  Advised to get back on 

BIPAP after eating. he is able to place himself back on. Frustrated with the 

severity of his hypoxia.


8/3: Status post remdesivir. Still requiring BiPAP 75% FiO2 with significant 

desaturations when he is not wearing assessment with nonrebreather O2.


8/4: Still with significant desaturations when he raises BiPAP even on 15 L high

flow nasal cannula.  Discussed transitioning back to Vapotherm if we can treat 

his anxiety.  He is amenable to this.  He is frustrated with staying in the 

hospital understands he may be a longer term COVID recovery and is amenable to 

LTACH referral.  I discussed with pulmonology.


8/5: Afebrile. 70% FiO2 on BiPAP 12/6.  Feels better.  Did not tolerated 

Vapotherm.  Significant desaturations when he transitions to high flow nasal 

cannula O2 while eating.  Discussed referral to LTACH with pulmonology and ID as

it appears he will need a longer COVID recovery.


8/6: Patient on 70% FiO2.  Via BiPAP 12/6 when seen by pulmonology. Feels better

and is seen on vapotherm 40l/min 100% FiO2 with saturations 89%, tolerating 

well.





Transition to Vapotherm well.  He is on 40 L/min of 100% FiO2 with O2 

saturations 89% to 91%.  He is asking if he can try proning today.  Still with 

significant cough of white frothy sputum.





Plan:


Repeat labs, check NT-proBNP and f/u CXR





Vitals/I&O


Vitals/I&O:





                                   Vital Signs








  Date Time  Temp Pulse Resp B/P (MAP) Pulse Ox O2 Delivery O2 Flow Rate FiO2


 


8/7/21 07:34     94 VAPOTHERM 40.0 


 


8/7/21 02:04 96.4 63  130/77 (94)    





 96.4       


 


8/6/21 22:31   19     














                                    I & O   


 


 8/6/21 8/6/21 8/7/21





 15:00 23:00 07:00


 


Intake Total 760 ml 380 ml 0 ml


 


Output Total 1350 ml  1250 ml


 


Balance -590 ml 380 ml -1250 ml











Physical Exam


Physical Exam:








GEN axo3 male on BIPAP


HEENT:  Normocephalic, atraumatic.  Anicteric. 


NECK:  Supple, no JVD.


LUNGS:  Crackles bilaterally.   


HEART:  S1, S2.  


ABDOMEN:  Soft, nontender, nondistended.  Bowel sounds present.


EXTREMITIES:  No edema, no cyanosis.


DERMATOLOGIC:  Warm, dry, no generalized rash.


NEUROLOGIC:  Alert, oriented, grossly nonfocal.


PSYCHIATRIC:  Calm and cooperative. 


 PIV looks clean.


General:  Alert, mild distress


Heart:  Regular rate (tele reg,  reviewed)


Extremities:  No clubbing





Comment


Review of Relevant


I have reviewed the following items genesis (where applicable) has been applied.


Medications:





Current Medications








 Medications


  (Trade)  Dose


 Ordered  Sig/Shayy


 Route


 PRN Reason  Start Time


 Stop Time Status Last Admin


Dose Admin


 


 Methylprednisolone


 Sodium Succinate


  (SOLU-Medrol


 40MG VIAL)  60 mg  [id number]


 IV


   8/6/21 18:00


    8/7/21 06:00














Justifications for Admission


Other Justification














TYE AMAYA MD         Aug 7, 2021 08:56

## 2021-08-08 VITALS — DIASTOLIC BLOOD PRESSURE: 81 MMHG | SYSTOLIC BLOOD PRESSURE: 130 MMHG

## 2021-08-08 VITALS — DIASTOLIC BLOOD PRESSURE: 73 MMHG | SYSTOLIC BLOOD PRESSURE: 138 MMHG

## 2021-08-08 VITALS — DIASTOLIC BLOOD PRESSURE: 68 MMHG | SYSTOLIC BLOOD PRESSURE: 118 MMHG

## 2021-08-08 VITALS — DIASTOLIC BLOOD PRESSURE: 84 MMHG | SYSTOLIC BLOOD PRESSURE: 139 MMHG

## 2021-08-08 VITALS — SYSTOLIC BLOOD PRESSURE: 129 MMHG | DIASTOLIC BLOOD PRESSURE: 84 MMHG

## 2021-08-08 VITALS — DIASTOLIC BLOOD PRESSURE: 79 MMHG | SYSTOLIC BLOOD PRESSURE: 134 MMHG

## 2021-08-08 LAB
ALBUMIN SERPL-MCNC: 2.2 G/DL (ref 3.4–5)
ALBUMIN/GLOB SERPL: 0.6 {RATIO} (ref 1–1.7)
ALP SERPL-CCNC: 61 U/L (ref 46–116)
ALT SERPL-CCNC: 122 U/L (ref 16–63)
ANION GAP SERPL CALC-SCNC: 3 MMOL/L (ref 6–14)
AST SERPL-CCNC: 27 U/L (ref 15–37)
BASOPHILS # BLD AUTO: 0 X10^3/UL (ref 0–0.2)
BASOPHILS NFR BLD: 0 % (ref 0–3)
BILIRUB SERPL-MCNC: 0.6 MG/DL (ref 0.2–1)
BUN SERPL-MCNC: 23 MG/DL (ref 8–26)
BUN/CREAT SERPL: 29 (ref 6–20)
CALCIUM SERPL-MCNC: 8.4 MG/DL (ref 8.5–10.1)
CHLORIDE SERPL-SCNC: 104 MMOL/L (ref 98–107)
CO2 SERPL-SCNC: 32 MMOL/L (ref 21–32)
CREAT SERPL-MCNC: 0.8 MG/DL (ref 0.7–1.3)
EOSINOPHIL NFR BLD: 0 % (ref 0–3)
EOSINOPHIL NFR BLD: 0 X10^3/UL (ref 0–0.7)
ERYTHROCYTE [DISTWIDTH] IN BLOOD BY AUTOMATED COUNT: 12.8 % (ref 11.5–14.5)
GFR SERPLBLD BASED ON 1.73 SQ M-ARVRAT: 100 ML/MIN
GLUCOSE SERPL-MCNC: 133 MG/DL (ref 70–99)
HCT VFR BLD CALC: 45 % (ref 39–53)
HGB BLD-MCNC: 15.3 G/DL (ref 13–17.5)
LYMPHOCYTES # BLD: 0.3 X10^3/UL (ref 1–4.8)
LYMPHOCYTES NFR BLD AUTO: 2 % (ref 24–48)
MCH RBC QN AUTO: 32 PG (ref 25–35)
MCHC RBC AUTO-ENTMCNC: 34 G/DL (ref 31–37)
MCV RBC AUTO: 93 FL (ref 79–100)
MONO #: 0.5 X10^3/UL (ref 0–1.1)
MONOCYTES NFR BLD: 4 % (ref 0–9)
NEUT #: 13.2 X10^3/UL (ref 1.8–7.7)
NEUTROPHILS NFR BLD AUTO: 94 % (ref 31–73)
PLATELET # BLD AUTO: 126 X10^3/UL (ref 140–400)
POTASSIUM SERPL-SCNC: 4.6 MMOL/L (ref 3.5–5.1)
PROT SERPL-MCNC: 5.6 G/DL (ref 6.4–8.2)
RBC # BLD AUTO: 4.84 X10^6/UL (ref 4.3–5.7)
SODIUM SERPL-SCNC: 139 MMOL/L (ref 136–145)
WBC # BLD AUTO: 14 X10^3/UL (ref 4–11)

## 2021-08-08 RX ADMIN — METHYLPREDNISOLONE SODIUM SUCCINATE SCH MG: 40 INJECTION, POWDER, FOR SOLUTION INTRAMUSCULAR; INTRAVENOUS at 05:37

## 2021-08-08 RX ADMIN — Medication SCH MG: at 11:17

## 2021-08-08 RX ADMIN — FAMOTIDINE SCH MG: 20 TABLET ORAL at 20:44

## 2021-08-08 RX ADMIN — WATER PRN ML: 1 INJECTION INTRAVENOUS at 20:44

## 2021-08-08 RX ADMIN — CHOLECALCIFEROL CAP 125 MCG (5000 UNIT) SCH UNIT: 125 CAP at 11:17

## 2021-08-08 RX ADMIN — IPRATROPIUM BROMIDE AND ALBUTEROL SCH PUFF: 20; 100 SPRAY, METERED RESPIRATORY (INHALATION) at 20:43

## 2021-08-08 RX ADMIN — ZINC SULFATE CAP 220 MG (50 MG ELEMENTAL ZN) SCH MG: 220 (50 ZN) CAP at 11:17

## 2021-08-08 RX ADMIN — IPRATROPIUM BROMIDE AND ALBUTEROL SCH PUFF: 20; 100 SPRAY, METERED RESPIRATORY (INHALATION) at 12:00

## 2021-08-08 RX ADMIN — IPRATROPIUM BROMIDE AND ALBUTEROL SCH PUFF: 20; 100 SPRAY, METERED RESPIRATORY (INHALATION) at 08:00

## 2021-08-08 RX ADMIN — WATER PRN ML: 1 INJECTION INTRAVENOUS at 01:15

## 2021-08-08 RX ADMIN — IPRATROPIUM BROMIDE AND ALBUTEROL SCH PUFF: 20; 100 SPRAY, METERED RESPIRATORY (INHALATION) at 16:00

## 2021-08-08 RX ADMIN — WATER PRN ML: 1 INJECTION INTRAVENOUS at 11:40

## 2021-08-08 RX ADMIN — CETIRIZINE HYDROCHLORIDE SCH MG: 10 TABLET, FILM COATED ORAL at 11:17

## 2021-08-08 RX ADMIN — ENOXAPARIN SODIUM SCH MG: 40 INJECTION SUBCUTANEOUS at 20:43

## 2021-08-08 RX ADMIN — METHYLPREDNISOLONE SODIUM SUCCINATE SCH MG: 40 INJECTION, POWDER, FOR SOLUTION INTRAMUSCULAR; INTRAVENOUS at 18:19

## 2021-08-08 NOTE — NUR
PT IN BED AWAKE ASSESSMENT COMPLETED VSS POC EXPLAINED PT DENIED PAIN WILL RESUME CARE AND 
CONTINUE TO MONITOR PT.

## 2021-08-08 NOTE — PDOC
[employer] PROGRESS NOTE


Date of Service


DOS:


DATE: 8/8/21 


TIME: 13:57





Chief Complaint


Chief Complaint


SEPSIS, was POA


Severe malnutrition was POA, with obesity, BMI 32


Acute hypoxic respiratory failure


COVID-19 pneumonia r


transaminitis





History of Present Illness


History of Present Illness


Mr Read is a 56-year-old male with no significant past medical history except 

for degenerative joint disease who was transferred from [facility]  

with complaints of fever, headache, dry cough, shortness of breath for the past 

3 days, admitted on 7/26/2021.  Patient stated that he was actually exposed from

his son he was worsening and required to come to the ED at [facility] and he was

found to be hypoxic and required 2 L of nasal cannula oxygen.  Chest x-ray over 

there showed bilateral mild bibasilar infiltrates.  Covid test was positive and 

patient was transferred to [facility] for pulmonary evaluation and treatment.  Patient 

was started on Remdesivir and IV steroids.  Currently patient is requiring 15 L 

high flow nasal cannula and IV steroids.





7/30: No acute events overnight.  Patient was saturating 92% on 15 L nasal 

cannula and he had desatted down to 70% after he had to blow his nose.  Patient 

was placed on BiPAP at 100% FiO2.  Chest leg appears to be worsening.


7/31: Last dose of remdesivir. Could not tolerate vapotherm and is now on BIPAP.


8/1:  poor Po intake, dry,   dark urine


8/2: Desaturates to 72% when he takes off his BiPAP.  Advised to get back on 

BIPAP after eating. he is able to place himself back on. Frustrated with the 

severity of his hypoxia.


8/3: Status post remdesivir. Still requiring BiPAP 75% FiO2 with significant 

desaturations when he is not wearing assessment with nonrebreather O2.


8/4: Still with significant desaturations when he raises BiPAP even on 15 L high

flow nasal cannula.  Discussed transitioning back to Vapotherm if we can treat 

his anxiety.  He is amenable to this.  He is frustrated with staying in the 

hospital understands he may be a longer term COVID recovery and is amenable to 

LTACH referral.  I discussed with pulmonology.


8/5: Afebrile. 70% FiO2 on BiPAP 12/6.  Feels better.  Did not tolerated 

Vapotherm.  Significant desaturations when he transitions to high flow nasal 

cannula O2 while eating.  Discussed referral to LTACH with pulmonology and ID as

it appears he will need a longer COVID recovery.


8/6: Patient on 70% FiO2.  Via BiPAP 12/6 when seen by pulmonology. Feels better

and is seen on vapotherm 40l/min 100% FiO2 with saturations 89%, tolerating 

well.





8/8/2021


patient seen at edge of bed. FiO2 at 89%. Dropped to low 80s while talking to 

us. Educated patient on conserving energy and working to keep FiO2 higher.


D/W RN


Chart Reviewed





Vitals/I&O


Vitals/I&O:





                                   Vital Signs








  Date Time  Temp Pulse Resp B/P (MAP) Pulse Ox O2 Delivery O2 Flow Rate FiO2


 


8/8/21 11:40     92 VAPOTHERM 40.0 


 


8/8/21 11:00 98.3 89 18 138/73 (94)    





 98.3       














                                    I & O   


 


 8/7/21 8/7/21 8/8/21





 15:00 23:00 07:00


 


Intake Total 580 ml 760 ml 300 ml


 


Output Total 450 ml 600 ml 950 ml


 


Balance 130 ml 160 ml -650 ml











Physical Exam


Physical Exam:








GEN axo3 male on BIPAP


HEENT:  Normocephalic, atraumatic.  Anicteric. 


NECK:  Supple, no JVD.


LUNGS:  Crackles bilaterally.   


HEART:  S1, S2.  


ABDOMEN:  Soft, nontender, nondistended.  Bowel sounds present.


EXTREMITIES:  No edema, no cyanosis.


DERMATOLOGIC:  Warm, dry, no generalized rash.


NEUROLOGIC:  Alert, oriented, grossly nonfocal.


PSYCHIATRIC:  Calm and cooperative. 


 PIV looks clean.


General:  Alert, mild distress


Heart:  Regular rate (tele reg,  reviewed)


Abdomen:  Normal bowel sounds


Extremities:  No clubbing


Skin:  No rashes





Labs


Labs:





Laboratory Tests








Test


 8/8/21


04:30


 


White Blood Count


 14.0 x10^3/uL


(4.0-11.0)


 


Red Blood Count


 4.84 x10^6/uL


(4.30-5.70)


 


Hemoglobin


 15.3 g/dL


(13.0-17.5)


 


Hematocrit


 45.0 %


(39.0-53.0)


 


Mean Corpuscular Volume 93 fL () 


 


Mean Corpuscular Hemoglobin 32 pg (25-35) 


 


Mean Corpuscular Hemoglobin


Concent 34 g/dL


(31-37)


 


Red Cell Distribution Width


 12.8 %


(11.5-14.5)


 


Platelet Count


 126 x10^3/uL


(140-400)


 


Neutrophils (%) (Auto) 94 % (31-73) 


 


Lymphocytes (%) (Auto) 2 % (24-48) 


 


Monocytes (%) (Auto) 4 % (0-9) 


 


Eosinophils (%) (Auto) 0 % (0-3) 


 


Basophils (%) (Auto) 0 % (0-3) 


 


Neutrophils # (Auto)


 13.2 x10^3/uL


(1.8-7.7)


 


Lymphocytes # (Auto)


 0.3 x10^3/uL


(1.0-4.8)


 


Monocytes # (Auto)


 0.5 x10^3/uL


(0.0-1.1)


 


Eosinophils # (Auto)


 0.0 x10^3/uL


(0.0-0.7)


 


Basophils # (Auto)


 0.0 x10^3/uL


(0.0-0.2)


 


Sodium Level


 139 mmol/L


(136-145)


 


Potassium Level


 4.6 mmol/L


(3.5-5.1)


 


Chloride Level


 104 mmol/L


()


 


Carbon Dioxide Level


 32 mmol/L


(21-32)


 


Anion Gap 3 (6-14) 


 


Blood Urea Nitrogen


 23 mg/dL


(8-26)


 


Creatinine


 0.8 mg/dL


(0.7-1.3)


 


Estimated GFR


(Cockcroft-Gault) 100.0 





 


BUN/Creatinine Ratio 29 (6-20) 


 


Glucose Level


 133 mg/dL


(70-99)


 


Calcium Level


 8.4 mg/dL


(8.5-10.1)


 


Total Bilirubin


 0.6 mg/dL


(0.2-1.0)


 


Aspartate Amino Transf


(AST/SGOT) 27 U/L (15-37) 





 


Alanine Aminotransferase


(ALT/SGPT) 122 U/L


(16-63)


 


Alkaline Phosphatase


 61 U/L


()


 


NT-Pro-B-Type Natriuretic


Peptide 135 pg/mL


(0-124)


 


Total Protein


 5.6 g/dL


(6.4-8.2)


 


Albumin


 2.2 g/dL


(3.4-5.0)


 


Albumin/Globulin Ratio 0.6 (1.0-1.7) 











Review of Systems


Review of Systems:


bowel and bladder continent


denies HA or dizziness


denies fever or chills





Assessment and Plan


Assessmemt and Plan


SEPSIS, was POA


Severe malnutrition was POA, with obesity, BMI 32


Acute hypoxic respiratory failure


COVID-19 pneumonia requiring 


transaminitis, 


 


Plan


Continue COVID protocol


Completed IV Remdesivir


Continue IV thiamine and vitamin C


Continue IV steroids


Titrate O2 supplementation to maintain O2 saturation greater than 92%


Lovenox for DVT prophylaxis


Trend labs


PT/OT


anoxic encephalopathy


Full code


Cardiac monitoring


Pulmonary following


Infectious disease following


Appreciate subspecialist input


Discussed with RN


Chart reviewed


Prognosis guarded


CC time 31 minutes





Comment


Review of Relevant


I have reviewed the following items genesis (where applicable) has been applied.





Justifications for Admission


Other Justification














SHIRIN SCHNEIDER III DO            Aug 8, 2021 14:03

## 2021-08-08 NOTE — PDOC
PULMONARY PROGRESS NOTES


DATE: 8/8/21 


TIME: 12:12


Subjective


Patient is on Vapotherm.  At 100% FiO2 and 40 L and tolerating well.


No increased shortness of breath or cough


Afebrile


Vitals





Vital Signs








  Date Time  Temp Pulse Resp B/P (MAP) Pulse Ox O2 Delivery O2 Flow Rate FiO2


 


8/8/21 11:40     92 VAPOTHERM 40.0 


 


8/8/21 07:00 97.5 80 18 130/81 (97)    





 97.5       








Comments


Pt. seen during covid -19 pandemic visual exam preformed 


no distress 


no obvious rash or edema


Labs





Laboratory Tests








Test


 8/8/21


04:30


 


White Blood Count


 14.0 x10^3/uL


(4.0-11.0)


 


Red Blood Count


 4.84 x10^6/uL


(4.30-5.70)


 


Hemoglobin


 15.3 g/dL


(13.0-17.5)


 


Hematocrit


 45.0 %


(39.0-53.0)


 


Mean Corpuscular Volume 93 fL () 


 


Mean Corpuscular Hemoglobin 32 pg (25-35) 


 


Mean Corpuscular Hemoglobin


Concent 34 g/dL


(31-37)


 


Red Cell Distribution Width


 12.8 %


(11.5-14.5)


 


Platelet Count


 126 x10^3/uL


(140-400)


 


Neutrophils (%) (Auto) 94 % (31-73) 


 


Lymphocytes (%) (Auto) 2 % (24-48) 


 


Monocytes (%) (Auto) 4 % (0-9) 


 


Eosinophils (%) (Auto) 0 % (0-3) 


 


Basophils (%) (Auto) 0 % (0-3) 


 


Neutrophils # (Auto)


 13.2 x10^3/uL


(1.8-7.7)


 


Lymphocytes # (Auto)


 0.3 x10^3/uL


(1.0-4.8)


 


Monocytes # (Auto)


 0.5 x10^3/uL


(0.0-1.1)


 


Eosinophils # (Auto)


 0.0 x10^3/uL


(0.0-0.7)


 


Basophils # (Auto)


 0.0 x10^3/uL


(0.0-0.2)


 


Sodium Level


 139 mmol/L


(136-145)


 


Potassium Level


 4.6 mmol/L


(3.5-5.1)


 


Chloride Level


 104 mmol/L


()


 


Carbon Dioxide Level


 32 mmol/L


(21-32)


 


Anion Gap 3 (6-14) 


 


Blood Urea Nitrogen


 23 mg/dL


(8-26)


 


Creatinine


 0.8 mg/dL


(0.7-1.3)


 


Estimated GFR


(Cockcroft-Gault) 100.0 





 


BUN/Creatinine Ratio 29 (6-20) 


 


Glucose Level


 133 mg/dL


(70-99)


 


Calcium Level


 8.4 mg/dL


(8.5-10.1)


 


Total Bilirubin


 0.6 mg/dL


(0.2-1.0)


 


Aspartate Amino Transf


(AST/SGOT) 27 U/L (15-37) 





 


Alanine Aminotransferase


(ALT/SGPT) 122 U/L


(16-63)


 


Alkaline Phosphatase


 61 U/L


()


 


NT-Pro-B-Type Natriuretic


Peptide 135 pg/mL


(0-124)


 


Total Protein


 5.6 g/dL


(6.4-8.2)


 


Albumin


 2.2 g/dL


(3.4-5.0)


 


Albumin/Globulin Ratio 0.6 (1.0-1.7) 








Laboratory Tests








Test


 8/8/21


04:30


 


White Blood Count


 14.0 x10^3/uL


(4.0-11.0)


 


Red Blood Count


 4.84 x10^6/uL


(4.30-5.70)


 


Hemoglobin


 15.3 g/dL


(13.0-17.5)


 


Hematocrit


 45.0 %


(39.0-53.0)


 


Mean Corpuscular Volume 93 fL () 


 


Mean Corpuscular Hemoglobin 32 pg (25-35) 


 


Mean Corpuscular Hemoglobin


Concent 34 g/dL


(31-37)


 


Red Cell Distribution Width


 12.8 %


(11.5-14.5)


 


Platelet Count


 126 x10^3/uL


(140-400)


 


Neutrophils (%) (Auto) 94 % (31-73) 


 


Lymphocytes (%) (Auto) 2 % (24-48) 


 


Monocytes (%) (Auto) 4 % (0-9) 


 


Eosinophils (%) (Auto) 0 % (0-3) 


 


Basophils (%) (Auto) 0 % (0-3) 


 


Neutrophils # (Auto)


 13.2 x10^3/uL


(1.8-7.7)


 


Lymphocytes # (Auto)


 0.3 x10^3/uL


(1.0-4.8)


 


Monocytes # (Auto)


 0.5 x10^3/uL


(0.0-1.1)


 


Eosinophils # (Auto)


 0.0 x10^3/uL


(0.0-0.7)


 


Basophils # (Auto)


 0.0 x10^3/uL


(0.0-0.2)


 


Sodium Level


 139 mmol/L


(136-145)


 


Potassium Level


 4.6 mmol/L


(3.5-5.1)


 


Chloride Level


 104 mmol/L


()


 


Carbon Dioxide Level


 32 mmol/L


(21-32)


 


Anion Gap 3 (6-14) 


 


Blood Urea Nitrogen


 23 mg/dL


(8-26)


 


Creatinine


 0.8 mg/dL


(0.7-1.3)


 


Estimated GFR


(Cockcroft-Gault) 100.0 





 


BUN/Creatinine Ratio 29 (6-20) 


 


Glucose Level


 133 mg/dL


(70-99)


 


Calcium Level


 8.4 mg/dL


(8.5-10.1)


 


Total Bilirubin


 0.6 mg/dL


(0.2-1.0)


 


Aspartate Amino Transf


(AST/SGOT) 27 U/L (15-37) 





 


Alanine Aminotransferase


(ALT/SGPT) 122 U/L


(16-63)


 


Alkaline Phosphatase


 61 U/L


()


 


NT-Pro-B-Type Natriuretic


Peptide 135 pg/mL


(0-124)


 


Total Protein


 5.6 g/dL


(6.4-8.2)


 


Albumin


 2.2 g/dL


(3.4-5.0)


 


Albumin/Globulin Ratio 0.6 (1.0-1.7) 








Medications





Active Scripts








 Medications  Dose


 Route/Sig


 Max Daily Dose Days Date Category Dose


Instructions


 


 Morphine Sulfate


 2 Mg/1 Ml


 Cartridge  2 Mg


 IV PRN Q4HRS PRN


   7/27/21 Reported 


 


 Zinc Sulfate 50


 Mg Tablet  220 Mg


 PO DAILY


   7/27/21 Reported 


 


 Methylprednisolone


 Sod Succ 125 Mg


 Vial  60 Mg


 IJ Q8HRS


   7/27/21 Reported 


 


 Combivent


 Respimat Inhal


  (Ipratropium/Albuterol


 Sulfate) 4 Gm


 Aer.w.adap  2 Inh


 IH QID


   7/27/21 Reported 


 


 Pepcid


  (Famotidine) 20


 Mg Tablet  20 Mg


 PO HS


   7/27/21 Reported 


 


 Lovenox


  (Enoxaparin


 Sodium) 30 Mg/0.3


 Ml Disp.syrin  30 Mg


 SQ DAILY


   7/27/21 Reported 


 


 Vitamin D3 **


  (Vitamin D) 125


 Mcg Capsule  125 Mcg


 PO DAILY


   7/27/21 Reported  5,000 UNITS = 125 MCG


 


 Zyrtec


  (Cetirizine Hcl)


 10 Mg Tablet  1 Tab


 PO DAILY


   7/27/21 Reported 


 


 Ascorbic Acid 500


 Mg Tablet  2 Mg


 PO DAILY


   7/27/21 Reported 


 


 Acetaminophen 325


 Mg Tablet  2 Tab


 PO PRN Q6HRS PRN


  30 7/27/21 Reported 


 


 No Known


 Medications Prior


 To Admisstion


  (Info)  Each  1 Each


 


   8/25/20 Reported 











Impression


.


IMPRESSION:


1.  Acute hypoxemic respiratory failure secondary to COVID-19 viral 

pneumonia./Acute lung injury and early ARDS.  Slowly improving


2.  COVID-19 viral pneumonia.


3.  Abnormal chest x-ray, compatible with viral pneumonia.


4.  Nonspecific anxiety, possible clinical depression





Plan


.


Updated 8/8/21


Continue supplemental oxygen to keep sats above 92% currently back on Vapotherm 

and tolerating well.  100% FiO2 and 40 L flow.


S/P remdesivir


Continue steroids with taper


Off antibiotics.


PT/OT


DVT/GI PPX :lovenox 


D/W RN and RT


remains critical but stable








Updated 8/7/21


Continue supplemental oxygen to keep sats above 92% currently back on Vapotherm 

and tolerating well.  100% FiO2 and 40 L flow.


S/P remdesivir


Continue steroids with taper


Off antibiotics.


PT/OT


DVT/GI PPX :lovenox 


D/W RN and RT








Updated 8/6/21


Continue supplemental oxygen to keep sats above 92% on BIPAP 70%, monitor 

respiratory status closely


S/P remdesivir


Continue steroids currently on 60 mg IV twice daily, with slow taper, will need 

full 10-day course, started on 7/27/2021,taper


PT/OT


DVT/GI PPX :lovenox 


D/W RN and RT


Patient willing to try Vapotherm.  Discussed with RN.





Updated 8/5/21


Continue supplemental oxygen to keep sats above 92% on BIPAP 70%, monitor 

respiratory status closely


S/P remdesivir


Continue steroids currently on 60 mg IV twice daily, with slow taper, will need 

full 10-day course, started on 7/27/2021


PT/OT


DVT/GI PPX :lovenox 


D/W RN and RT


Awaiting LTAC transfer





Updated 8/4/21


Continue supplemental oxygen to keep sats above 92% on BIPAP 70%, monitor 

respiratory status closely


S/P remdesivir


Continue steroids currently on 60 mg IV twice daily, with slow taper, will need 

full 10-day course, started on 7/27/2021


PT/OT


DVT/GI PPX :lovenox 


D/W RN and RT








Updated 8/3/21


Continue supplemental oxygen to keep sats above 92% on BIPAP 75%, monitor 

respiratory status closely


S/P remdesivir


Continue steroids currently on 60 mg IV twice daily, with slow taper, will need 

full 10-day course, started on 7/27/2021


PT/OT


DVT/GI PPX :lovenox 


D/W RN and RT





Updated 8/2/21


Continue supplemental oxygen to keep sats above 92% on BIPAP 75%, monitor 

respiratory status closely


S/P remdesivir


Continue steroids 


Off ABX and ID has signed off


PT/OT


DVT/GI PPX :lovenox 


D/W RN and RT








Updated 8/1


Continue BiPAP at 75%, decrease FiO2 as tolerated


Stressed the importance of sitting up in a chair,


Continue remdesivir, steroids,


As needed Haldol for anxiety


Discussed case with wife per telephone, updated her, answered all questions.











ASHLEY YEAGER MD                  Aug 8, 2021 12:13

## 2021-08-09 VITALS — DIASTOLIC BLOOD PRESSURE: 70 MMHG | SYSTOLIC BLOOD PRESSURE: 124 MMHG

## 2021-08-09 VITALS — SYSTOLIC BLOOD PRESSURE: 147 MMHG | DIASTOLIC BLOOD PRESSURE: 80 MMHG

## 2021-08-09 VITALS — SYSTOLIC BLOOD PRESSURE: 137 MMHG | DIASTOLIC BLOOD PRESSURE: 78 MMHG

## 2021-08-09 VITALS — DIASTOLIC BLOOD PRESSURE: 73 MMHG | SYSTOLIC BLOOD PRESSURE: 147 MMHG

## 2021-08-09 VITALS — SYSTOLIC BLOOD PRESSURE: 130 MMHG | DIASTOLIC BLOOD PRESSURE: 76 MMHG

## 2021-08-09 VITALS — SYSTOLIC BLOOD PRESSURE: 130 MMHG | DIASTOLIC BLOOD PRESSURE: 84 MMHG

## 2021-08-09 LAB
ALBUMIN SERPL-MCNC: 2.4 G/DL (ref 3.4–5)
ALBUMIN/GLOB SERPL: 0.6 {RATIO} (ref 1–1.7)
ALP SERPL-CCNC: 95 U/L (ref 46–116)
ALT SERPL-CCNC: 111 U/L (ref 16–63)
ANION GAP SERPL CALC-SCNC: 8 MMOL/L (ref 6–14)
AST SERPL-CCNC: 28 U/L (ref 15–37)
BASOPHILS # BLD AUTO: 0 X10^3/UL (ref 0–0.2)
BASOPHILS NFR BLD: 0 % (ref 0–3)
BILIRUB SERPL-MCNC: 0.5 MG/DL (ref 0.2–1)
BUN SERPL-MCNC: 21 MG/DL (ref 8–26)
BUN/CREAT SERPL: 23 (ref 6–20)
CALCIUM SERPL-MCNC: 8.8 MG/DL (ref 8.5–10.1)
CHLORIDE SERPL-SCNC: 102 MMOL/L (ref 98–107)
CO2 SERPL-SCNC: 28 MMOL/L (ref 21–32)
CREAT SERPL-MCNC: 0.9 MG/DL (ref 0.7–1.3)
EOSINOPHIL NFR BLD: 0 % (ref 0–3)
EOSINOPHIL NFR BLD: 0 X10^3/UL (ref 0–0.7)
ERYTHROCYTE [DISTWIDTH] IN BLOOD BY AUTOMATED COUNT: 12.9 % (ref 11.5–14.5)
GFR SERPLBLD BASED ON 1.73 SQ M-ARVRAT: 87.3 ML/MIN
GLUCOSE SERPL-MCNC: 155 MG/DL (ref 70–99)
HCT VFR BLD CALC: 47.3 % (ref 39–53)
HGB BLD-MCNC: 15.9 G/DL (ref 13–17.5)
LYMPHOCYTES # BLD: 0.3 X10^3/UL (ref 1–4.8)
LYMPHOCYTES NFR BLD AUTO: 2 % (ref 24–48)
MCH RBC QN AUTO: 31 PG (ref 25–35)
MCHC RBC AUTO-ENTMCNC: 34 G/DL (ref 31–37)
MCV RBC AUTO: 93 FL (ref 79–100)
MONO #: 0.4 X10^3/UL (ref 0–1.1)
MONOCYTES NFR BLD: 2 % (ref 0–9)
NEUT #: 15.9 X10^3/UL (ref 1.8–7.7)
NEUTROPHILS NFR BLD AUTO: 96 % (ref 31–73)
PLATELET # BLD AUTO: 132 X10^3/UL (ref 140–400)
POTASSIUM SERPL-SCNC: 4.6 MMOL/L (ref 3.5–5.1)
PROT SERPL-MCNC: 6.1 G/DL (ref 6.4–8.2)
RBC # BLD AUTO: 5.06 X10^6/UL (ref 4.3–5.7)
SODIUM SERPL-SCNC: 138 MMOL/L (ref 136–145)
WBC # BLD AUTO: 16.6 X10^3/UL (ref 4–11)

## 2021-08-09 RX ADMIN — Medication SCH MG: at 09:31

## 2021-08-09 RX ADMIN — IPRATROPIUM BROMIDE AND ALBUTEROL SCH PUFF: 20; 100 SPRAY, METERED RESPIRATORY (INHALATION) at 09:31

## 2021-08-09 RX ADMIN — WATER PRN ML: 1 INJECTION INTRAVENOUS at 19:50

## 2021-08-09 RX ADMIN — WATER PRN ML: 1 INJECTION INTRAVENOUS at 08:34

## 2021-08-09 RX ADMIN — METHYLPREDNISOLONE SODIUM SUCCINATE SCH MG: 40 INJECTION, POWDER, FOR SOLUTION INTRAMUSCULAR; INTRAVENOUS at 17:33

## 2021-08-09 RX ADMIN — CHOLECALCIFEROL CAP 125 MCG (5000 UNIT) SCH UNIT: 125 CAP at 09:31

## 2021-08-09 RX ADMIN — ACETAMINOPHEN PRN MG: 325 TABLET, FILM COATED ORAL at 20:18

## 2021-08-09 RX ADMIN — CETIRIZINE HYDROCHLORIDE SCH MG: 10 TABLET, FILM COATED ORAL at 09:31

## 2021-08-09 RX ADMIN — ZINC SULFATE CAP 220 MG (50 MG ELEMENTAL ZN) SCH MG: 220 (50 ZN) CAP at 09:31

## 2021-08-09 RX ADMIN — METHYLPREDNISOLONE SODIUM SUCCINATE SCH MG: 40 INJECTION, POWDER, FOR SOLUTION INTRAMUSCULAR; INTRAVENOUS at 05:35

## 2021-08-09 RX ADMIN — FAMOTIDINE SCH MG: 20 TABLET ORAL at 20:18

## 2021-08-09 RX ADMIN — ENOXAPARIN SODIUM SCH MG: 40 INJECTION SUBCUTANEOUS at 20:18

## 2021-08-09 RX ADMIN — IPRATROPIUM BROMIDE AND ALBUTEROL SCH PUFF: 20; 100 SPRAY, METERED RESPIRATORY (INHALATION) at 12:01

## 2021-08-09 RX ADMIN — IPRATROPIUM BROMIDE AND ALBUTEROL SCH PUFF: 20; 100 SPRAY, METERED RESPIRATORY (INHALATION) at 15:45

## 2021-08-09 RX ADMIN — IPRATROPIUM BROMIDE AND ALBUTEROL SCH PUFF: 20; 100 SPRAY, METERED RESPIRATORY (INHALATION) at 20:17

## 2021-08-09 NOTE — PDOC
[employer] PROGRESS NOTE


Date of Service


DOS:


DATE: 8/9/21 


TIME: 11:44





Chief Complaint


Chief Complaint


SEPSIS, was POA


Severe malnutrition was POA, with obesity, BMI 32


Acute hypoxic respiratory failure


COVID-19 pneumonia r


transaminitis





History of Present Illness


History of Present Illness


Mr Read is a 56-year-old male with no significant past medical history except 

for degenerative joint disease who was transferred from [facility]  

with complaints of fever, headache, dry cough, shortness of breath for the past 

3 days, admitted on 7/26/2021.  Patient stated that he was actually exposed from

his son he was worsening and required to come to the ED at [facility] and he was

found to be hypoxic and required 2 L of nasal cannula oxygen.  Chest x-ray over 

there showed bilateral mild bibasilar infiltrates.  Covid test was positive and 

patient was transferred to [facility] for pulmonary evaluation and treatment.  Patient 

was started on Remdesivir and IV steroids.  Currently patient is requiring 15 L 

high flow nasal cannula and IV steroids.





7/30: No acute events overnight.  Patient was saturating 92% on 15 L nasal 

cannula and he had desatted down to 70% after he had to blow his nose.  Patient 

was placed on BiPAP at 100% FiO2.  Chest leg appears to be worsening.


7/31: Last dose of remdesivir. Could not tolerate vapotherm and is now on BIPAP.


8/1:  poor Po intake, dry,   dark urine


8/2: Desaturates to 72% when he takes off his BiPAP.  Advised to get back on 

BIPAP after eating. he is able to place himself back on. Frustrated with the 

severity of his hypoxia.


8/3: Status post remdesivir. Still requiring BiPAP 75% FiO2 with significant 

desaturations when he is not wearing assessment with nonrebreather O2.


8/4: Still with significant desaturations when he raises BiPAP even on 15 L high

flow nasal cannula.  Discussed transitioning back to Vapotherm if we can treat 

his anxiety.  He is amenable to this.  He is frustrated with staying in the 

hospital understands he may be a longer term COVID recovery and is amenable to 

LTACH referral.  I discussed with pulmonology.


8/5: Afebrile. 70% FiO2 on BiPAP 12/6.  Feels better.  Did not tolerated 

Vapotherm.  Significant desaturations when he transitions to high flow nasal 

cannula O2 while eating.  Discussed referral to LTACH with pulmonology and ID as

it appears he will need a longer COVID recovery.


8/6: Patient on 70% FiO2.  Via BiPAP 12/6 when seen by pulmonology. Feels better

and is seen on vapotherm 40l/min 100% FiO2 with saturations 89%, tolerating 

well.





8/8/2021


patient seen at edge of bed. FiO2 at 89%. Dropped to low 80s while talking to 

us. Educated patient on conserving energy and working to keep FiO2 higher.


D/W RN


Chart Reviewed





8/9/2021


Patient was seen and examined in bed. FiO2 of 87% while on Vapotherm with 40L of

90% O2.


D/W RN, Chart Review


Patient has not had a bowel movement in two days. 


Patient feels he is doing better but his FiO2 still drops with activity or 

speaking


Discussed need to rest and focus on breathing to maintain proper FiO2.





Vitals/I&O


Vitals/I&O:





                                   Vital Signs








  Date Time  Temp Pulse Resp B/P (MAP) Pulse Ox O2 Delivery O2 Flow Rate FiO2


 


8/9/21 10:19     90 VAPOTHERM 40.0 


 


8/9/21 06:18 95.0 85 20 137/78 (97)    





 95.0       














                                    I & O   


 


 8/8/21 8/8/21 8/9/21





 15:00 23:00 07:00


 


Intake Total 580 ml  100 ml


 


Output Total  600 ml 600 ml


 


Balance 580 ml -600 ml -500 ml











Physical Exam


Physical Exam:








GEN axo3 male on BIPAP


HEENT:  Normocephalic, atraumatic.  Anicteric. 


NECK:  Supple, no JVD.


LUNGS:  Crackles bilaterally.   


HEART:  S1, S2.  


ABDOMEN:  Soft, nontender, nondistended.  Bowel sounds present.


EXTREMITIES:  No edema, no cyanosis.


DERMATOLOGIC:  Warm, dry, no generalized rash.


NEUROLOGIC:  Alert, oriented, grossly nonfocal.


PSYCHIATRIC:  Calm and cooperative. 


 PIV looks clean.


General:  Alert, mild distress


Heart:  Regular rate (tele reg,  reviewed)


Abdomen:  Normal bowel sounds


Extremities:  No clubbing


Skin:  No rashes





Labs


Labs:





Laboratory Tests








Test


 8/9/21


09:05


 


White Blood Count


 16.6 x10^3/uL


(4.0-11.0)


 


Red Blood Count


 5.06 x10^6/uL


(4.30-5.70)


 


Hemoglobin


 15.9 g/dL


(13.0-17.5)


 


Hematocrit


 47.3 %


(39.0-53.0)


 


Mean Corpuscular Volume 93 fL () 


 


Mean Corpuscular Hemoglobin 31 pg (25-35) 


 


Mean Corpuscular Hemoglobin


Concent 34 g/dL


(31-37)


 


Red Cell Distribution Width


 12.9 %


(11.5-14.5)


 


Platelet Count


 132 x10^3/uL


(140-400)


 


Neutrophils (%) (Auto) 96 % (31-73) 


 


Lymphocytes (%) (Auto) 2 % (24-48) 


 


Monocytes (%) (Auto) 2 % (0-9) 


 


Eosinophils (%) (Auto) 0 % (0-3) 


 


Basophils (%) (Auto) 0 % (0-3) 


 


Neutrophils # (Auto)


 15.9 x10^3/uL


(1.8-7.7)


 


Lymphocytes # (Auto)


 0.3 x10^3/uL


(1.0-4.8)


 


Monocytes # (Auto)


 0.4 x10^3/uL


(0.0-1.1)


 


Eosinophils # (Auto)


 0.0 x10^3/uL


(0.0-0.7)


 


Basophils # (Auto)


 0.0 x10^3/uL


(0.0-0.2)


 


Sodium Level


 138 mmol/L


(136-145)


 


Potassium Level


 4.6 mmol/L


(3.5-5.1)


 


Chloride Level


 102 mmol/L


()


 


Carbon Dioxide Level


 28 mmol/L


(21-32)


 


Anion Gap 8 (6-14) 


 


Blood Urea Nitrogen


 21 mg/dL


(8-26)


 


Creatinine


 0.9 mg/dL


(0.7-1.3)


 


Estimated GFR


(Cockcroft-Gault) 87.3 





 


BUN/Creatinine Ratio 23 (6-20) 


 


Glucose Level


 155 mg/dL


(70-99)


 


Calcium Level


 8.8 mg/dL


(8.5-10.1)


 


Total Bilirubin


 0.5 mg/dL


(0.2-1.0)


 


Aspartate Amino Transf


(AST/SGOT) 28 U/L (15-37) 





 


Alanine Aminotransferase


(ALT/SGPT) 111 U/L


(16-63)


 


Alkaline Phosphatase


 95 U/L


()


 


Total Protein


 6.1 g/dL


(6.4-8.2)


 


Albumin


 2.4 g/dL


(3.4-5.0)


 


Albumin/Globulin Ratio 0.6 (1.0-1.7) 











Review of Systems


Review of Systems:


Patient denies any numbness or tingling in extremities


Denies HA or dizziness





Assessment and Plan


Assessmemt and Plan


SEPSIS, was POA


Severe malnutrition was POA, with obesity, BMI 32


Acute hypoxic respiratory failure


COVID-19 pneumonia requiring 


transaminitis, 


 


Plan


Begin laxative


Continue COVID protocol


Completed IV Remdesivir


Continue IV thiamine and vitamin C


Continue IV steroids


Maintain O2 saturation greater than 92%


DVT prophylaxis with Lovenox


Trend labs


PT/OT


Anoxic encephalopathy


Full code


Cardiac monitoring


Pulmonary following


Infectious disease following


Appreciate subspecialist input


Prognosis guarded





Comment


Review of Relevant


I have reviewed the following items genesis (where applicable) has been applied.


Medications:





Current Medications








 Medications


  (Trade)  Dose


 Ordered  Sig/Shayy


 Route


 PRN Reason  Start Time


 Stop Time Status Last Admin


Dose Admin


 


 Guaifenesin


  (Mucinex)  1,200 mg  BID


 PO


   8/8/21 21:00


    8/9/21 09:31














Justifications for Admission


Other Justification














SHIRIN SCHNEIDER III DO            Aug 9, 2021 11:50

## 2021-08-09 NOTE — PDOC
PULMONARY PROGRESS NOTES


DATE: 8/9/21 


TIME: 09:46


Subjective


Patient is on Vapotherm.  Down to 95% FiO2 and 40 L and tolerating well.


No increased shortness of breath or cough


Afebrile


Vitals





Vital Signs








  Date Time  Temp Pulse Resp B/P (MAP) Pulse Ox O2 Delivery O2 Flow Rate FiO2


 


8/9/21 08:32     90 VAPOTHERM 40.0 


 


8/9/21 06:18 95.0 85 20 137/78 (97)    





 95.0       








Comments


Pt. seen during covid -19 pandemic visual exam preformed 


no distress 


no obvious rash or edema


Labs





Laboratory Tests








Test


 8/8/21


04:30


 


White Blood Count


 14.0 x10^3/uL


(4.0-11.0)


 


Red Blood Count


 4.84 x10^6/uL


(4.30-5.70)


 


Hemoglobin


 15.3 g/dL


(13.0-17.5)


 


Hematocrit


 45.0 %


(39.0-53.0)


 


Mean Corpuscular Volume 93 fL () 


 


Mean Corpuscular Hemoglobin 32 pg (25-35) 


 


Mean Corpuscular Hemoglobin


Concent 34 g/dL


(31-37)


 


Red Cell Distribution Width


 12.8 %


(11.5-14.5)


 


Platelet Count


 126 x10^3/uL


(140-400)


 


Neutrophils (%) (Auto) 94 % (31-73) 


 


Lymphocytes (%) (Auto) 2 % (24-48) 


 


Monocytes (%) (Auto) 4 % (0-9) 


 


Eosinophils (%) (Auto) 0 % (0-3) 


 


Basophils (%) (Auto) 0 % (0-3) 


 


Neutrophils # (Auto)


 13.2 x10^3/uL


(1.8-7.7)


 


Lymphocytes # (Auto)


 0.3 x10^3/uL


(1.0-4.8)


 


Monocytes # (Auto)


 0.5 x10^3/uL


(0.0-1.1)


 


Eosinophils # (Auto)


 0.0 x10^3/uL


(0.0-0.7)


 


Basophils # (Auto)


 0.0 x10^3/uL


(0.0-0.2)


 


Sodium Level


 139 mmol/L


(136-145)


 


Potassium Level


 4.6 mmol/L


(3.5-5.1)


 


Chloride Level


 104 mmol/L


()


 


Carbon Dioxide Level


 32 mmol/L


(21-32)


 


Anion Gap 3 (6-14) 


 


Blood Urea Nitrogen


 23 mg/dL


(8-26)


 


Creatinine


 0.8 mg/dL


(0.7-1.3)


 


Estimated GFR


(Cockcroft-Gault) 100.0 





 


BUN/Creatinine Ratio 29 (6-20) 


 


Glucose Level


 133 mg/dL


(70-99)


 


Calcium Level


 8.4 mg/dL


(8.5-10.1)


 


Total Bilirubin


 0.6 mg/dL


(0.2-1.0)


 


Aspartate Amino Transf


(AST/SGOT) 27 U/L (15-37) 





 


Alanine Aminotransferase


(ALT/SGPT) 122 U/L


(16-63)


 


Alkaline Phosphatase


 61 U/L


()


 


NT-Pro-B-Type Natriuretic


Peptide 135 pg/mL


(0-124)


 


Total Protein


 5.6 g/dL


(6.4-8.2)


 


Albumin


 2.2 g/dL


(3.4-5.0)


 


Albumin/Globulin Ratio 0.6 (1.0-1.7) 








Medications





Active Scripts








 Medications  Dose


 Route/Sig


 Max Daily Dose Days Date Category Dose


Instructions


 


 Morphine Sulfate


 2 Mg/1 Ml


 Cartridge  2 Mg


 IV PRN Q4HRS PRN


   7/27/21 Reported 


 


 Zinc Sulfate 50


 Mg Tablet  220 Mg


 PO DAILY


   7/27/21 Reported 


 


 Methylprednisolone


 Sod Succ 125 Mg


 Vial  60 Mg


 IJ Q8HRS


   7/27/21 Reported 


 


 Combivent


 Respimat Inhal


  (Ipratropium/Albuterol


 Sulfate) 4 Gm


 Aer.w.adap  2 Inh


 IH QID


   7/27/21 Reported 


 


 Pepcid


  (Famotidine) 20


 Mg Tablet  20 Mg


 PO HS


   7/27/21 Reported 


 


 Lovenox


  (Enoxaparin


 Sodium) 30 Mg/0.3


 Ml Disp.syrin  30 Mg


 SQ DAILY


   7/27/21 Reported 


 


 Vitamin D3 **


  (Vitamin D) 125


 Mcg Capsule  125 Mcg


 PO DAILY


   7/27/21 Reported  5,000 UNITS = 125 MCG


 


 Zyrtec


  (Cetirizine Hcl)


 10 Mg Tablet  1 Tab


 PO DAILY


   7/27/21 Reported 


 


 Ascorbic Acid 500


 Mg Tablet  2 Mg


 PO DAILY


   7/27/21 Reported 


 


 Acetaminophen 325


 Mg Tablet  2 Tab


 PO PRN Q6HRS PRN


  30 7/27/21 Reported 


 


 No Known


 Medications Prior


 To Admisstion


  (Info)  Each  1 Each


 


   8/25/20 Reported 











Impression


.


IMPRESSION:


1.  Acute hypoxemic respiratory failure secondary to COVID-19 viral pneumo

luis armando./Acute lung injury and early ARDS.  Slowly improving


2.  COVID-19 viral pneumonia.


3.  Abnormal chest x-ray, compatible with viral pneumonia.


4.  Nonspecific anxiety, possible clinical depression





Plan


.


Updated 8/9/21


Continue supplemental oxygen to keep sats above 92% currently on Vapotherm and 

tolerating well.  95% FiO2 and 40 L flow.


S/P remdesivir


Continue steroids with taper


Off antibiotics.


PT/OT


DVT/GI PPX :lovenox 


D/W RN and RT


remains critical but stable





Updated 8/8/21


Continue supplemental oxygen to keep sats above 92% currently back on Vapotherm 

and tolerating well.  100% FiO2 and 40 L flow.


S/P remdesivir


Continue steroids with taper


Off antibiotics.


PT/OT


DVT/GI PPX :lovenox 


D/W RN and RT


remains critical but stable








Updated 8/7/21


Continue supplemental oxygen to keep sats above 92% currently back on Vapotherm 

and tolerating well.  100% FiO2 and 40 L flow.


S/P remdesivir


Continue steroids with taper


Off antibiotics.


PT/OT


DVT/GI PPX :lovenox 


D/W RN and RT








Updated 8/6/21


Continue supplemental oxygen to keep sats above 92% on BIPAP 70%, monitor 

respiratory status closely


S/P remdesivir


Continue steroids currently on 60 mg IV twice daily, with slow taper, will need 

full 10-day course, started on 7/27/2021,taper


PT/OT


DVT/GI PPX :lovenox 


D/W RN and RT


Patient willing to try Vapotherm.  Discussed with RN.





Updated 8/5/21


Continue supplemental oxygen to keep sats above 92% on BIPAP 70%, monitor 

respiratory status closely


S/P remdesivir


Continue steroids currently on 60 mg IV twice daily, with slow taper, will need 

full 10-day course, started on 7/27/2021


PT/OT


DVT/GI PPX :lovenox 


D/W RN and RT


Awaiting LTAC transfer





Updated 8/4/21


Continue supplemental oxygen to keep sats above 92% on BIPAP 70%, monitor resp

iratory status closely


S/P remdesivir


Continue steroids currently on 60 mg IV twice daily, with slow taper, will need 

full 10-day course, started on 7/27/2021


PT/OT


DVT/GI PPX :lovenox 


D/W RN and RT








Updated 8/3/21


Continue supplemental oxygen to keep sats above 92% on BIPAP 75%, monitor 

respiratory status closely


S/P remdesivir


Continue steroids currently on 60 mg IV twice daily, with slow taper, will need 

full 10-day course, started on 7/27/2021


PT/OT


DVT/GI PPX :lovenox 


D/W RN and RT





Updated 8/2/21


Continue supplemental oxygen to keep sats above 92% on BIPAP 75%, monitor 

respiratory status closely


S/P remdesivir


Continue steroids 


Off ABX and ID has signed off


PT/OT


DVT/GI PPX :lovenox 


D/W RN and RT








Updated 8/1


Continue BiPAP at 75%, decrease FiO2 as tolerated


Stressed the importance of sitting up in a chair,


Continue remdesivir, steroids,


As needed Haldol for anxiety


Discussed case with wife per telephone, updated her, answered all questions.











ASHLEY YEAGER MD                  Aug 9, 2021 09:47

## 2021-08-09 NOTE — NUR
SS following up with discharge planning. SS reviewed pt chart and discussed with pt RN. Pt 
is currently on Vapotherm at 40 liters. COVID19 positive. Pt on IV Solu-Medrol. Insurance 
denied pt for LTACH placement. SS will continue to follow for discharge planning.

## 2021-08-09 NOTE — NUR
Assessment completed vss poc explained pt denied pain at time of assessment pt request 
Tylenol 325mg instead of 650mg ordered will resume care and continue to monitor pt.

## 2021-08-10 VITALS — SYSTOLIC BLOOD PRESSURE: 139 MMHG | DIASTOLIC BLOOD PRESSURE: 78 MMHG

## 2021-08-10 VITALS — SYSTOLIC BLOOD PRESSURE: 130 MMHG | DIASTOLIC BLOOD PRESSURE: 87 MMHG

## 2021-08-10 VITALS — DIASTOLIC BLOOD PRESSURE: 79 MMHG | SYSTOLIC BLOOD PRESSURE: 128 MMHG

## 2021-08-10 VITALS — SYSTOLIC BLOOD PRESSURE: 134 MMHG | DIASTOLIC BLOOD PRESSURE: 84 MMHG

## 2021-08-10 VITALS — SYSTOLIC BLOOD PRESSURE: 122 MMHG | DIASTOLIC BLOOD PRESSURE: 64 MMHG

## 2021-08-10 VITALS — SYSTOLIC BLOOD PRESSURE: 134 MMHG | DIASTOLIC BLOOD PRESSURE: 87 MMHG

## 2021-08-10 LAB
ALBUMIN SERPL-MCNC: 2.2 G/DL (ref 3.4–5)
ALBUMIN/GLOB SERPL: 0.6 {RATIO} (ref 1–1.7)
ALP SERPL-CCNC: 71 U/L (ref 46–116)
ALT SERPL-CCNC: 110 U/L (ref 16–63)
ANION GAP SERPL CALC-SCNC: 4 MMOL/L (ref 6–14)
AST SERPL-CCNC: 31 U/L (ref 15–37)
BASOPHILS # BLD AUTO: 0 X10^3/UL (ref 0–0.2)
BASOPHILS NFR BLD: 0 % (ref 0–3)
BILIRUB SERPL-MCNC: 0.5 MG/DL (ref 0.2–1)
BUN SERPL-MCNC: 24 MG/DL (ref 8–26)
BUN/CREAT SERPL: 34 (ref 6–20)
CALCIUM SERPL-MCNC: 8.5 MG/DL (ref 8.5–10.1)
CHLORIDE SERPL-SCNC: 104 MMOL/L (ref 98–107)
CO2 SERPL-SCNC: 30 MMOL/L (ref 21–32)
CREAT SERPL-MCNC: 0.7 MG/DL (ref 0.7–1.3)
EOSINOPHIL NFR BLD: 0 % (ref 0–3)
EOSINOPHIL NFR BLD: 0 X10^3/UL (ref 0–0.7)
ERYTHROCYTE [DISTWIDTH] IN BLOOD BY AUTOMATED COUNT: 13.2 % (ref 11.5–14.5)
GFR SERPLBLD BASED ON 1.73 SQ M-ARVRAT: 116.7 ML/MIN
GLUCOSE SERPL-MCNC: 130 MG/DL (ref 70–99)
HCT VFR BLD CALC: 44.4 % (ref 39–53)
HGB BLD-MCNC: 15.1 G/DL (ref 13–17.5)
LYMPHOCYTES # BLD: 0.4 X10^3/UL (ref 1–4.8)
LYMPHOCYTES NFR BLD AUTO: 3 % (ref 24–48)
MCH RBC QN AUTO: 32 PG (ref 25–35)
MCHC RBC AUTO-ENTMCNC: 34 G/DL (ref 31–37)
MCV RBC AUTO: 93 FL (ref 79–100)
MONO #: 0.4 X10^3/UL (ref 0–1.1)
MONOCYTES NFR BLD: 3 % (ref 0–9)
NEUT #: 13.4 X10^3/UL (ref 1.8–7.7)
NEUTROPHILS NFR BLD AUTO: 94 % (ref 31–73)
PLATELET # BLD AUTO: 118 X10^3/UL (ref 140–400)
POTASSIUM SERPL-SCNC: 4.3 MMOL/L (ref 3.5–5.1)
PROT SERPL-MCNC: 5.6 G/DL (ref 6.4–8.2)
RBC # BLD AUTO: 4.78 X10^6/UL (ref 4.3–5.7)
SODIUM SERPL-SCNC: 138 MMOL/L (ref 136–145)
WBC # BLD AUTO: 14.2 X10^3/UL (ref 4–11)

## 2021-08-10 RX ADMIN — WATER PRN ML: 1 INJECTION INTRAVENOUS at 06:01

## 2021-08-10 RX ADMIN — ZINC SULFATE CAP 220 MG (50 MG ELEMENTAL ZN) SCH MG: 220 (50 ZN) CAP at 09:59

## 2021-08-10 RX ADMIN — METHYLPREDNISOLONE SODIUM SUCCINATE SCH MG: 40 INJECTION, POWDER, FOR SOLUTION INTRAMUSCULAR; INTRAVENOUS at 17:49

## 2021-08-10 RX ADMIN — FAMOTIDINE SCH MG: 20 TABLET ORAL at 21:48

## 2021-08-10 RX ADMIN — IPRATROPIUM BROMIDE AND ALBUTEROL SCH PUFF: 20; 100 SPRAY, METERED RESPIRATORY (INHALATION) at 20:00

## 2021-08-10 RX ADMIN — IPRATROPIUM BROMIDE AND ALBUTEROL SCH PUFF: 20; 100 SPRAY, METERED RESPIRATORY (INHALATION) at 16:25

## 2021-08-10 RX ADMIN — ACETAMINOPHEN PRN MG: 325 TABLET, FILM COATED ORAL at 21:46

## 2021-08-10 RX ADMIN — METHYLPREDNISOLONE SODIUM SUCCINATE SCH MG: 40 INJECTION, POWDER, FOR SOLUTION INTRAMUSCULAR; INTRAVENOUS at 06:01

## 2021-08-10 RX ADMIN — CHOLECALCIFEROL CAP 125 MCG (5000 UNIT) SCH UNIT: 125 CAP at 09:59

## 2021-08-10 RX ADMIN — IPRATROPIUM BROMIDE AND ALBUTEROL SCH PUFF: 20; 100 SPRAY, METERED RESPIRATORY (INHALATION) at 10:00

## 2021-08-10 RX ADMIN — IPRATROPIUM BROMIDE AND ALBUTEROL SCH PUFF: 20; 100 SPRAY, METERED RESPIRATORY (INHALATION) at 12:37

## 2021-08-10 RX ADMIN — WATER PRN ML: 1 INJECTION INTRAVENOUS at 08:14

## 2021-08-10 RX ADMIN — Medication SCH MG: at 09:59

## 2021-08-10 RX ADMIN — ENOXAPARIN SODIUM SCH MG: 40 INJECTION SUBCUTANEOUS at 21:46

## 2021-08-10 RX ADMIN — CETIRIZINE HYDROCHLORIDE SCH MG: 10 TABLET, FILM COATED ORAL at 09:59

## 2021-08-10 NOTE — PDOC
[employer] PROGRESS NOTE


Date of Service


DOS:


DATE: 8/10/21 


TIME: 12:04





Chief Complaint


Chief Complaint


SEPSIS, was POA


Severe malnutrition was POA, with obesity, BMI 32


Acute hypoxic respiratory failure


COVID-19 pneumonia r


transaminitis





History of Present Illness


History of Present Illness


Mr Read is a 56-year-old male with no significant past medical history except 

for degenerative joint disease who was transferred from [facility]  

with complaints of fever, headache, dry cough, shortness of breath for the past 

3 days, admitted on 7/26/2021.  Patient stated that he was actually exposed from

his son he was worsening and required to come to the ED at [facility] and he was

found to be hypoxic and required 2 L of nasal cannula oxygen.  Chest x-ray over 

there showed bilateral mild bibasilar infiltrates.  Covid test was positive and 

patient was transferred to [facility] for pulmonary evaluation and treatment.  Patient 

was started on Remdesivir and IV steroids.  Currently patient is requiring 15 L 

high flow nasal cannula and IV steroids.





7/30: No acute events overnight.  Patient was saturating 92% on 15 L nasal 

cannula and he had desatted down to 70% after he had to blow his nose.  Patient 

was placed on BiPAP at 100% FiO2.  Chest leg appears to be worsening.


7/31: Last dose of remdesivir. Could not tolerate vapotherm and is now on BIPAP.


8/1:  poor Po intake, dry,   dark urine


8/2: Desaturates to 72% when he takes off his BiPAP.  Advised to get back on 

BIPAP after eating. he is able to place himself back on. Frustrated with the 

severity of his hypoxia.


8/3: Status post remdesivir. Still requiring BiPAP 75% FiO2 with significant 

desaturations when he is not wearing assessment with nonrebreather O2.


8/4: Still with significant desaturations when he raises BiPAP even on 15 L high

flow nasal cannula.  Discussed transitioning back to Vapotherm if we can treat 

his anxiety.  He is amenable to this.  He is frustrated with staying in the 

hospital understands he may be a longer term COVID recovery and is amenable to 

LTACH referral.  I discussed with pulmonology.


8/5: Afebrile. 70% FiO2 on BiPAP 12/6.  Feels better.  Did not tolerated 

Vapotherm.  Significant desaturations when he transitions to high flow nasal 

cannula O2 while eating.  Discussed referral to LTACH with pulmonology and ID as

it appears he will need a longer COVID recovery.


8/6: Patient on 70% FiO2.  Via BiPAP 12/6 when seen by pulmonology. Feels better

and is seen on vapotherm 40l/min 100% FiO2 with saturations 89%, tolerating 

well.





8/8/2021


patient seen at edge of bed. FiO2 at 89%. Dropped to low 80s while talking to 

us. Educated patient on conserving energy and working to keep FiO2 higher.


D/W RN


Chart Reviewed





8/9/2021


Patient was seen and examined in bed. FiO2 of 87% while on Vapotherm with 40L of

90% O2.


D/W RN, Chart Review


Patient has not had a bowel movement in two days. 


Patient feels he is doing better but his FiO2 still drops with activity or 

speaking


Discussed need to rest and focus on breathing to maintain proper FiO2.





8/10/2021


Patient was resting in bed with NAD


Seen and examined in bed. FiO2 of 92% while on Vapotherm with 40L of 90% O2.


D/W RN


Chart Review


Discussed need to rest and focus on breathing to maintain proper FiO2





Vitals/I&O


Vitals/I&O:





                                   Vital Signs








  Date Time  Temp Pulse Resp B/P (MAP) Pulse Ox O2 Delivery O2 Flow Rate FiO2


 


8/10/21 11:50     96 VAPOTHERM 40.0 


 


8/10/21 11:00 98.1 68 18 122/64 (83)    





 98.1       














                                    I & O   


 


 8/9/21 8/9/21 8/10/21





 15:00 23:00 07:00


 


Intake Total 900 ml 620 ml 420 ml


 


Output Total 450 ml 600 ml 1600 ml


 


Balance 450 ml 20 ml -1180 ml











Physical Exam


Physical Exam:








GEN axo3 male on BIPAP


HEENT:  Normocephalic, atraumatic.  Anicteric. 


NECK:  Supple, no JVD.


LUNGS:  Crackles bilaterally.   


HEART:  S1, S2.  


ABDOMEN:  Soft, nontender, nondistended.  Bowel sounds present.


EXTREMITIES:  No edema, no cyanosis.


DERMATOLOGIC:  Warm, dry, no generalized rash.


NEUROLOGIC:  Alert, oriented, grossly nonfocal.


PSYCHIATRIC:  Calm and cooperative. 


 PIV looks clean.


General:  Alert, mild distress


Heart:  Regular rate (tele reg,  reviewed)


Abdomen:  Normal bowel sounds


Extremities:  No clubbing


Skin:  No rashes





Labs


Labs:





Laboratory Tests








Test


 8/10/21


03:34


 


White Blood Count


 14.2 x10^3/uL


(4.0-11.0)


 


Red Blood Count


 4.78 x10^6/uL


(4.30-5.70)


 


Hemoglobin


 15.1 g/dL


(13.0-17.5)


 


Hematocrit


 44.4 %


(39.0-53.0)


 


Mean Corpuscular Volume 93 fL () 


 


Mean Corpuscular Hemoglobin 32 pg (25-35) 


 


Mean Corpuscular Hemoglobin


Concent 34 g/dL


(31-37)


 


Red Cell Distribution Width


 13.2 %


(11.5-14.5)


 


Platelet Count


 118 x10^3/uL


(140-400)


 


Neutrophils (%) (Auto) 94 % (31-73) 


 


Lymphocytes (%) (Auto) 3 % (24-48) 


 


Monocytes (%) (Auto) 3 % (0-9) 


 


Eosinophils (%) (Auto) 0 % (0-3) 


 


Basophils (%) (Auto) 0 % (0-3) 


 


Neutrophils # (Auto)


 13.4 x10^3/uL


(1.8-7.7)


 


Lymphocytes # (Auto)


 0.4 x10^3/uL


(1.0-4.8)


 


Monocytes # (Auto)


 0.4 x10^3/uL


(0.0-1.1)


 


Eosinophils # (Auto)


 0.0 x10^3/uL


(0.0-0.7)


 


Basophils # (Auto)


 0.0 x10^3/uL


(0.0-0.2)


 


Sodium Level


 138 mmol/L


(136-145)


 


Potassium Level


 4.3 mmol/L


(3.5-5.1)


 


Chloride Level


 104 mmol/L


()


 


Carbon Dioxide Level


 30 mmol/L


(21-32)


 


Anion Gap 4 (6-14) 


 


Blood Urea Nitrogen


 24 mg/dL


(8-26)


 


Creatinine


 0.7 mg/dL


(0.7-1.3)


 


Estimated GFR


(Cockcroft-Gault) 116.7 





 


BUN/Creatinine Ratio 34 (6-20) 


 


Glucose Level


 130 mg/dL


(70-99)


 


Calcium Level


 8.5 mg/dL


(8.5-10.1)


 


Total Bilirubin


 0.5 mg/dL


(0.2-1.0)


 


Aspartate Amino Transf


(AST/SGOT) 31 U/L (15-37) 





 


Alanine Aminotransferase


(ALT/SGPT) 110 U/L


(16-63)


 


Alkaline Phosphatase


 71 U/L


()


 


Total Protein


 5.6 g/dL


(6.4-8.2)


 


Albumin


 2.2 g/dL


(3.4-5.0)


 


Albumin/Globulin Ratio 0.6 (1.0-1.7) 











Review of Systems


Review of Systems:


Denies HA or dizzness


Bladder continent


Denies fevers, chills or pain





Assessment and Plan


Assessmemt and Plan


SEPSIS, was POA


Severe malnutrition was POA, with obesity, BMI 32


Acute hypoxic respiratory failure


COVID-19 pneumonia requiring 


transaminitis, 


 


Plan


COVID protocol


Completed IV Remdesivir


Continue IV thiamine and vitamin C


Continue IV steroids


Maintain O2 saturation greater than 92% via NC or 100% non-rebreather


DVT prophylaxis 


Trend labs


PT/OT


Full code


Cardiac monitoring


Appreciate Pulmonology and Infectious Disease Input


Prognosis guarded





Comment


Review of Relevant


I have reviewed the following items genesis (where applicable) has been applied.





Justifications for Admission


Other Justification














SHIRIN SCHNEIDER III DO           Aug 10, 2021 12:10

## 2021-08-10 NOTE — NUR
SS following up with discharge planning. SS reviewed pt chart and discussed with pt RN. Pt 
is currently on Vapotherm. COVID19 positive. Pt on IV Solu Medrol. PT/OT recommended home. 
[employer] denied pt for LTACH placement. SS will continue to follow for discharge 
planning.

## 2021-08-10 NOTE — PDOC
PULMONARY PROGRESS NOTES


DATE: 8/10/21 


TIME: 10:45


Subjective


Patient is on Vapotherm.  Down to 90% FiO2 and 40 L and tolerating well.


No increased shortness of breath or cough


Afebrile


Vitals





Vital Signs








  Date Time  Temp Pulse Resp B/P (MAP) Pulse Ox O2 Delivery O2 Flow Rate FiO2


 


8/10/21 10:06     88 VAPOTHERM 40.0 


 


8/10/21 07:00 97.9 89 18 134/84 (101)    





 97.9       








Comments


Pt. seen during covid -19 pandemic visual exam preformed 


no distress 


no obvious rash or edema


Labs





Laboratory Tests








Test


 8/9/21


09:05 8/10/21


03:34


 


White Blood Count


 16.6 x10^3/uL


(4.0-11.0) 14.2 x10^3/uL


(4.0-11.0)


 


Red Blood Count


 5.06 x10^6/uL


(4.30-5.70) 4.78 x10^6/uL


(4.30-5.70)


 


Hemoglobin


 15.9 g/dL


(13.0-17.5) 15.1 g/dL


(13.0-17.5)


 


Hematocrit


 47.3 %


(39.0-53.0) 44.4 %


(39.0-53.0)


 


Mean Corpuscular Volume 93 fL ()  93 fL () 


 


Mean Corpuscular Hemoglobin 31 pg (25-35)  32 pg (25-35) 


 


Mean Corpuscular Hemoglobin


Concent 34 g/dL


(31-37) 34 g/dL


(31-37)


 


Red Cell Distribution Width


 12.9 %


(11.5-14.5) 13.2 %


(11.5-14.5)


 


Platelet Count


 132 x10^3/uL


(140-400) 118 x10^3/uL


(140-400)


 


Neutrophils (%) (Auto) 96 % (31-73)  94 % (31-73) 


 


Lymphocytes (%) (Auto) 2 % (24-48)  3 % (24-48) 


 


Monocytes (%) (Auto) 2 % (0-9)  3 % (0-9) 


 


Eosinophils (%) (Auto) 0 % (0-3)  0 % (0-3) 


 


Basophils (%) (Auto) 0 % (0-3)  0 % (0-3) 


 


Neutrophils # (Auto)


 15.9 x10^3/uL


(1.8-7.7) 13.4 x10^3/uL


(1.8-7.7)


 


Lymphocytes # (Auto)


 0.3 x10^3/uL


(1.0-4.8) 0.4 x10^3/uL


(1.0-4.8)


 


Monocytes # (Auto)


 0.4 x10^3/uL


(0.0-1.1) 0.4 x10^3/uL


(0.0-1.1)


 


Eosinophils # (Auto)


 0.0 x10^3/uL


(0.0-0.7) 0.0 x10^3/uL


(0.0-0.7)


 


Basophils # (Auto)


 0.0 x10^3/uL


(0.0-0.2) 0.0 x10^3/uL


(0.0-0.2)


 


Sodium Level


 138 mmol/L


(136-145) 138 mmol/L


(136-145)


 


Potassium Level


 4.6 mmol/L


(3.5-5.1) 4.3 mmol/L


(3.5-5.1)


 


Chloride Level


 102 mmol/L


() 104 mmol/L


()


 


Carbon Dioxide Level


 28 mmol/L


(21-32) 30 mmol/L


(21-32)


 


Anion Gap 8 (6-14)  4 (6-14) 


 


Blood Urea Nitrogen


 21 mg/dL


(8-26) 24 mg/dL


(8-26)


 


Creatinine


 0.9 mg/dL


(0.7-1.3) 0.7 mg/dL


(0.7-1.3)


 


Estimated GFR


(Cockcroft-Gault) 87.3 


 116.7 





 


BUN/Creatinine Ratio 23 (6-20)  34 (6-20) 


 


Glucose Level


 155 mg/dL


(70-99) 130 mg/dL


(70-99)


 


Calcium Level


 8.8 mg/dL


(8.5-10.1) 8.5 mg/dL


(8.5-10.1)


 


Total Bilirubin


 0.5 mg/dL


(0.2-1.0) 0.5 mg/dL


(0.2-1.0)


 


Aspartate Amino Transf


(AST/SGOT) 28 U/L (15-37) 


 31 U/L (15-37) 





 


Alanine Aminotransferase


(ALT/SGPT) 111 U/L


(16-63) 110 U/L


(16-63)


 


Alkaline Phosphatase


 95 U/L


() 71 U/L


()


 


Total Protein


 6.1 g/dL


(6.4-8.2) 5.6 g/dL


(6.4-8.2)


 


Albumin


 2.4 g/dL


(3.4-5.0) 2.2 g/dL


(3.4-5.0)


 


Albumin/Globulin Ratio 0.6 (1.0-1.7)  0.6 (1.0-1.7) 








Laboratory Tests








Test


 8/10/21


03:34


 


White Blood Count


 14.2 x10^3/uL


(4.0-11.0)


 


Red Blood Count


 4.78 x10^6/uL


(4.30-5.70)


 


Hemoglobin


 15.1 g/dL


(13.0-17.5)


 


Hematocrit


 44.4 %


(39.0-53.0)


 


Mean Corpuscular Volume 93 fL () 


 


Mean Corpuscular Hemoglobin 32 pg (25-35) 


 


Mean Corpuscular Hemoglobin


Concent 34 g/dL


(31-37)


 


Red Cell Distribution Width


 13.2 %


(11.5-14.5)


 


Platelet Count


 118 x10^3/uL


(140-400)


 


Neutrophils (%) (Auto) 94 % (31-73) 


 


Lymphocytes (%) (Auto) 3 % (24-48) 


 


Monocytes (%) (Auto) 3 % (0-9) 


 


Eosinophils (%) (Auto) 0 % (0-3) 


 


Basophils (%) (Auto) 0 % (0-3) 


 


Neutrophils # (Auto)


 13.4 x10^3/uL


(1.8-7.7)


 


Lymphocytes # (Auto)


 0.4 x10^3/uL


(1.0-4.8)


 


Monocytes # (Auto)


 0.4 x10^3/uL


(0.0-1.1)


 


Eosinophils # (Auto)


 0.0 x10^3/uL


(0.0-0.7)


 


Basophils # (Auto)


 0.0 x10^3/uL


(0.0-0.2)


 


Sodium Level


 138 mmol/L


(136-145)


 


Potassium Level


 4.3 mmol/L


(3.5-5.1)


 


Chloride Level


 104 mmol/L


()


 


Carbon Dioxide Level


 30 mmol/L


(21-32)


 


Anion Gap 4 (6-14) 


 


Blood Urea Nitrogen


 24 mg/dL


(8-26)


 


Creatinine


 0.7 mg/dL


(0.7-1.3)


 


Estimated GFR


(Cockcroft-Gault) 116.7 





 


BUN/Creatinine Ratio 34 (6-20) 


 


Glucose Level


 130 mg/dL


(70-99)


 


Calcium Level


 8.5 mg/dL


(8.5-10.1)


 


Total Bilirubin


 0.5 mg/dL


(0.2-1.0)


 


Aspartate Amino Transf


(AST/SGOT) 31 U/L (15-37) 





 


Alanine Aminotransferase


(ALT/SGPT) 110 U/L


(16-63)


 


Alkaline Phosphatase


 71 U/L


()


 


Total Protein


 5.6 g/dL


(6.4-8.2)


 


Albumin


 2.2 g/dL


(3.4-5.0)


 


Albumin/Globulin Ratio 0.6 (1.0-1.7) 








Medications





Active Scripts








 Medications  Dose


 Route/Sig


 Max Daily Dose Days Date Category Dose


Instructions


 


 Morphine Sulfate


 2 Mg/1 Ml


 Cartridge  2 Mg


 IV PRN Q4HRS PRN


   7/27/21 Reported 


 


 Zinc Sulfate 50


 Mg Tablet  220 Mg


 PO DAILY


   7/27/21 Reported 


 


 Methylprednisolone


 Sod Succ 125 Mg


 Vial  60 Mg


 IJ Q8HRS


   7/27/21 Reported 


 


 Combivent


 Respimat Inhal


  (Ipratropium/Albuterol


 Sulfate) 4 Gm


 Aer.w.adap  2 Inh


 IH QID


   7/27/21 Reported 


 


 Pepcid


  (Famotidine) 20


 Mg Tablet  20 Mg


 PO HS


   7/27/21 Reported 


 


 Lovenox


  (Enoxaparin


 Sodium) 30 Mg/0.3


 Ml Disp.syrin  30 Mg


 SQ DAILY


   7/27/21 Reported 


 


 Vitamin D3 **


  (Vitamin D) 125


 Mcg Capsule  125 Mcg


 PO DAILY


   7/27/21 Reported  5,000 UNITS = 125 MCG


 


 Zyrtec


  (Cetirizine Hcl)


 10 Mg Tablet  1 Tab


 PO DAILY


   7/27/21 Reported 


 


 Ascorbic Acid 500


 Mg Tablet  2 Mg


 PO DAILY


   7/27/21 Reported 


 


 Acetaminophen 325


 Mg Tablet  2 Tab


 PO PRN Q6HRS PRN


  30 7/27/21 Reported 


 


 No Known


 Medications Prior


 To Admisstion


  (Info)  Each  1 Each


 


   8/25/20 Reported 











Impression


.


IMPRESSION:


1.  Acute hypoxemic respiratory failure secondary to COVID-19 viral 

pneumonia./Acute lung injury and early ARDS.  Slowly improving


2.  COVID-19 viral pneumonia.


3.  Abnormal chest x-ray, compatible with viral pneumonia.


4.  Nonspecific anxiety, possible clinical depression





Plan


.


Updated 8/10/21


Continue supplemental oxygen to keep sats above 92% currently on Vapotherm and 

tolerating well.  90% FiO2 and 40 L flow.


S/P remdesivir


Continue steroids with taper


Off antibiotics.


PT/OT


DVT/GI PPX :lovenox 


D/W RN and RT


remains critical but stable and slowly improving





Updated 8/9/21


Continue supplemental oxygen to keep sats above 92% currently on Vapotherm and 

tolerating well.  95% FiO2 and 40 L flow.


S/P remdesivir


Continue steroids with taper


Off antibiotics.


PT/OT


DVT/GI PPX :lovenox 


D/W RN and RT


remains critical but stable





Updated 8/8/21


Continue supplemental oxygen to keep sats above 92% currently back on Vapotherm 

and tolerating well.  100% FiO2 and 40 L flow.


S/P remdesivir


Continue steroids with taper


Off antibiotics.


PT/OT


DVT/GI PPX :lovenox 


D/W RN and RT


remains critical but stable








Updated 8/7/21


Continue supplemental oxygen to keep sats above 92% currently back on Vapotherm 

and tolerating well.  100% FiO2 and 40 L flow.


S/P remdesivir


Continue steroids with taper


Off antibiotics.


PT/OT


DVT/GI PPX :lovenox 


D/W RN and RT








Updated 8/6/21


Continue supplemental oxygen to keep sats above 92% on BIPAP 70%, monitor 

respiratory status closely


S/P remdesivir


Continue steroids currently on 60 mg IV twice daily, with slow taper, will need 

full 10-day course, started on 7/27/2021,taper


PT/OT


DVT/GI PPX :lovenox 


D/W RN and RT


Patient willing to try Vapotherm.  Discussed with RN.





Updated 8/5/21


Continue supplemental oxygen to keep sats above 92% on BIPAP 70%, monitor 

respiratory status closely


S/P remdesivir


Continue steroids currently on 60 mg IV twice daily, with slow taper, will need 

full 10-day course, started on 7/27/2021


PT/OT


DVT/GI PPX :lovenox 


D/W RN and RT


Awaiting LTAC transfer





Updated 8/4/21


Continue supplemental oxygen to keep sats above 92% on BIPAP 70%, monitor 

respiratory status closely


S/P remdesivir


Continue steroids currently on 60 mg IV twice daily, with slow taper, will need 

full 10-day course, started on 7/27/2021


PT/OT


DVT/GI PPX :lovenox 


D/W RN and RT








Updated 8/3/21


Continue supplemental oxygen to keep sats above 92% on BIPAP 75%, monitor 

respiratory status closely


S/P remdesivir


Continue steroids currently on 60 mg IV twice daily, with slow taper, will need 

full 10-day course, started on 7/27/2021


PT/OT


DVT/GI PPX :lovenox 


D/W RN and RT





Updated 8/2/21


Continue supplemental oxygen to keep sats above 92% on BIPAP 75%, monitor 

respiratory status closely


S/P remdesivir


Continue steroids 


Off ABX and ID has signed off


PT/OT


DVT/GI PPX :lovenox 


D/W RN and RT








Updated 8/1


Continue BiPAP at 75%, decrease FiO2 as tolerated


Stressed the importance of sitting up in a chair,


Continue remdesivir, steroids,


As needed Haldol for anxiety


Discussed case with wife per telephone, updated her, answered all questions.











ASHLEY YEAGER MD                 Aug 10, 2021 10:46

## 2021-08-11 VITALS — DIASTOLIC BLOOD PRESSURE: 76 MMHG | SYSTOLIC BLOOD PRESSURE: 120 MMHG

## 2021-08-11 VITALS — DIASTOLIC BLOOD PRESSURE: 81 MMHG | SYSTOLIC BLOOD PRESSURE: 135 MMHG

## 2021-08-11 VITALS — DIASTOLIC BLOOD PRESSURE: 86 MMHG | SYSTOLIC BLOOD PRESSURE: 135 MMHG

## 2021-08-11 VITALS — DIASTOLIC BLOOD PRESSURE: 85 MMHG | SYSTOLIC BLOOD PRESSURE: 145 MMHG

## 2021-08-11 VITALS — DIASTOLIC BLOOD PRESSURE: 95 MMHG | SYSTOLIC BLOOD PRESSURE: 143 MMHG

## 2021-08-11 VITALS — DIASTOLIC BLOOD PRESSURE: 85 MMHG | SYSTOLIC BLOOD PRESSURE: 139 MMHG

## 2021-08-11 LAB
ALBUMIN SERPL-MCNC: 2.2 G/DL (ref 3.4–5)
ALBUMIN/GLOB SERPL: 0.7 {RATIO} (ref 1–1.7)
ALP SERPL-CCNC: 73 U/L (ref 46–116)
ALT SERPL-CCNC: 118 U/L (ref 16–63)
ANION GAP SERPL CALC-SCNC: 6 MMOL/L (ref 6–14)
AST SERPL-CCNC: 37 U/L (ref 15–37)
BASOPHILS # BLD AUTO: 0 X10^3/UL (ref 0–0.2)
BASOPHILS NFR BLD: 0 % (ref 0–3)
BILIRUB SERPL-MCNC: 0.5 MG/DL (ref 0.2–1)
BUN SERPL-MCNC: 26 MG/DL (ref 8–26)
BUN/CREAT SERPL: 33 (ref 6–20)
CALCIUM SERPL-MCNC: 8.4 MG/DL (ref 8.5–10.1)
CHLORIDE SERPL-SCNC: 102 MMOL/L (ref 98–107)
CO2 SERPL-SCNC: 30 MMOL/L (ref 21–32)
CREAT SERPL-MCNC: 0.8 MG/DL (ref 0.7–1.3)
EOSINOPHIL NFR BLD: 0 % (ref 0–3)
EOSINOPHIL NFR BLD: 0 X10^3/UL (ref 0–0.7)
ERYTHROCYTE [DISTWIDTH] IN BLOOD BY AUTOMATED COUNT: 13.2 % (ref 11.5–14.5)
GFR SERPLBLD BASED ON 1.73 SQ M-ARVRAT: 100 ML/MIN
GLUCOSE SERPL-MCNC: 130 MG/DL (ref 70–99)
HCT VFR BLD CALC: 43.9 % (ref 39–53)
HGB BLD-MCNC: 15 G/DL (ref 13–17.5)
LYMPHOCYTES # BLD: 0.4 X10^3/UL (ref 1–4.8)
LYMPHOCYTES NFR BLD AUTO: 2 % (ref 24–48)
MCH RBC QN AUTO: 32 PG (ref 25–35)
MCHC RBC AUTO-ENTMCNC: 34 G/DL (ref 31–37)
MCV RBC AUTO: 93 FL (ref 79–100)
MONO #: 0.5 X10^3/UL (ref 0–1.1)
MONOCYTES NFR BLD: 3 % (ref 0–9)
NEUT #: 15.8 X10^3/UL (ref 1.8–7.7)
NEUTROPHILS NFR BLD AUTO: 94 % (ref 31–73)
PLATELET # BLD AUTO: 126 X10^3/UL (ref 140–400)
POTASSIUM SERPL-SCNC: 4.9 MMOL/L (ref 3.5–5.1)
PROT SERPL-MCNC: 5.4 G/DL (ref 6.4–8.2)
RBC # BLD AUTO: 4.71 X10^6/UL (ref 4.3–5.7)
SODIUM SERPL-SCNC: 138 MMOL/L (ref 136–145)
WBC # BLD AUTO: 16.7 X10^3/UL (ref 4–11)

## 2021-08-11 RX ADMIN — Medication SCH MG: at 10:32

## 2021-08-11 RX ADMIN — MORPHINE SULFATE PRN MG: 2 INJECTION, SOLUTION INTRAMUSCULAR; INTRAVENOUS at 22:56

## 2021-08-11 RX ADMIN — IPRATROPIUM BROMIDE AND ALBUTEROL SCH PUFF: 20; 100 SPRAY, METERED RESPIRATORY (INHALATION) at 13:29

## 2021-08-11 RX ADMIN — MORPHINE SULFATE PRN MG: 2 INJECTION, SOLUTION INTRAMUSCULAR; INTRAVENOUS at 20:47

## 2021-08-11 RX ADMIN — CHOLECALCIFEROL CAP 125 MCG (5000 UNIT) SCH UNIT: 125 CAP at 10:32

## 2021-08-11 RX ADMIN — FAMOTIDINE SCH MG: 20 TABLET ORAL at 20:46

## 2021-08-11 RX ADMIN — IPRATROPIUM BROMIDE AND ALBUTEROL SCH PUFF: 20; 100 SPRAY, METERED RESPIRATORY (INHALATION) at 10:31

## 2021-08-11 RX ADMIN — IPRATROPIUM BROMIDE AND ALBUTEROL SCH PUFF: 20; 100 SPRAY, METERED RESPIRATORY (INHALATION) at 20:00

## 2021-08-11 RX ADMIN — METHYLPREDNISOLONE SODIUM SUCCINATE SCH MG: 40 INJECTION, POWDER, FOR SOLUTION INTRAMUSCULAR; INTRAVENOUS at 05:45

## 2021-08-11 RX ADMIN — CETIRIZINE HYDROCHLORIDE SCH MG: 10 TABLET, FILM COATED ORAL at 10:32

## 2021-08-11 RX ADMIN — IPRATROPIUM BROMIDE AND ALBUTEROL SCH PUFF: 20; 100 SPRAY, METERED RESPIRATORY (INHALATION) at 18:36

## 2021-08-11 RX ADMIN — ZINC SULFATE CAP 220 MG (50 MG ELEMENTAL ZN) SCH MG: 220 (50 ZN) CAP at 10:32

## 2021-08-11 RX ADMIN — ENOXAPARIN SODIUM SCH MG: 40 INJECTION SUBCUTANEOUS at 20:46

## 2021-08-11 RX ADMIN — ACETAMINOPHEN PRN MG: 325 TABLET, FILM COATED ORAL at 11:13

## 2021-08-11 RX ADMIN — ACETAMINOPHEN PRN MG: 325 TABLET, FILM COATED ORAL at 20:46

## 2021-08-11 NOTE — PDOC
[employer] PROGRESS NOTE


Date of Service


DOS:


DATE: 8/11/21 


TIME: 09:18





Chief Complaint


Chief Complaint


SEPSIS, was POA


Severe malnutrition was POA, with obesity, BMI 32


Acute hypoxic respiratory failure


COVID-19 pneumonia r


transaminitis





History of Present Illness


History of Present Illness


Mr Read is a 56-year-old male with no significant past medical history except 

for degenerative joint disease who was transferred from [facility]  

with complaints of fever, headache, dry cough, shortness of breath for the past 

3 days, admitted on 7/26/2021.  Patient stated that he was actually exposed from

his son he was worsening and required to come to the ED at [facility] and he was

found to be hypoxic and required 2 L of nasal cannula oxygen.  Chest x-ray over 

there showed bilateral mild bibasilar infiltrates.  Covid test was positive and 

patient was transferred to [facility] for pulmonary evaluation and treatment.  Patient 

was started on Remdesivir and IV steroids.  Currently patient is requiring 15 L 

high flow nasal cannula and IV steroids.





7/30: No acute events overnight.  Patient was saturating 92% on 15 L nasal 

cannula and he had desatted down to 70% after he had to blow his nose.  Patient 

was placed on BiPAP at 100% FiO2.  Chest leg appears to be worsening.


7/31: Last dose of remdesivir. Could not tolerate vapotherm and is now on BIPAP.


8/1:  poor Po intake, dry,   dark urine


8/2: Desaturates to 72% when he takes off his BiPAP.  Advised to get back on 

BIPAP after eating. he is able to place himself back on. Frustrated with the 

severity of his hypoxia.


8/3: Status post remdesivir. Still requiring BiPAP 75% FiO2 with significant 

desaturations when he is not wearing assessment with nonrebreather O2.


8/4: Still with significant desaturations when he raises BiPAP even on 15 L high

flow nasal cannula.  Discussed transitioning back to Vapotherm if we can treat 

his anxiety.  He is amenable to this.  He is frustrated with staying in the 

hospital understands he may be a longer term COVID recovery and is amenable to 

LTACH referral.  I discussed with pulmonology.


8/5: Afebrile. 70% FiO2 on BiPAP 12/6.  Feels better.  Did not tolerated 

Vapotherm.  Significant desaturations when he transitions to high flow nasal 

cannula O2 while eating.  Discussed referral to LTACH with pulmonology and ID as

it appears he will need a longer COVID recovery.


8/6: Patient on 70% FiO2.  Via BiPAP 12/6 when seen by pulmonology. Feels better

and is seen on vapotherm 40l/min 100% FiO2 with saturations 89%, tolerating 

well.





8/8/2021


patient seen at edge of bed. FiO2 at 89%. Dropped to low 80s while talking to 

us. Educated patient on conserving energy and working to keep FiO2 higher.


D/W RN


Chart Reviewed





8/9/2021


Patient was seen and examined in bed. FiO2 of 87% while on Vapotherm with 40L of

90% O2.


D/W RN, Chart Review


Patient has not had a bowel movement in two days. 


Patient feels he is doing better but his FiO2 still drops with activity or 

speaking


Discussed need to rest and focus on breathing to maintain proper FiO2.





8/10/2021


Patient was resting in bed with NAD


Seen and examined in bed. FiO2 of 92% while on Vapotherm with 40L of 90% O2.


D/W RN


Chart Review


Discussed need to rest and focus on breathing to maintain proper FiO2





8/11/2021


Patient was awake, alert and NAD


Seen and examine in bed.


FiO2 92% while on Vapotherm with 40L of 90% O2


Discussed that we will continue to monitor for clinical improvement





Vitals/I&O


Vitals/I&O:





                                   Vital Signs








  Date Time  Temp Pulse Resp B/P (MAP) Pulse Ox O2 Delivery O2 Flow Rate FiO2


 


8/11/21 08:08     89 VAPOTHERM 40.0 


 


8/11/21 07:00 96.8 87 18 143/95 (111)    





 96.8       














                                    I & O   


 


 8/10/21 8/10/21 8/11/21





 15:00 23:00 07:00


 


Intake Total 240 ml 700 ml 200 ml


 


Output Total 400 ml 300 ml 550 ml


 


Balance -160 ml 400 ml -350 ml











Physical Exam


Physical Exam:








GEN axo3 male on BIPAP


HEENT:  Normocephalic, atraumatic.  Anicteric. 


NECK:  Supple, no JVD.


LUNGS:  Crackles bilaterally.   


HEART:  S1, S2.  


ABDOMEN:  Soft, nontender, nondistended.  Bowel sounds present.


EXTREMITIES:  No edema, no cyanosis.


DERMATOLOGIC:  Warm, dry, no generalized rash.


NEUROLOGIC:  Alert, oriented, grossly nonfocal.


PSYCHIATRIC:  Calm and cooperative. 


 PIV looks clean.


General:  Alert, mild distress


Heart:  Regular rate (tele reg,  reviewed)


Abdomen:  Normal bowel sounds


Extremities:  No clubbing


Skin:  No rashes





Labs


Labs:





Laboratory Tests








Test


 8/11/21


03:00


 


White Blood Count


 16.7 x10^3/uL


(4.0-11.0)


 


Red Blood Count


 4.71 x10^6/uL


(4.30-5.70)


 


Hemoglobin


 15.0 g/dL


(13.0-17.5)


 


Hematocrit


 43.9 %


(39.0-53.0)


 


Mean Corpuscular Volume 93 fL () 


 


Mean Corpuscular Hemoglobin 32 pg (25-35) 


 


Mean Corpuscular Hemoglobin


Concent 34 g/dL


(31-37)


 


Red Cell Distribution Width


 13.2 %


(11.5-14.5)


 


Platelet Count


 126 x10^3/uL


(140-400)


 


Neutrophils (%) (Auto) 94 % (31-73) 


 


Lymphocytes (%) (Auto) 2 % (24-48) 


 


Monocytes (%) (Auto) 3 % (0-9) 


 


Eosinophils (%) (Auto) 0 % (0-3) 


 


Basophils (%) (Auto) 0 % (0-3) 


 


Neutrophils # (Auto)


 15.8 x10^3/uL


(1.8-7.7)


 


Lymphocytes # (Auto)


 0.4 x10^3/uL


(1.0-4.8)


 


Monocytes # (Auto)


 0.5 x10^3/uL


(0.0-1.1)


 


Eosinophils # (Auto)


 0.0 x10^3/uL


(0.0-0.7)


 


Basophils # (Auto)


 0.0 x10^3/uL


(0.0-0.2)


 


Sodium Level


 138 mmol/L


(136-145)


 


Potassium Level


 4.9 mmol/L


(3.5-5.1)


 


Chloride Level


 102 mmol/L


()


 


Carbon Dioxide Level


 30 mmol/L


(21-32)


 


Anion Gap 6 (6-14) 


 


Blood Urea Nitrogen


 26 mg/dL


(8-26)


 


Creatinine


 0.8 mg/dL


(0.7-1.3)


 


Estimated GFR


(Cockcroft-Gault) 100.0 





 


BUN/Creatinine Ratio 33 (6-20) 


 


Glucose Level


 130 mg/dL


(70-99)


 


Calcium Level


 8.4 mg/dL


(8.5-10.1)


 


Total Bilirubin


 0.5 mg/dL


(0.2-1.0)


 


Aspartate Amino Transf


(AST/SGOT) 37 U/L (15-37) 





 


Alanine Aminotransferase


(ALT/SGPT) 118 U/L


(16-63)


 


Alkaline Phosphatase


 73 U/L


()


 


Total Protein


 5.4 g/dL


(6.4-8.2)


 


Albumin


 2.2 g/dL


(3.4-5.0)


 


Albumin/Globulin Ratio 0.7 (1.0-1.7) 











Review of Systems


Review of Systems:


Denies fever or chills


Denies numbess or tingling


Denies heart palpatation or chest pain


Bowel and bladder continent





Assessment and Plan


Assessmemt and Plan


SEPSIS, was POA


Severe malnutrition was POA, with obesity, BMI 32


Acute hypoxic respiratory failure


COVID-19 pneumonia requiring 


transaminitis, 


 


Plan


COVID protocol


Completed IV Remdesivir


Continue IV thiamine and vitamin C


Continue IV steroids


Maintain O2 saturation greater than 92%


Appreciate Pulmonology and Infectious Disease Input


DVT prophylaxis 


Trend labs


PT/OT


Full code


Cardiac monitoring


Prognosis guarded





Comment


Review of Relevant


I have reviewed the following items genesis (where applicable) has been applied.





Justifications for Admission


Other Justification














SHIRIN SCHNEIDER III DO           Aug 11, 2021 09:23

## 2021-08-11 NOTE — RAD
Exam: Chest one view



INDICATION: Chest pain



TECHNIQUE: Frontal view of the chest



Comparisons: 8/7/2021



FINDINGS:

The cardiomediastinal silhouette and pulmonary vessels are within normal limits.



Patchy bilateral airspace disease. No pleural effusion.



IMPRESSION:

Patchy bilateral airspace disease, similar to mildly increased when compared to prior study.



Electronically signed by: Radha Guallpa MD (8/11/2021 10:21 PM) ROJELIO

## 2021-08-11 NOTE — PDOC
PULMONARY PROGRESS NOTES


DATE: 8/11/21 


TIME: 09:53


Subjective


Patient remains on Vapotherm 40 L, 90%


No increased shortness of breath or cough


Afebrile


No overnight concerns per nursing.


Vitals





Vital Signs








  Date Time  Temp Pulse Resp B/P (MAP) Pulse Ox O2 Delivery O2 Flow Rate FiO2


 


8/11/21 08:08     89 VAPOTHERM 40.0 


 


8/11/21 07:00 96.8 87 18 143/95 (111)    





 96.8       








Comments


Pt. seen during covid -19 pandemic visual exam preformed 


no distress 


Vapotherm 


no obvious rash or edema


Labs





Laboratory Tests








Test


 8/10/21


03:34 8/11/21


03:00


 


White Blood Count


 14.2 x10^3/uL


(4.0-11.0) 16.7 x10^3/uL


(4.0-11.0)


 


Red Blood Count


 4.78 x10^6/uL


(4.30-5.70) 4.71 x10^6/uL


(4.30-5.70)


 


Hemoglobin


 15.1 g/dL


(13.0-17.5) 15.0 g/dL


(13.0-17.5)


 


Hematocrit


 44.4 %


(39.0-53.0) 43.9 %


(39.0-53.0)


 


Mean Corpuscular Volume 93 fL ()  93 fL () 


 


Mean Corpuscular Hemoglobin 32 pg (25-35)  32 pg (25-35) 


 


Mean Corpuscular Hemoglobin


Concent 34 g/dL


(31-37) 34 g/dL


(31-37)


 


Red Cell Distribution Width


 13.2 %


(11.5-14.5) 13.2 %


(11.5-14.5)


 


Platelet Count


 118 x10^3/uL


(140-400) 126 x10^3/uL


(140-400)


 


Neutrophils (%) (Auto) 94 % (31-73)  94 % (31-73) 


 


Lymphocytes (%) (Auto) 3 % (24-48)  2 % (24-48) 


 


Monocytes (%) (Auto) 3 % (0-9)  3 % (0-9) 


 


Eosinophils (%) (Auto) 0 % (0-3)  0 % (0-3) 


 


Basophils (%) (Auto) 0 % (0-3)  0 % (0-3) 


 


Neutrophils # (Auto)


 13.4 x10^3/uL


(1.8-7.7) 15.8 x10^3/uL


(1.8-7.7)


 


Lymphocytes # (Auto)


 0.4 x10^3/uL


(1.0-4.8) 0.4 x10^3/uL


(1.0-4.8)


 


Monocytes # (Auto)


 0.4 x10^3/uL


(0.0-1.1) 0.5 x10^3/uL


(0.0-1.1)


 


Eosinophils # (Auto)


 0.0 x10^3/uL


(0.0-0.7) 0.0 x10^3/uL


(0.0-0.7)


 


Basophils # (Auto)


 0.0 x10^3/uL


(0.0-0.2) 0.0 x10^3/uL


(0.0-0.2)


 


Sodium Level


 138 mmol/L


(136-145) 138 mmol/L


(136-145)


 


Potassium Level


 4.3 mmol/L


(3.5-5.1) 4.9 mmol/L


(3.5-5.1)


 


Chloride Level


 104 mmol/L


() 102 mmol/L


()


 


Carbon Dioxide Level


 30 mmol/L


(21-32) 30 mmol/L


(21-32)


 


Anion Gap 4 (6-14)  6 (6-14) 


 


Blood Urea Nitrogen


 24 mg/dL


(8-26) 26 mg/dL


(8-26)


 


Creatinine


 0.7 mg/dL


(0.7-1.3) 0.8 mg/dL


(0.7-1.3)


 


Estimated GFR


(Cockcroft-Gault) 116.7 


 100.0 





 


BUN/Creatinine Ratio 34 (6-20)  33 (6-20) 


 


Glucose Level


 130 mg/dL


(70-99) 130 mg/dL


(70-99)


 


Calcium Level


 8.5 mg/dL


(8.5-10.1) 8.4 mg/dL


(8.5-10.1)


 


Total Bilirubin


 0.5 mg/dL


(0.2-1.0) 0.5 mg/dL


(0.2-1.0)


 


Aspartate Amino Transf


(AST/SGOT) 31 U/L (15-37) 


 37 U/L (15-37) 





 


Alanine Aminotransferase


(ALT/SGPT) 110 U/L


(16-63) 118 U/L


(16-63)


 


Alkaline Phosphatase


 71 U/L


() 73 U/L


()


 


Total Protein


 5.6 g/dL


(6.4-8.2) 5.4 g/dL


(6.4-8.2)


 


Albumin


 2.2 g/dL


(3.4-5.0) 2.2 g/dL


(3.4-5.0)


 


Albumin/Globulin Ratio 0.6 (1.0-1.7)  0.7 (1.0-1.7) 








Laboratory Tests








Test


 8/11/21


03:00


 


White Blood Count


 16.7 x10^3/uL


(4.0-11.0)


 


Red Blood Count


 4.71 x10^6/uL


(4.30-5.70)


 


Hemoglobin


 15.0 g/dL


(13.0-17.5)


 


Hematocrit


 43.9 %


(39.0-53.0)


 


Mean Corpuscular Volume 93 fL () 


 


Mean Corpuscular Hemoglobin 32 pg (25-35) 


 


Mean Corpuscular Hemoglobin


Concent 34 g/dL


(31-37)


 


Red Cell Distribution Width


 13.2 %


(11.5-14.5)


 


Platelet Count


 126 x10^3/uL


(140-400)


 


Neutrophils (%) (Auto) 94 % (31-73) 


 


Lymphocytes (%) (Auto) 2 % (24-48) 


 


Monocytes (%) (Auto) 3 % (0-9) 


 


Eosinophils (%) (Auto) 0 % (0-3) 


 


Basophils (%) (Auto) 0 % (0-3) 


 


Neutrophils # (Auto)


 15.8 x10^3/uL


(1.8-7.7)


 


Lymphocytes # (Auto)


 0.4 x10^3/uL


(1.0-4.8)


 


Monocytes # (Auto)


 0.5 x10^3/uL


(0.0-1.1)


 


Eosinophils # (Auto)


 0.0 x10^3/uL


(0.0-0.7)


 


Basophils # (Auto)


 0.0 x10^3/uL


(0.0-0.2)


 


Sodium Level


 138 mmol/L


(136-145)


 


Potassium Level


 4.9 mmol/L


(3.5-5.1)


 


Chloride Level


 102 mmol/L


()


 


Carbon Dioxide Level


 30 mmol/L


(21-32)


 


Anion Gap 6 (6-14) 


 


Blood Urea Nitrogen


 26 mg/dL


(8-26)


 


Creatinine


 0.8 mg/dL


(0.7-1.3)


 


Estimated GFR


(Cockcroft-Gault) 100.0 





 


BUN/Creatinine Ratio 33 (6-20) 


 


Glucose Level


 130 mg/dL


(70-99)


 


Calcium Level


 8.4 mg/dL


(8.5-10.1)


 


Total Bilirubin


 0.5 mg/dL


(0.2-1.0)


 


Aspartate Amino Transf


(AST/SGOT) 37 U/L (15-37) 





 


Alanine Aminotransferase


(ALT/SGPT) 118 U/L


(16-63)


 


Alkaline Phosphatase


 73 U/L


()


 


Total Protein


 5.4 g/dL


(6.4-8.2)


 


Albumin


 2.2 g/dL


(3.4-5.0)


 


Albumin/Globulin Ratio 0.7 (1.0-1.7) 








Medications





Active Scripts








 Medications  Dose


 Route/Sig


 Max Daily Dose Days Date Category Dose


Instructions


 


 Morphine Sulfate


 2 Mg/1 Ml


 Cartridge  2 Mg


 IV PRN Q4HRS PRN


   7/27/21 Reported 


 


 Zinc Sulfate 50


 Mg Tablet  220 Mg


 PO DAILY


   7/27/21 Reported 


 


 Methylprednisolone


 Sod Succ 125 Mg


 Vial  60 Mg


 IJ Q8HRS


   7/27/21 Reported 


 


 Combivent


 Respimat Inhal


  (Ipratropium/Albuterol


 Sulfate) 4 Gm


 Aer.w.adap  2 Inh


 IH QID


   7/27/21 Reported 


 


 Pepcid


  (Famotidine) 20


 Mg Tablet  20 Mg


 PO HS


   7/27/21 Reported 


 


 Lovenox


  (Enoxaparin


 Sodium) 30 Mg/0.3


 Ml Disp.syrin  30 Mg


 SQ DAILY


   7/27/21 Reported 


 


 Vitamin D3 **


  (Vitamin D) 125


 Mcg Capsule  125 Mcg


 PO DAILY


   7/27/21 Reported  5,000 UNITS = 125 MCG


 


 Zyrtec


  (Cetirizine Hcl)


 10 Mg Tablet  1 Tab


 PO DAILY


   7/27/21 Reported 


 


 Ascorbic Acid 500


 Mg Tablet  2 Mg


 PO DAILY


   7/27/21 Reported 


 


 Acetaminophen 325


 Mg Tablet  2 Tab


 PO PRN Q6HRS PRN


  30 7/27/21 Reported 


 


 No Known


 Medications Prior


 To Admisstion


  (Info)  Each  1 Each


 


   8/25/20 Reported 











Impression


.


IMPRESSION:


1.  Acute hypoxemic respiratory failure secondary to COVID-19 viral pne

umonia./Acute lung injury and early ARDS.  Slowly improving


2.  COVID-19 viral pneumonia.


3.  Abnormal chest x-ray, compatible with viral pneumonia.


4.  Nonspecific anxiety, possible clinical depression





Plan


.


Updated 8/11/21


Continue supplemental oxygen to keep sats above 92% currently on Vapotherm, 

currently on 90% and 40 liters will slowly wean as tolerated 


S/P remdesivir


Continue steroids with taper, now on daily dosing 


PT/OT


DVT/GI PPX :lovenox 


D/W RN and RT


Social work for DC planning 





Updated 8/10/21


Continue supplemental oxygen to keep sats above 92% currently on Vapotherm and 

tolerating well.  90% FiO2 and 40 L flow.


S/P remdesivir


Continue steroids with taper


Off antibiotics.


PT/OT


DVT/GI PPX :lovenox 


D/W RN and RT


remains critical but stable and slowly improving





Updated 8/9/21


Continue supplemental oxygen to keep sats above 92% currently on Vapotherm and 

tolerating well.  95% FiO2 and 40 L flow.


S/P remdesivir


Continue steroids with taper


Off antibiotics.


PT/OT


DVT/GI PPX :lovenox 


D/W RN and RT


remains critical but stable











ASHLEY YEAGER MD                 Aug 11, 2021 09:56

## 2021-08-11 NOTE — NUR
SS following up with discharge planning. SS reviewed pt chart and discussed with pt RN. Pt 
is currently on Vapotherm at 40 liters today. COVID19 positive. Pt on IV Solu Medrol. PT/OT 
recommended home. [employer] denied pt for LTACH placement. SS will continue to follow for 
discharge planning.

## 2021-08-12 VITALS — SYSTOLIC BLOOD PRESSURE: 126 MMHG | DIASTOLIC BLOOD PRESSURE: 88 MMHG

## 2021-08-12 VITALS — SYSTOLIC BLOOD PRESSURE: 127 MMHG | DIASTOLIC BLOOD PRESSURE: 73 MMHG

## 2021-08-12 VITALS — DIASTOLIC BLOOD PRESSURE: 89 MMHG | SYSTOLIC BLOOD PRESSURE: 140 MMHG

## 2021-08-12 VITALS — DIASTOLIC BLOOD PRESSURE: 89 MMHG | SYSTOLIC BLOOD PRESSURE: 145 MMHG

## 2021-08-12 VITALS — DIASTOLIC BLOOD PRESSURE: 85 MMHG | SYSTOLIC BLOOD PRESSURE: 146 MMHG

## 2021-08-12 LAB
% LYMPHS: 2 % (ref 24–48)
% MONOS: 4 % (ref 0–10)
% SEGS: 93 % (ref 35–66)
ALBUMIN SERPL-MCNC: 2.4 G/DL (ref 3.4–5)
ALBUMIN/GLOB SERPL: 0.7 {RATIO} (ref 1–1.7)
ALP SERPL-CCNC: 64 U/L (ref 46–116)
ALT SERPL-CCNC: 101 U/L (ref 16–63)
ANION GAP SERPL CALC-SCNC: 4 MMOL/L (ref 6–14)
AST SERPL-CCNC: 22 U/L (ref 15–37)
BASOPHILS # BLD AUTO: 0 X10^3/UL (ref 0–0.2)
BASOPHILS NFR BLD: 0 % (ref 0–3)
BILIRUB SERPL-MCNC: 0.7 MG/DL (ref 0.2–1)
BUN SERPL-MCNC: 33 MG/DL (ref 8–26)
BUN/CREAT SERPL: 41 (ref 6–20)
CALCIUM SERPL-MCNC: 8.7 MG/DL (ref 8.5–10.1)
CHLORIDE SERPL-SCNC: 100 MMOL/L (ref 98–107)
CO2 SERPL-SCNC: 32 MMOL/L (ref 21–32)
CREAT SERPL-MCNC: 0.8 MG/DL (ref 0.7–1.3)
EOSINOPHIL NFR BLD AUTO: 1 % (ref 0–5)
EOSINOPHIL NFR BLD: 0.1 X10^3/UL (ref 0–0.7)
EOSINOPHIL NFR BLD: 1 % (ref 0–3)
ERYTHROCYTE [DISTWIDTH] IN BLOOD BY AUTOMATED COUNT: 13.7 % (ref 11.5–14.5)
GFR SERPLBLD BASED ON 1.73 SQ M-ARVRAT: 100 ML/MIN
GLUCOSE SERPL-MCNC: 104 MG/DL (ref 70–99)
HCT VFR BLD CALC: 46.4 % (ref 39–53)
HGB BLD-MCNC: 15.5 G/DL (ref 13–17.5)
LYMPHOCYTES # BLD: 0.9 X10^3/UL (ref 1–4.8)
LYMPHOCYTES NFR BLD AUTO: 5 % (ref 24–48)
MCH RBC QN AUTO: 32 PG (ref 25–35)
MCHC RBC AUTO-ENTMCNC: 33 G/DL (ref 31–37)
MCV RBC AUTO: 96 FL (ref 79–100)
MONO #: 0.5 X10^3/UL (ref 0–1.1)
MONOCYTES NFR BLD: 3 % (ref 0–9)
NEUT #: 15.4 X10^3/UL (ref 1.8–7.7)
NEUTROPHILS NFR BLD AUTO: 91 % (ref 31–73)
PLATELET # BLD AUTO: 129 X10^3/UL (ref 140–400)
PLATELET # BLD EST: ADEQUATE 10*3/UL
POTASSIUM SERPL-SCNC: 4 MMOL/L (ref 3.5–5.1)
PROT SERPL-MCNC: 5.8 G/DL (ref 6.4–8.2)
RBC # BLD AUTO: 4.86 X10^6/UL (ref 4.3–5.7)
SODIUM SERPL-SCNC: 136 MMOL/L (ref 136–145)
WBC # BLD AUTO: 17 X10^3/UL (ref 4–11)

## 2021-08-12 RX ADMIN — METHYLPREDNISOLONE SODIUM SUCCINATE SCH MG: 125 INJECTION, POWDER, FOR SOLUTION INTRAMUSCULAR; INTRAVENOUS at 08:55

## 2021-08-12 RX ADMIN — Medication SCH MG: at 08:49

## 2021-08-12 RX ADMIN — METHYLPREDNISOLONE SODIUM SUCCINATE SCH MG: 125 INJECTION, POWDER, FOR SOLUTION INTRAMUSCULAR; INTRAVENOUS at 20:47

## 2021-08-12 RX ADMIN — ENOXAPARIN SODIUM SCH MG: 40 INJECTION SUBCUTANEOUS at 20:46

## 2021-08-12 RX ADMIN — CHOLECALCIFEROL CAP 125 MCG (5000 UNIT) SCH UNIT: 125 CAP at 08:49

## 2021-08-12 RX ADMIN — IPRATROPIUM BROMIDE AND ALBUTEROL SCH PUFF: 20; 100 SPRAY, METERED RESPIRATORY (INHALATION) at 16:00

## 2021-08-12 RX ADMIN — CETIRIZINE HYDROCHLORIDE SCH MG: 10 TABLET, FILM COATED ORAL at 08:49

## 2021-08-12 RX ADMIN — ZINC SULFATE CAP 220 MG (50 MG ELEMENTAL ZN) SCH MG: 220 (50 ZN) CAP at 08:49

## 2021-08-12 RX ADMIN — IPRATROPIUM BROMIDE AND ALBUTEROL SCH PUFF: 20; 100 SPRAY, METERED RESPIRATORY (INHALATION) at 12:00

## 2021-08-12 RX ADMIN — ACETAMINOPHEN PRN MG: 325 TABLET, FILM COATED ORAL at 06:13

## 2021-08-12 RX ADMIN — IPRATROPIUM BROMIDE AND ALBUTEROL SCH PUFF: 20; 100 SPRAY, METERED RESPIRATORY (INHALATION) at 21:08

## 2021-08-12 RX ADMIN — ACETAMINOPHEN PRN MG: 325 TABLET, FILM COATED ORAL at 20:46

## 2021-08-12 RX ADMIN — IPRATROPIUM BROMIDE AND ALBUTEROL SCH PUFF: 20; 100 SPRAY, METERED RESPIRATORY (INHALATION) at 08:00

## 2021-08-12 RX ADMIN — FAMOTIDINE SCH MG: 20 TABLET ORAL at 20:46

## 2021-08-12 NOTE — PDOC
PULMONARY PROGRESS NOTES


DATE: 8/12/21 


TIME: 08:18


Subjective


Patient remains on Vapotherm 40 L, 90%


Reports episodes of shortness of breath with ambulation overnight, and fatigue 

this morning


Vitals





Vital Signs








  Date Time  Temp Pulse Resp B/P (MAP) Pulse Ox O2 Delivery O2 Flow Rate FiO2


 


8/12/21 06:04     94 VAPOTHERM 40.0 


 


8/12/21 03:01 98.0 83 32 140/89 (106)    





 98.0       








Comments


Pt. seen during covid -19 pandemic visual exam preformed 


no distress 


Vapotherm 90%


no obvious rash or edema


Labs





Laboratory Tests








Test


 8/11/21


03:00 8/12/21


05:20


 


White Blood Count


 16.7 x10^3/uL


(4.0-11.0) 17.0 x10^3/uL


(4.0-11.0)


 


Red Blood Count


 4.71 x10^6/uL


(4.30-5.70) 4.86 x10^6/uL


(4.30-5.70)


 


Hemoglobin


 15.0 g/dL


(13.0-17.5) 15.5 g/dL


(13.0-17.5)


 


Hematocrit


 43.9 %


(39.0-53.0) 46.4 %


(39.0-53.0)


 


Mean Corpuscular Volume 93 fL ()  96 fL () 


 


Mean Corpuscular Hemoglobin 32 pg (25-35)  32 pg (25-35) 


 


Mean Corpuscular Hemoglobin


Concent 34 g/dL


(31-37) 33 g/dL


(31-37)


 


Red Cell Distribution Width


 13.2 %


(11.5-14.5) 13.7 %


(11.5-14.5)


 


Platelet Count


 126 x10^3/uL


(140-400) 129 x10^3/uL


(140-400)


 


Neutrophils (%) (Auto) 94 % (31-73)  91 % (31-73) 


 


Lymphocytes (%) (Auto) 2 % (24-48)  5 % (24-48) 


 


Monocytes (%) (Auto) 3 % (0-9)  3 % (0-9) 


 


Eosinophils (%) (Auto) 0 % (0-3)  1 % (0-3) 


 


Basophils (%) (Auto) 0 % (0-3)  0 % (0-3) 


 


Neutrophils # (Auto)


 15.8 x10^3/uL


(1.8-7.7) 15.4 x10^3/uL


(1.8-7.7)


 


Lymphocytes # (Auto)


 0.4 x10^3/uL


(1.0-4.8) 0.9 x10^3/uL


(1.0-4.8)


 


Monocytes # (Auto)


 0.5 x10^3/uL


(0.0-1.1) 0.5 x10^3/uL


(0.0-1.1)


 


Eosinophils # (Auto)


 0.0 x10^3/uL


(0.0-0.7) 0.1 x10^3/uL


(0.0-0.7)


 


Basophils # (Auto)


 0.0 x10^3/uL


(0.0-0.2) 0.0 x10^3/uL


(0.0-0.2)


 


Sodium Level


 138 mmol/L


(136-145) 136 mmol/L


(136-145)


 


Potassium Level


 4.9 mmol/L


(3.5-5.1) 4.0 mmol/L


(3.5-5.1)


 


Chloride Level


 102 mmol/L


() 100 mmol/L


()


 


Carbon Dioxide Level


 30 mmol/L


(21-32) 32 mmol/L


(21-32)


 


Anion Gap 6 (6-14)  4 (6-14) 


 


Blood Urea Nitrogen


 26 mg/dL


(8-26) 33 mg/dL


(8-26)


 


Creatinine


 0.8 mg/dL


(0.7-1.3) 0.8 mg/dL


(0.7-1.3)


 


Estimated GFR


(Cockcroft-Gault) 100.0 


 100.0 





 


BUN/Creatinine Ratio 33 (6-20)  41 (6-20) 


 


Glucose Level


 130 mg/dL


(70-99) 104 mg/dL


(70-99)


 


Calcium Level


 8.4 mg/dL


(8.5-10.1) 8.7 mg/dL


(8.5-10.1)


 


Total Bilirubin


 0.5 mg/dL


(0.2-1.0) 0.7 mg/dL


(0.2-1.0)


 


Aspartate Amino Transf


(AST/SGOT) 37 U/L (15-37) 


 22 U/L (15-37) 





 


Alanine Aminotransferase


(ALT/SGPT) 118 U/L


(16-63) 101 U/L


(16-63)


 


Alkaline Phosphatase


 73 U/L


() 64 U/L


()


 


Total Protein


 5.4 g/dL


(6.4-8.2) 5.8 g/dL


(6.4-8.2)


 


Albumin


 2.2 g/dL


(3.4-5.0) 2.4 g/dL


(3.4-5.0)


 


Albumin/Globulin Ratio 0.7 (1.0-1.7)  0.7 (1.0-1.7) 








Laboratory Tests








Test


 8/12/21


05:20


 


White Blood Count


 17.0 x10^3/uL


(4.0-11.0)


 


Red Blood Count


 4.86 x10^6/uL


(4.30-5.70)


 


Hemoglobin


 15.5 g/dL


(13.0-17.5)


 


Hematocrit


 46.4 %


(39.0-53.0)


 


Mean Corpuscular Volume 96 fL () 


 


Mean Corpuscular Hemoglobin 32 pg (25-35) 


 


Mean Corpuscular Hemoglobin


Concent 33 g/dL


(31-37)


 


Red Cell Distribution Width


 13.7 %


(11.5-14.5)


 


Platelet Count


 129 x10^3/uL


(140-400)


 


Neutrophils (%) (Auto) 91 % (31-73) 


 


Lymphocytes (%) (Auto) 5 % (24-48) 


 


Monocytes (%) (Auto) 3 % (0-9) 


 


Eosinophils (%) (Auto) 1 % (0-3) 


 


Basophils (%) (Auto) 0 % (0-3) 


 


Neutrophils # (Auto)


 15.4 x10^3/uL


(1.8-7.7)


 


Lymphocytes # (Auto)


 0.9 x10^3/uL


(1.0-4.8)


 


Monocytes # (Auto)


 0.5 x10^3/uL


(0.0-1.1)


 


Eosinophils # (Auto)


 0.1 x10^3/uL


(0.0-0.7)


 


Basophils # (Auto)


 0.0 x10^3/uL


(0.0-0.2)


 


Sodium Level


 136 mmol/L


(136-145)


 


Potassium Level


 4.0 mmol/L


(3.5-5.1)


 


Chloride Level


 100 mmol/L


()


 


Carbon Dioxide Level


 32 mmol/L


(21-32)


 


Anion Gap 4 (6-14) 


 


Blood Urea Nitrogen


 33 mg/dL


(8-26)


 


Creatinine


 0.8 mg/dL


(0.7-1.3)


 


Estimated GFR


(Cockcroft-Gault) 100.0 





 


BUN/Creatinine Ratio 41 (6-20) 


 


Glucose Level


 104 mg/dL


(70-99)


 


Calcium Level


 8.7 mg/dL


(8.5-10.1)


 


Total Bilirubin


 0.7 mg/dL


(0.2-1.0)


 


Aspartate Amino Transf


(AST/SGOT) 22 U/L (15-37) 





 


Alanine Aminotransferase


(ALT/SGPT) 101 U/L


(16-63)


 


Alkaline Phosphatase


 64 U/L


()


 


Total Protein


 5.8 g/dL


(6.4-8.2)


 


Albumin


 2.4 g/dL


(3.4-5.0)


 


Albumin/Globulin Ratio 0.7 (1.0-1.7) 








Medications





Active Scripts








 Medications  Dose


 Route/Sig


 Max Daily Dose Days Date Category Dose


Instructions


 


 Morphine Sulfate


 2 Mg/1 Ml


 Cartridge  2 Mg


 IV PRN Q4HRS PRN


   7/27/21 Reported 


 


 Zinc Sulfate 50


 Mg Tablet  220 Mg


 PO DAILY


   7/27/21 Reported 


 


 Methylprednisolone


 Sod Succ 125 Mg


 Vial  60 Mg


 IJ Q8HRS


   7/27/21 Reported 


 


 Combivent


 Respimat Inhal


  (Ipratropium/Albuterol


 Sulfate) 4 Gm


 Aer.w.adap  2 Inh


 IH QID


   7/27/21 Reported 


 


 Pepcid


  (Famotidine) 20


 Mg Tablet  20 Mg


 PO HS


   7/27/21 Reported 


 


 Lovenox


  (Enoxaparin


 Sodium) 30 Mg/0.3


 Ml Disp.syrin  30 Mg


 SQ DAILY


   7/27/21 Reported 


 


 Vitamin D3 **


  (Vitamin D) 125


 Mcg Capsule  125 Mcg


 PO DAILY


   7/27/21 Reported  5,000 UNITS = 125 MCG


 


 Zyrtec


  (Cetirizine Hcl)


 10 Mg Tablet  1 Tab


 PO DAILY


   7/27/21 Reported 


 


 Ascorbic Acid 500


 Mg Tablet  2 Mg


 PO DAILY


   7/27/21 Reported 


 


 Acetaminophen 325


 Mg Tablet  2 Tab


 PO PRN Q6HRS PRN


  30 7/27/21 Reported 


 


 No Known


 Medications Prior


 To Admisstion


  (Info)  Each  1 Each


 


   8/25/20 Reported 











Impression


.


IMPRESSION:


1.  Acute hypoxemic respiratory failure secondary to COVID-19 viral 

pneumonia./Acute lung injury and early ARDS.  Slowly improving


2.  COVID-19 viral pneumonia.


3.  Abnormal chest x-ray, compatible with viral pneumonia.


4.  Nonspecific anxiety, possible clinical depression





Plan


.


Updated 8/12/21


Continue supplemental oxygen to keep sats above 92% currently on Vapotherm, 

currently on 90% and 40 liters will slowly wean as tolerated, slowly improving


Symptomatic treatment of cough


Continue steroids with taper


PT/OT


DVT/GI PPX :lovenox 


D/W RN and RT


Social work for DC planning 





Updated 8/11/21


Continue supplemental oxygen to keep sats above 92% currently on Vapotherm, 

currently on 90% and 40 liters will slowly wean as tolerated 


S/P remdesivir


Continue steroids with taper, now on daily dosing 


PT/OT


DVT/GI PPX :lovenox 


D/W RN and RT


Social work for DC planning 





Updated 8/10/21


Continue supplemental oxygen to keep sats above 92% currently on Vapotherm and 

tolerating well.  90% FiO2 and 40 L flow.


S/P remdesivir


Continue steroids with taper


Off antibiotics.


PT/OT


DVT/GI PPX :lovenox 


D/W RN and RT


remains critical but stable and slowly improving











ECHO PADGETT        Aug 12, 2021 08:21

## 2021-08-12 NOTE — PDOC
[employer] PROGRESS NOTE


Date of Service


DOS:


DATE: 8/12/21 


TIME: 11:05





Chief Complaint


Chief Complaint


SEPSIS, was POA


Severe malnutrition was POA, with obesity, BMI 32


Acute hypoxic respiratory failure


COVID-19 pneumonia r


transaminitis





History of Present Illness


History of Present Illness


Mr Read is a 56-year-old male with no significant past medical history except 

for degenerative joint disease who was transferred from [facility]  

with complaints of fever, headache, dry cough, shortness of breath for the past 

3 days, admitted on 7/26/2021.  Patient stated that he was actually exposed from

his son he was worsening and required to come to the ED at [facility] and he was

found to be hypoxic and required 2 L of nasal cannula oxygen.  Chest x-ray over 

there showed bilateral mild bibasilar infiltrates.  Covid test was positive and 

patient was transferred to [facility] for pulmonary evaluation and treatment.  Patient 

was started on Remdesivir and IV steroids.  Currently patient is requiring 15 L 

high flow nasal cannula and IV steroids.





7/30: No acute events overnight.  Patient was saturating 92% on 15 L nasal 

cannula and he had desatted down to 70% after he had to blow his nose.  Patient 

was placed on BiPAP at 100% FiO2.  Chest leg appears to be worsening.


7/31: Last dose of remdesivir. Could not tolerate vapotherm and is now on BIPAP.


8/1:  poor Po intake, dry,   dark urine


8/2: Desaturates to 72% when he takes off his BiPAP.  Advised to get back on 

BIPAP after eating. he is able to place himself back on. Frustrated with the 

severity of his hypoxia.


8/3: Status post remdesivir. Still requiring BiPAP 75% FiO2 with significant 

desaturations when he is not wearing assessment with nonrebreather O2.


8/4: Still with significant desaturations when he raises BiPAP even on 15 L high

flow nasal cannula.  Discussed transitioning back to Vapotherm if we can treat 

his anxiety.  He is amenable to this.  He is frustrated with staying in the 

hospital understands he may be a longer term COVID recovery and is amenable to 

LTACH referral.  I discussed with pulmonology.


8/5: Afebrile. 70% FiO2 on BiPAP 12/6.  Feels better.  Did not tolerated 

Vapotherm.  Significant desaturations when he transitions to high flow nasal 

cannula O2 while eating.  Discussed referral to LTACH with pulmonology and ID as

it appears he will need a longer COVID recovery.


8/6: Patient on 70% FiO2.  Via BiPAP 12/6 when seen by pulmonology. Feels better

and is seen on vapotherm 40l/min 100% FiO2 with saturations 89%, tolerating 

well.





8/8/2021


patient seen at edge of bed. FiO2 at 89%. Dropped to low 80s while talking to 

us. Educated patient on conserving energy and working to keep FiO2 higher.


D/W RN


Chart Reviewed





8/9/2021


Patient was seen and examined in bed. FiO2 of 87% while on Vapotherm with 40L of

90% O2.


D/W RN, Chart Review


Patient has not had a bowel movement in two days. 


Patient feels he is doing better but his FiO2 still drops with activity or 

speaking


Discussed need to rest and focus on breathing to maintain proper FiO2.





8/10/2021


Patient was resting in bed with NAD


Seen and examined in bed. FiO2 of 92% while on Vapotherm with 40L of 90% O2.


D/W RN


Chart Review


Discussed need to rest and focus on breathing to maintain proper FiO2





8/11/2021


Patient was awake, alert and NAD


Seen and examine in bed.


FiO2 92% while on Vapotherm with 40L of 90% O2


Discussed that we will continue to monitor for clinical improvement





8/12/2021


Patient resting upon entry into room. Upon exam in bed he was awake, alert and 

NAD


FiO2 90% while on Vapotherm with 40L of 90% O2


Discussed continued monitoring and that he is making slow improvements.


Patient was encouraged by improvements and eager to return to as high of 

function as possible.





Vitals/I&O


Vitals/I&O:





                                   Vital Signs








  Date Time  Temp Pulse Resp B/P (MAP) Pulse Ox O2 Delivery O2 Flow Rate FiO2


 


8/12/21 08:57     95 VAPOTHERM 40.0 


 


8/12/21 07:00 98.3 90 24 127/73 (91)    





 98.3       














                                    I & O   


 


 8/11/21 8/11/21 8/12/21





 15:00 23:00 07:00


 


Intake Total 540 ml 380 ml 0 ml


 


Output Total 850 ml 780 ml 600 ml


 


Balance -310 ml -400 ml -600 ml











Physical Exam


Physical Exam:








GEN axo3 male on BIPAP


HEENT:  Normocephalic, atraumatic.  Anicteric. 


NECK:  Supple, no JVD.


LUNGS:  Crackles bilaterally.   


HEART:  S1, S2.  


ABDOMEN:  Soft, nontender, nondistended.  Bowel sounds present.


EXTREMITIES:  No edema, no cyanosis.


DERMATOLOGIC:  Warm, dry, no generalized rash.


NEUROLOGIC:  Alert, oriented, grossly nonfocal.


PSYCHIATRIC:  Calm and cooperative. 


 PIV looks clean.


General:  Alert, mild distress


Heart:  Regular rate (tele reg,  reviewed)


Abdomen:  Normal bowel sounds


Extremities:  No clubbing


Skin:  No rashes





Labs


Labs:





Laboratory Tests








Test


 8/12/21


05:20


 


White Blood Count


 17.0 x10^3/uL


(4.0-11.0)


 


Red Blood Count


 4.86 x10^6/uL


(4.30-5.70)


 


Hemoglobin


 15.5 g/dL


(13.0-17.5)


 


Hematocrit


 46.4 %


(39.0-53.0)


 


Mean Corpuscular Volume 96 fL () 


 


Mean Corpuscular Hemoglobin 32 pg (25-35) 


 


Mean Corpuscular Hemoglobin


Concent 33 g/dL


(31-37)


 


Red Cell Distribution Width


 13.7 %


(11.5-14.5)


 


Platelet Count


 129 x10^3/uL


(140-400)


 


Neutrophils (%) (Auto) 91 % (31-73) 


 


Lymphocytes (%) (Auto) 5 % (24-48) 


 


Monocytes (%) (Auto) 3 % (0-9) 


 


Eosinophils (%) (Auto) 1 % (0-3) 


 


Basophils (%) (Auto) 0 % (0-3) 


 


Neutrophils # (Auto)


 15.4 x10^3/uL


(1.8-7.7)


 


Lymphocytes # (Auto)


 0.9 x10^3/uL


(1.0-4.8)


 


Monocytes # (Auto)


 0.5 x10^3/uL


(0.0-1.1)


 


Eosinophils # (Auto)


 0.1 x10^3/uL


(0.0-0.7)


 


Basophils # (Auto)


 0.0 x10^3/uL


(0.0-0.2)


 


Sodium Level


 136 mmol/L


(136-145)


 


Potassium Level


 4.0 mmol/L


(3.5-5.1)


 


Chloride Level


 100 mmol/L


()


 


Carbon Dioxide Level


 32 mmol/L


(21-32)


 


Anion Gap 4 (6-14) 


 


Blood Urea Nitrogen


 33 mg/dL


(8-26)


 


Creatinine


 0.8 mg/dL


(0.7-1.3)


 


Estimated GFR


(Cockcroft-Gault) 100.0 





 


BUN/Creatinine Ratio 41 (6-20) 


 


Glucose Level


 104 mg/dL


(70-99)


 


Calcium Level


 8.7 mg/dL


(8.5-10.1)


 


Total Bilirubin


 0.7 mg/dL


(0.2-1.0)


 


Aspartate Amino Transf


(AST/SGOT) 22 U/L (15-37) 





 


Alanine Aminotransferase


(ALT/SGPT) 101 U/L


(16-63)


 


Alkaline Phosphatase


 64 U/L


()


 


Total Protein


 5.8 g/dL


(6.4-8.2)


 


Albumin


 2.4 g/dL


(3.4-5.0)


 


Albumin/Globulin Ratio 0.7 (1.0-1.7) 











Review of Systems


Review of Systems:


Denies HA and dizziness


Denies SOB


Denies chest pain or palpitations


Denies N/V


Bowel and bladder continent





Assessment and Plan


Assessmemt and Plan


SEPSIS, was POA


Severe malnutrition was POA, with obesity, BMI 32


Acute hypoxic respiratory failure


COVID-19 pneumonia requiring 


transaminitis, 


 


Plan


COVID protocol


Completed IV Remdesivir


Continue IV thiamine and vitamin C


Symptomatic treatment of cough


Continue steroids with taper


Continue supplemental oxygen to keep sats above 92% currently on Vapotherm, 

currently on 90% and 40 liters will slowly wean as tolerated, slowly improving


Appreciate Pulmonology and Infectious Disease Input


DVT prophylaxis 


Trend labs


PT/OT


Full code


Cardiac monitoring


Social work for DC planning





Comment


Review of Relevant


I have reviewed the following items genesis (where applicable) has been applied.


Medications:





Current Medications








 Medications


  (Trade)  Dose


 Ordered  Sig/Shayy


 Route


 PRN Reason  Start Time


 Stop Time Status Last Admin


Dose Admin


 


 Methylprednisolone


 Sodium Succinate


  (SOLU-Medrol


 125MG VIAL)  60 mg  DAILY


 IV


   8/12/21 09:00


    8/12/21 08:55














Justifications for Admission


Other Justification














SHIRIN SCHNEIDER III DO           Aug 12, 2021 11:13

## 2021-08-13 VITALS — DIASTOLIC BLOOD PRESSURE: 88 MMHG | SYSTOLIC BLOOD PRESSURE: 168 MMHG

## 2021-08-13 VITALS — SYSTOLIC BLOOD PRESSURE: 130 MMHG | DIASTOLIC BLOOD PRESSURE: 73 MMHG

## 2021-08-13 VITALS — DIASTOLIC BLOOD PRESSURE: 85 MMHG | SYSTOLIC BLOOD PRESSURE: 127 MMHG

## 2021-08-13 VITALS — SYSTOLIC BLOOD PRESSURE: 123 MMHG | DIASTOLIC BLOOD PRESSURE: 80 MMHG

## 2021-08-13 VITALS — SYSTOLIC BLOOD PRESSURE: 126 MMHG | DIASTOLIC BLOOD PRESSURE: 74 MMHG

## 2021-08-13 VITALS — DIASTOLIC BLOOD PRESSURE: 87 MMHG | SYSTOLIC BLOOD PRESSURE: 138 MMHG

## 2021-08-13 LAB
ALBUMIN SERPL-MCNC: 2.1 G/DL (ref 3.4–5)
ALBUMIN/GLOB SERPL: 0.6 {RATIO} (ref 1–1.7)
ALP SERPL-CCNC: 62 U/L (ref 46–116)
ALT SERPL-CCNC: 100 U/L (ref 16–63)
ANION GAP SERPL CALC-SCNC: 4 MMOL/L (ref 6–14)
AST SERPL-CCNC: 21 U/L (ref 15–37)
BASOPHILS # BLD AUTO: 0 X10^3/UL (ref 0–0.2)
BASOPHILS NFR BLD: 0 % (ref 0–3)
BILIRUB SERPL-MCNC: 0.5 MG/DL (ref 0.2–1)
BUN SERPL-MCNC: 23 MG/DL (ref 8–26)
BUN/CREAT SERPL: 33 (ref 6–20)
CALCIUM SERPL-MCNC: 8.1 MG/DL (ref 8.5–10.1)
CHLORIDE SERPL-SCNC: 102 MMOL/L (ref 98–107)
CO2 SERPL-SCNC: 33 MMOL/L (ref 21–32)
CREAT SERPL-MCNC: 0.7 MG/DL (ref 0.7–1.3)
EOSINOPHIL NFR BLD: 0 % (ref 0–3)
EOSINOPHIL NFR BLD: 0 X10^3/UL (ref 0–0.7)
ERYTHROCYTE [DISTWIDTH] IN BLOOD BY AUTOMATED COUNT: 13.3 % (ref 11.5–14.5)
GFR SERPLBLD BASED ON 1.73 SQ M-ARVRAT: 116.7 ML/MIN
GLUCOSE SERPL-MCNC: 183 MG/DL (ref 70–99)
HCT VFR BLD CALC: 43.3 % (ref 39–53)
HGB BLD-MCNC: 14.7 G/DL (ref 13–17.5)
LYMPHOCYTES # BLD: 0.3 X10^3/UL (ref 1–4.8)
LYMPHOCYTES NFR BLD AUTO: 2 % (ref 24–48)
MCH RBC QN AUTO: 32 PG (ref 25–35)
MCHC RBC AUTO-ENTMCNC: 34 G/DL (ref 31–37)
MCV RBC AUTO: 93 FL (ref 79–100)
MONO #: 0.2 X10^3/UL (ref 0–1.1)
MONOCYTES NFR BLD: 1 % (ref 0–9)
NEUT #: 14.8 X10^3/UL (ref 1.8–7.7)
NEUTROPHILS NFR BLD AUTO: 97 % (ref 31–73)
PLATELET # BLD AUTO: 100 X10^3/UL (ref 140–400)
POTASSIUM SERPL-SCNC: 4.5 MMOL/L (ref 3.5–5.1)
PROT SERPL-MCNC: 5.6 G/DL (ref 6.4–8.2)
RBC # BLD AUTO: 4.64 X10^6/UL (ref 4.3–5.7)
SODIUM SERPL-SCNC: 139 MMOL/L (ref 136–145)
WBC # BLD AUTO: 15.2 X10^3/UL (ref 4–11)

## 2021-08-13 RX ADMIN — IPRATROPIUM BROMIDE AND ALBUTEROL SCH PUFF: 20; 100 SPRAY, METERED RESPIRATORY (INHALATION) at 08:59

## 2021-08-13 RX ADMIN — ACETAMINOPHEN PRN MG: 325 TABLET, FILM COATED ORAL at 20:56

## 2021-08-13 RX ADMIN — WATER PRN ML: 1 INJECTION INTRAVENOUS at 14:00

## 2021-08-13 RX ADMIN — ENOXAPARIN SODIUM SCH MG: 40 INJECTION SUBCUTANEOUS at 20:56

## 2021-08-13 RX ADMIN — IPRATROPIUM BROMIDE AND ALBUTEROL SCH PUFF: 20; 100 SPRAY, METERED RESPIRATORY (INHALATION) at 12:06

## 2021-08-13 RX ADMIN — ZINC SULFATE CAP 220 MG (50 MG ELEMENTAL ZN) SCH MG: 220 (50 ZN) CAP at 08:59

## 2021-08-13 RX ADMIN — Medication SCH MG: at 08:59

## 2021-08-13 RX ADMIN — CETIRIZINE HYDROCHLORIDE SCH MG: 10 TABLET, FILM COATED ORAL at 08:59

## 2021-08-13 RX ADMIN — IPRATROPIUM BROMIDE AND ALBUTEROL SCH PUFF: 20; 100 SPRAY, METERED RESPIRATORY (INHALATION) at 20:55

## 2021-08-13 RX ADMIN — ACETAMINOPHEN PRN MG: 325 TABLET, FILM COATED ORAL at 08:59

## 2021-08-13 RX ADMIN — CHOLECALCIFEROL CAP 125 MCG (5000 UNIT) SCH UNIT: 125 CAP at 08:59

## 2021-08-13 RX ADMIN — WATER PRN ML: 1 INJECTION INTRAVENOUS at 02:55

## 2021-08-13 RX ADMIN — FAMOTIDINE SCH MG: 20 TABLET ORAL at 20:56

## 2021-08-13 RX ADMIN — IPRATROPIUM BROMIDE AND ALBUTEROL SCH PUFF: 20; 100 SPRAY, METERED RESPIRATORY (INHALATION) at 16:00

## 2021-08-13 NOTE — PDOC
[employer] PROGRESS NOTE


Date of Service


DOS:


DATE: 8/13/21 


TIME: 11:13





Chief Complaint


Chief Complaint


SEPSIS, was POA


Severe malnutrition was POA, with obesity, BMI 32


Acute hypoxic respiratory failure


COVID-19 pneumonia r


transaminitis





History of Present Illness


History of Present Illness


Mr Read is a 56-year-old male with no significant past medical history except 

for degenerative joint disease who was transferred from [facility]  

with complaints of fever, headache, dry cough, shortness of breath for the past 

3 days, admitted on 7/26/2021.  Patient stated that he was actually exposed from

his son he was worsening and required to come to the ED at [facility] and he was

found to be hypoxic and required 2 L of nasal cannula oxygen.  Chest x-ray over 

there showed bilateral mild bibasilar infiltrates.  Covid test was positive and 

patient was transferred to [facility] for pulmonary evaluation and treatment.  Patient 

was started on Remdesivir and IV steroids.  Currently patient is requiring 15 L 

high flow nasal cannula and IV steroids.





7/30: No acute events overnight.  Patient was saturating 92% on 15 L nasal 

cannula and he had desatted down to 70% after he had to blow his nose.  Patient 

was placed on BiPAP at 100% FiO2.  Chest leg appears to be worsening.


7/31: Last dose of remdesivir. Could not tolerate vapotherm and is now on BIPAP.


8/1:  poor Po intake, dry,   dark urine


8/2: Desaturates to 72% when he takes off his BiPAP.  Advised to get back on 

BIPAP after eating. he is able to place himself back on. Frustrated with the 

severity of his hypoxia.


8/3: Status post remdesivir. Still requiring BiPAP 75% FiO2 with significant 

desaturations when he is not wearing assessment with nonrebreather O2.


8/4: Still with significant desaturations when he raises BiPAP even on 15 L high

flow nasal cannula.  Discussed transitioning back to Vapotherm if we can treat 

his anxiety.  He is amenable to this.  He is frustrated with staying in the 

hospital understands he may be a longer term COVID recovery and is amenable to 

LTACH referral.  I discussed with pulmonology.


8/5: Afebrile. 70% FiO2 on BiPAP 12/6.  Feels better.  Did not tolerated 

Vapotherm.  Significant desaturations when he transitions to high flow nasal 

cannula O2 while eating.  Discussed referral to LTACH with pulmonology and ID as

it appears he will need a longer COVID recovery.


8/6: Patient on 70% FiO2.  Via BiPAP 12/6 when seen by pulmonology. Feels better

and is seen on vapotherm 40l/min 100% FiO2 with saturations 89%, tolerating 

well.





8/8/2021


patient seen at edge of bed. FiO2 at 89%. Dropped to low 80s while talking to 

us. Educated patient on conserving energy and working to keep FiO2 higher.


D/W RN


Chart Reviewed





8/9/2021


Patient was seen and examined in bed. FiO2 of 87% while on Vapotherm with 40L of

90% O2.


D/W RN, Chart Review


Patient has not had a bowel movement in two days. 


Patient feels he is doing better but his FiO2 still drops with activity or 

speaking


Discussed need to rest and focus on breathing to maintain proper FiO2.





8/10/2021


Patient was resting in bed with NAD


Seen and examined in bed. FiO2 of 92% while on Vapotherm with 40L of 90% O2.


D/W RN


Chart Review


Discussed need to rest and focus on breathing to maintain proper FiO2





8/11/2021


Patient was awake, alert and NAD


Seen and examine in bed.


FiO2 92% while on Vapotherm with 40L of 90% O2


Discussed that we will continue to monitor for clinical improvement





8/12/2021


Patient resting upon entry into room. Upon exam in bed he was awake, alert and 

NAD


FiO2 90% while on Vapotherm with 40L of 90% O2


Discussed continued monitoring and that he is making slow improvements.


Patient was encouraged by improvements and eager to return to as high of 

function as possible.





8/13/2021


Patient awake, alert and on computer.


FiO2 92% while on Vapotherm with 40L of 90% O2


Continuing to make slow improvements


Discussed continuing treatment plan at this time





Vitals/I&O


Vitals/I&O:





                                   Vital Signs








  Date Time  Temp Pulse Resp B/P (MAP) Pulse Ox O2 Delivery O2 Flow Rate FiO2


 


8/13/21 08:17     88 VAPOTHERM 40.0 


 


8/13/21 06:40 97.5 60 26 130/73 (92)    





 97.5       














                                    I & O   


 


 8/12/21 8/12/21 8/13/21





 15:00 23:00 07:00


 


Intake Total 550 ml 500 ml 200 ml


 


Output Total  1800 ml 500 ml


 


Balance 550 ml -1300 ml -300 ml











Physical Exam


Physical Exam:








GEN axo3 male on BIPAP


HEENT:  Normocephalic, atraumatic.  Anicteric. 


NECK:  Supple, no JVD.


LUNGS:  Crackles bilaterally.   


HEART:  S1, S2.  


ABDOMEN:  Soft, nontender, nondistended.  Bowel sounds present.


EXTREMITIES:  No edema, no cyanosis.


DERMATOLOGIC:  Warm, dry, no generalized rash.


NEUROLOGIC:  Alert, oriented, grossly nonfocal.


PSYCHIATRIC:  Calm and cooperative. 


 PIV looks clean.


General:  Alert, mild distress


Heart:  Regular rate (tele reg,  reviewed)


Abdomen:  Normal bowel sounds


Extremities:  No clubbing


Skin:  No rashes





Labs


Labs:





Laboratory Tests








Test


 8/13/21


04:30


 


White Blood Count


 15.2 x10^3/uL


(4.0-11.0)


 


Red Blood Count


 4.64 x10^6/uL


(4.30-5.70)


 


Hemoglobin


 14.7 g/dL


(13.0-17.5)


 


Hematocrit


 43.3 %


(39.0-53.0)


 


Mean Corpuscular Volume 93 fL () 


 


Mean Corpuscular Hemoglobin 32 pg (25-35) 


 


Mean Corpuscular Hemoglobin


Concent 34 g/dL


(31-37)


 


Red Cell Distribution Width


 13.3 %


(11.5-14.5)


 


Platelet Count


 100 x10^3/uL


(140-400)


 


Neutrophils (%) (Auto) 97 % (31-73) 


 


Lymphocytes (%) (Auto) 2 % (24-48) 


 


Monocytes (%) (Auto) 1 % (0-9) 


 


Eosinophils (%) (Auto) 0 % (0-3) 


 


Basophils (%) (Auto) 0 % (0-3) 


 


Neutrophils # (Auto)


 14.8 x10^3/uL


(1.8-7.7)


 


Lymphocytes # (Auto)


 0.3 x10^3/uL


(1.0-4.8)


 


Monocytes # (Auto)


 0.2 x10^3/uL


(0.0-1.1)


 


Eosinophils # (Auto)


 0.0 x10^3/uL


(0.0-0.7)


 


Basophils # (Auto)


 0.0 x10^3/uL


(0.0-0.2)


 


Sodium Level


 139 mmol/L


(136-145)


 


Potassium Level


 4.5 mmol/L


(3.5-5.1)


 


Chloride Level


 102 mmol/L


()


 


Carbon Dioxide Level


 33 mmol/L


(21-32)


 


Anion Gap 4 (6-14) 


 


Blood Urea Nitrogen


 23 mg/dL


(8-26)


 


Creatinine


 0.7 mg/dL


(0.7-1.3)


 


Estimated GFR


(Cockcroft-Gault) 116.7 





 


BUN/Creatinine Ratio 33 (6-20) 


 


Glucose Level


 183 mg/dL


(70-99)


 


Calcium Level


 8.1 mg/dL


(8.5-10.1)


 


Total Bilirubin


 0.5 mg/dL


(0.2-1.0)


 


Aspartate Amino Transf


(AST/SGOT) 21 U/L (15-37) 





 


Alanine Aminotransferase


(ALT/SGPT) 100 U/L


(16-63)


 


Alkaline Phosphatase


 62 U/L


()


 


Total Protein


 5.6 g/dL


(6.4-8.2)


 


Albumin


 2.1 g/dL


(3.4-5.0)


 


Albumin/Globulin Ratio 0.6 (1.0-1.7) 











Review of Systems


Review of Systems:


Denies numbness or tingling in extremities


Denies fever or chills


Bowel and bladder continent





Assessment and Plan


Assessmemt and Plan


SEPSIS, was POA


Severe malnutrition was POA, with obesity, BMI 32


Acute hypoxic respiratory failure


COVID-19 pneumonia requiring 


transaminitis, 


 


Plan





Continue supplemental oxygen to keep sats above 92% currently on Vapotherm, 

currently on 90% and 40 liters will slowly wean as tolerated, slowly improving


Appreciate Pulmonology and Infectious Disease Input


COVID protocol


Completed IV Remdesivir


Completed steroid taper


Continue IV vitamins


PRN treatment of cough


DVT prophylaxis 


Trend labs


PT/OT


Full code


Cardiac monitoring


Social work for DC planning





Comment


Review of Relevant


I have reviewed the following items genesis (where applicable) has been applied.





Justifications for Admission


Other Justification














SHIRIN SCHNEIDER III DO           Aug 13, 2021 11:19

## 2021-08-14 VITALS — SYSTOLIC BLOOD PRESSURE: 128 MMHG | DIASTOLIC BLOOD PRESSURE: 78 MMHG

## 2021-08-14 VITALS — SYSTOLIC BLOOD PRESSURE: 125 MMHG | DIASTOLIC BLOOD PRESSURE: 75 MMHG

## 2021-08-14 VITALS — DIASTOLIC BLOOD PRESSURE: 68 MMHG | SYSTOLIC BLOOD PRESSURE: 124 MMHG

## 2021-08-14 VITALS — SYSTOLIC BLOOD PRESSURE: 147 MMHG | DIASTOLIC BLOOD PRESSURE: 73 MMHG

## 2021-08-14 VITALS — DIASTOLIC BLOOD PRESSURE: 95 MMHG | SYSTOLIC BLOOD PRESSURE: 144 MMHG

## 2021-08-14 VITALS — SYSTOLIC BLOOD PRESSURE: 116 MMHG | DIASTOLIC BLOOD PRESSURE: 71 MMHG

## 2021-08-14 RX ADMIN — ENOXAPARIN SODIUM SCH MG: 40 INJECTION SUBCUTANEOUS at 23:08

## 2021-08-14 RX ADMIN — WATER PRN ML: 1 INJECTION INTRAVENOUS at 10:24

## 2021-08-14 RX ADMIN — FAMOTIDINE SCH MG: 20 TABLET ORAL at 23:08

## 2021-08-14 RX ADMIN — ZINC SULFATE CAP 220 MG (50 MG ELEMENTAL ZN) SCH MG: 220 (50 ZN) CAP at 10:21

## 2021-08-14 RX ADMIN — METHYLPREDNISOLONE SODIUM SUCCINATE SCH MG: 125 INJECTION, POWDER, FOR SOLUTION INTRAMUSCULAR; INTRAVENOUS at 10:21

## 2021-08-14 RX ADMIN — IPRATROPIUM BROMIDE AND ALBUTEROL SCH PUFF: 20; 100 SPRAY, METERED RESPIRATORY (INHALATION) at 10:20

## 2021-08-14 RX ADMIN — WATER PRN ML: 1 INJECTION INTRAVENOUS at 01:47

## 2021-08-14 RX ADMIN — CHOLECALCIFEROL CAP 125 MCG (5000 UNIT) SCH UNIT: 125 CAP at 10:21

## 2021-08-14 RX ADMIN — Medication SCH MG: at 10:21

## 2021-08-14 RX ADMIN — CETIRIZINE HYDROCHLORIDE SCH MG: 10 TABLET, FILM COATED ORAL at 10:21

## 2021-08-14 RX ADMIN — IPRATROPIUM BROMIDE AND ALBUTEROL SCH PUFF: 20; 100 SPRAY, METERED RESPIRATORY (INHALATION) at 23:08

## 2021-08-14 RX ADMIN — IPRATROPIUM BROMIDE AND ALBUTEROL SCH PUFF: 20; 100 SPRAY, METERED RESPIRATORY (INHALATION) at 12:35

## 2021-08-14 RX ADMIN — ACETAMINOPHEN PRN MG: 325 TABLET, FILM COATED ORAL at 23:09

## 2021-08-14 RX ADMIN — IPRATROPIUM BROMIDE AND ALBUTEROL SCH PUFF: 20; 100 SPRAY, METERED RESPIRATORY (INHALATION) at 15:57

## 2021-08-14 NOTE — PDOC
[employer] PROGRESS NOTE


Date of Service


DOS:


DATE: 8/14/21 


TIME: 11:17





Chief Complaint


Chief Complaint


SEPSIS, was POA


Severe malnutrition was POA, with obesity, BMI 32


Acute hypoxic respiratory failure


COVID-19 pneumonia r


transaminitis





History of Present Illness


History of Present Illness


Mr Read is a 56-year-old male with no significant past medical history except 

for degenerative joint disease who was transferred from [facility]  

with complaints of fever, headache, dry cough, shortness of breath for the past 

3 days, admitted on 7/26/2021.  Patient stated that he was actually exposed from

his son he was worsening and required to come to the ED at [facility] and he was

found to be hypoxic and required 2 L of nasal cannula oxygen.  Chest x-ray over 

there showed bilateral mild bibasilar infiltrates.  Covid test was positive and 

patient was transferred to [facility] for pulmonary evaluation and treatment.  Patient 

was started on Remdesivir and IV steroids.  Currently patient is requiring 15 L 

high flow nasal cannula and IV steroids.





7/30: No acute events overnight.  Patient was saturating 92% on 15 L nasal 

cannula and he had desatted down to 70% after he had to blow his nose.  Patient 

was placed on BiPAP at 100% FiO2.  Chest leg appears to be worsening.


7/31: Last dose of remdesivir. Could not tolerate vapotherm and is now on BIPAP.


8/1:  poor Po intake, dry,   dark urine


8/2: Desaturates to 72% when he takes off his BiPAP.  Advised to get back on 

BIPAP after eating. he is able to place himself back on. Frustrated with the 

severity of his hypoxia.


8/3: Status post remdesivir. Still requiring BiPAP 75% FiO2 with significant 

desaturations when he is not wearing assessment with nonrebreather O2.


8/4: Still with significant desaturations when he raises BiPAP even on 15 L high

flow nasal cannula.  Discussed transitioning back to Vapotherm if we can treat 

his anxiety.  He is amenable to this.  He is frustrated with staying in the 

hospital understands he may be a longer term COVID recovery and is amenable to 

LTACH referral.  I discussed with pulmonology.


8/5: Afebrile. 70% FiO2 on BiPAP 12/6.  Feels better.  Did not tolerated 

Vapotherm.  Significant desaturations when he transitions to high flow nasal 

cannula O2 while eating.  Discussed referral to LTACH with pulmonology and ID as

it appears he will need a longer COVID recovery.


8/6: Patient on 70% FiO2.  Via BiPAP 12/6 when seen by pulmonology. Feels better

and is seen on vapotherm 40l/min 100% FiO2 with saturations 89%, tolerating 

well.





8/8/2021


patient seen at edge of bed. FiO2 at 89%. Dropped to low 80s while talking to 

us. Educated patient on conserving energy and working to keep FiO2 higher.


D/W RN


Chart Reviewed





8/9/2021


Patient was seen and examined in bed. FiO2 of 87% while on Vapotherm with 40L of

90% O2.


D/W RN, Chart Review


Patient has not had a bowel movement in two days. 


Patient feels he is doing better but his FiO2 still drops with activity or 

speaking


Discussed need to rest and focus on breathing to maintain proper FiO2.





8/10/2021


Patient was resting in bed with NAD


Seen and examined in bed. FiO2 of 92% while on Vapotherm with 40L of 90% O2.


D/W RN


Chart Review


Discussed need to rest and focus on breathing to maintain proper FiO2





8/11/2021


Patient was awake, alert and NAD


Seen and examine in bed.


FiO2 92% while on Vapotherm with 40L of 90% O2


Discussed that we will continue to monitor for clinical improvement





8/12/2021


Patient resting upon entry into room. Upon exam in bed he was awake, alert and 

NAD


FiO2 90% while on Vapotherm with 40L of 90% O2


Discussed continued monitoring and that he is making slow improvements.


Patient was encouraged by improvements and eager to return to as high of 

function as possible.





8/13/2021


Patient awake, alert and on computer.


FiO2 92% while on Vapotherm with 40L of 90% O2


Continuing to make slow improvements


Discussed continuing treatment plan at this time





8/14/2021


Patient resting upon entry into room. Upon exam in bed he was awake, alert and 

NAD


FiO2 90% while on Vapotherm with 40L of 85% O2


We will continue to monitor as he is making slow improvements


Discharge plan will be created as he continues with current treatment





Vitals/I&O


Vitals/I&O:





                                   Vital Signs








  Date Time  Temp Pulse Resp B/P (MAP) Pulse Ox O2 Delivery O2 Flow Rate FiO2


 


8/14/21 09:05     94 VAPOTHERM 40.0 


 


8/14/21 06:56 98.2 92 23 128/78 (95)    





 98.2       














                                    I & O   


 


 8/13/21 8/13/21 8/14/21





 15:00 23:00 07:00


 


Intake Total 440 ml 560 ml 200 ml


 


Output Total  1200 ml 500 ml


 


Balance 440 ml -640 ml -300 ml











Physical Exam


Physical Exam:








GEN axo3 male on BIPAP


HEENT:  Normocephalic, atraumatic.  Anicteric. 


NECK:  Supple, no JVD.


LUNGS:  Crackles bilaterally.   


HEART:  S1, S2.  


ABDOMEN:  Soft, nontender, nondistended.  Bowel sounds present.


EXTREMITIES:  No edema, no cyanosis.


DERMATOLOGIC:  Warm, dry, no generalized rash.


NEUROLOGIC:  Alert, oriented, grossly nonfocal.


PSYCHIATRIC:  Calm and cooperative. 


 PIV looks clean.


General:  Alert, mild distress


Heart:  Regular rate (tele reg,  reviewed)


Abdomen:  Normal bowel sounds


Extremities:  No clubbing


Skin:  No rashes





Review of Systems


Review of Systems:


Denies HA or dizziness


Denies fevers or chills


Bowel and bladder continent


Denies vision changes





Assessment and Plan


Assessmemt and Plan


SEPSIS, was POA


Severe malnutrition was POA, with obesity, BMI 32


Acute hypoxic respiratory failure


COVID-19 pneumonia requiring 


transaminitis, 


 


Plan


Continue supplemental oxygen on Vapotherm, currently on 85% and 40 liters will 

slowly wean as tolerated, slowly improving


Appreciate Pulmonology and Infectious Disease Input


DVT prophylaxis 


COVID protocol


Completed IV Remdesivir


Completed steroid taper


Continue IV vitamins


PRN treatment of cough


Trend labs


PT/OT


Full code


Cardiac monitoring


Prognosis guarded


CC time 31 minutes


Social work for DC planning once oxygen requirement improves or if insurance 

approves for LTAC





Comment


Review of Relevant


I have reviewed the following items genesis (where applicable) has been applied.





Justifications for Admission


Other Justification














SHIRIN SCHNEIDER III DO           Aug 14, 2021 11:22

## 2021-08-14 NOTE — PDOC
PULMONARY PROGRESS NOTES


DATE: 8/14/21 


TIME: 09:27


Subjective


Patient remains on Vapotherm 40 L, 85%


Reports episodes of shortness of breath with ambulation overnight, and fatigue 

this morning


Vitals





Vital Signs








  Date Time  Temp Pulse Resp B/P (MAP) Pulse Ox O2 Delivery O2 Flow Rate FiO2


 


8/14/21 09:05     94 VAPOTHERM 40.0 


 


8/14/21 06:56 98.2 92 23 128/78 (95)    





 98.2       








Comments


Pt. seen during covid -19 pandemic visual exam preformed 


no distress 


Vapotherm 90%


no obvious rash or edema


Labs





Laboratory Tests








Test


 8/13/21


04:30


 


White Blood Count


 15.2 x10^3/uL


(4.0-11.0)


 


Red Blood Count


 4.64 x10^6/uL


(4.30-5.70)


 


Hemoglobin


 14.7 g/dL


(13.0-17.5)


 


Hematocrit


 43.3 %


(39.0-53.0)


 


Mean Corpuscular Volume 93 fL () 


 


Mean Corpuscular Hemoglobin 32 pg (25-35) 


 


Mean Corpuscular Hemoglobin


Concent 34 g/dL


(31-37)


 


Red Cell Distribution Width


 13.3 %


(11.5-14.5)


 


Platelet Count


 100 x10^3/uL


(140-400)


 


Neutrophils (%) (Auto) 97 % (31-73) 


 


Lymphocytes (%) (Auto) 2 % (24-48) 


 


Monocytes (%) (Auto) 1 % (0-9) 


 


Eosinophils (%) (Auto) 0 % (0-3) 


 


Basophils (%) (Auto) 0 % (0-3) 


 


Neutrophils # (Auto)


 14.8 x10^3/uL


(1.8-7.7)


 


Lymphocytes # (Auto)


 0.3 x10^3/uL


(1.0-4.8)


 


Monocytes # (Auto)


 0.2 x10^3/uL


(0.0-1.1)


 


Eosinophils # (Auto)


 0.0 x10^3/uL


(0.0-0.7)


 


Basophils # (Auto)


 0.0 x10^3/uL


(0.0-0.2)


 


Sodium Level


 139 mmol/L


(136-145)


 


Potassium Level


 4.5 mmol/L


(3.5-5.1)


 


Chloride Level


 102 mmol/L


()


 


Carbon Dioxide Level


 33 mmol/L


(21-32)


 


Anion Gap 4 (6-14) 


 


Blood Urea Nitrogen


 23 mg/dL


(8-26)


 


Creatinine


 0.7 mg/dL


(0.7-1.3)


 


Estimated GFR


(Cockcroft-Gault) 116.7 





 


BUN/Creatinine Ratio 33 (6-20) 


 


Glucose Level


 183 mg/dL


(70-99)


 


Calcium Level


 8.1 mg/dL


(8.5-10.1)


 


Total Bilirubin


 0.5 mg/dL


(0.2-1.0)


 


Aspartate Amino Transf


(AST/SGOT) 21 U/L (15-37) 





 


Alanine Aminotransferase


(ALT/SGPT) 100 U/L


(16-63)


 


Alkaline Phosphatase


 62 U/L


()


 


Total Protein


 5.6 g/dL


(6.4-8.2)


 


Albumin


 2.1 g/dL


(3.4-5.0)


 


Albumin/Globulin Ratio 0.6 (1.0-1.7) 








Medications





Active Scripts








 Medications  Dose


 Route/Sig


 Max Daily Dose Days Date Category Dose


Instructions


 


 Morphine Sulfate


 2 Mg/1 Ml


 Cartridge  2 Mg


 IV PRN Q4HRS PRN


   7/27/21 Reported 


 


 Zinc Sulfate 50


 Mg Tablet  220 Mg


 PO DAILY


   7/27/21 Reported 


 


 Methylprednisolone


 Sod Succ 125 Mg


 Vial  60 Mg


 IJ Q8HRS


   7/27/21 Reported 


 


 Combivent


 Respimat Inhal


  (Ipratropium/Albuterol


 Sulfate) 4 Gm


 Aer.w.adap  2 Inh


 IH QID


   7/27/21 Reported 


 


 Pepcid


  (Famotidine) 20


 Mg Tablet  20 Mg


 PO HS


   7/27/21 Reported 


 


 Lovenox


  (Enoxaparin


 Sodium) 30 Mg/0.3


 Ml Disp.syrin  30 Mg


 SQ DAILY


   7/27/21 Reported 


 


 Vitamin D3 **


  (Vitamin D) 125


 Mcg Capsule  125 Mcg


 PO DAILY


   7/27/21 Reported  5,000 UNITS = 125 MCG


 


 Zyrtec


  (Cetirizine Hcl)


 10 Mg Tablet  1 Tab


 PO DAILY


   7/27/21 Reported 


 


 Ascorbic Acid 500


 Mg Tablet  2 Mg


 PO DAILY


   7/27/21 Reported 


 


 Acetaminophen 325


 Mg Tablet  2 Tab


 PO PRN Q6HRS PRN


  30 7/27/21 Reported 


 


 No Known


 Medications Prior


 To Admisstion


  (Info)  Each  1 Each


 


   8/25/20 Reported 











Impression


.


IMPRESSION:


1.  Acute hypoxemic respiratory failure secondary to COVID-19 viral 

pneumonia./Acute lung injury and early ARDS.  Slowly improving


2.  COVID-19 viral pneumonia.


3.  Abnormal chest x-ray, compatible with viral pneumonia.


4.  Nonspecific anxiety, possible clinical depression





Plan


.


Updated 8/14/21


Continue supplemental oxygen to keep sats above 92% currently on Vapotherm, 

currently on 85% and 40 liters will slowly wean as tolerated, slowly improving


Symptomatic treatment of cough


Continue steroids with taper


PT/OT


DVT/GI PPX :lovenox 


D/W RN and RT


Social work for DC planning once oxygen requirement improves or if insurance 

approves for LTAC








Updated 8/13/21


Continue supplemental oxygen to keep sats above 92% currently on Vapotherm, 

currently on 90% and 40 liters will slowly wean as tolerated, slowly improving


Symptomatic treatment of cough


Continue steroids with taper


PT/OT


DVT/GI PPX :lovenox 


D/W RN and RT


Social work for DC planning 








Updated 8/12/21


Continue supplemental oxygen to keep sats above 92% currently on Vapotherm, 

currently on 90% and 40 liters will slowly wean as tolerated, slowly improving


Symptomatic treatment of cough


Continue steroids with taper


PT/OT


DVT/GI PPX :lovenox 


D/W RN and RT


Social work for DC planning 





Updated 8/11/21


Continue supplemental oxygen to keep sats above 92% currently on Vapotherm, cu

rrently on 90% and 40 liters will slowly wean as tolerated 


S/P remdesivir


Continue steroids with taper, now on daily dosing 


PT/OT


DVT/GI PPX :lovenox 


D/W RN and RT


Social work for DC planning 





Updated 8/10/21


Continue supplemental oxygen to keep sats above 92% currently on Vapotherm and 

tolerating well.  90% FiO2 and 40 L flow.


S/P remdesivir


Continue steroids with taper


Off antibiotics.


PT/OT


DVT/GI PPX :lovenox 


D/W RN and RT


remains critical but stable and slowly improving











ASHLEY YEAGER MD                 Aug 14, 2021 09:28

## 2021-08-15 VITALS — DIASTOLIC BLOOD PRESSURE: 90 MMHG | SYSTOLIC BLOOD PRESSURE: 154 MMHG

## 2021-08-15 VITALS — SYSTOLIC BLOOD PRESSURE: 103 MMHG | DIASTOLIC BLOOD PRESSURE: 54 MMHG

## 2021-08-15 VITALS — SYSTOLIC BLOOD PRESSURE: 120 MMHG | DIASTOLIC BLOOD PRESSURE: 76 MMHG

## 2021-08-15 VITALS — DIASTOLIC BLOOD PRESSURE: 64 MMHG | SYSTOLIC BLOOD PRESSURE: 109 MMHG

## 2021-08-15 VITALS — SYSTOLIC BLOOD PRESSURE: 182 MMHG | DIASTOLIC BLOOD PRESSURE: 73 MMHG

## 2021-08-15 RX ADMIN — ACETAMINOPHEN PRN MG: 325 TABLET, FILM COATED ORAL at 21:38

## 2021-08-15 RX ADMIN — IPRATROPIUM BROMIDE AND ALBUTEROL SCH PUFF: 20; 100 SPRAY, METERED RESPIRATORY (INHALATION) at 08:00

## 2021-08-15 RX ADMIN — IPRATROPIUM BROMIDE AND ALBUTEROL SCH PUFF: 20; 100 SPRAY, METERED RESPIRATORY (INHALATION) at 20:00

## 2021-08-15 RX ADMIN — CETIRIZINE HYDROCHLORIDE SCH MG: 10 TABLET, FILM COATED ORAL at 09:32

## 2021-08-15 RX ADMIN — CHOLECALCIFEROL CAP 125 MCG (5000 UNIT) SCH UNIT: 125 CAP at 09:32

## 2021-08-15 RX ADMIN — ENOXAPARIN SODIUM SCH MG: 40 INJECTION SUBCUTANEOUS at 21:25

## 2021-08-15 RX ADMIN — FAMOTIDINE SCH MG: 20 TABLET ORAL at 21:25

## 2021-08-15 RX ADMIN — METHYLPREDNISOLONE SODIUM SUCCINATE SCH MG: 125 INJECTION, POWDER, FOR SOLUTION INTRAMUSCULAR; INTRAVENOUS at 09:32

## 2021-08-15 RX ADMIN — Medication SCH MG: at 09:32

## 2021-08-15 RX ADMIN — WATER PRN ML: 1 INJECTION INTRAVENOUS at 14:56

## 2021-08-15 RX ADMIN — WATER PRN ML: 1 INJECTION INTRAVENOUS at 03:19

## 2021-08-15 RX ADMIN — IPRATROPIUM BROMIDE AND ALBUTEROL SCH PUFF: 20; 100 SPRAY, METERED RESPIRATORY (INHALATION) at 16:00

## 2021-08-15 RX ADMIN — IPRATROPIUM BROMIDE AND ALBUTEROL SCH PUFF: 20; 100 SPRAY, METERED RESPIRATORY (INHALATION) at 12:00

## 2021-08-15 RX ADMIN — ZINC SULFATE CAP 220 MG (50 MG ELEMENTAL ZN) SCH MG: 220 (50 ZN) CAP at 09:31

## 2021-08-15 NOTE — PDOC
PULMONARY PROGRESS NOTES


DATE: 8/15/21 


TIME: 09:50


Subjective


Patient remains on Vapotherm 40 L, 85%


Reports episodes of shortness of breath with ambulation overnight, and fatigue 

this morning


Vitals





Vital Signs








  Date Time  Temp Pulse Resp B/P (MAP) Pulse Ox O2 Delivery O2 Flow Rate FiO2


 


8/15/21 07:39 98.7 77 21 120/76 (91) 94 vapotherm  





 98.7       


 


8/15/21 05:00       40.0 








Comments


Pt. seen during covid -19 pandemic visual exam preformed 


no distress 


Vapotherm 90%


no obvious rash or edema


Medications





Active Scripts








 Medications  Dose


 Route/Sig


 Max Daily Dose Days Date Category Dose


Instructions


 


 Morphine Sulfate


 2 Mg/1 Ml


 Cartridge  2 Mg


 IV PRN Q4HRS PRN


   7/27/21 Reported 


 


 Zinc Sulfate 50


 Mg Tablet  220 Mg


 PO DAILY


   7/27/21 Reported 


 


 Methylprednisolone


 Sod Succ 125 Mg


 Vial  60 Mg


 IJ Q8HRS


   7/27/21 Reported 


 


 Combivent


 Respimat Inhal


  (Ipratropium/Albuterol


 Sulfate) 4 Gm


 Aer.w.adap  2 Inh


 IH QID


   7/27/21 Reported 


 


 Pepcid


  (Famotidine) 20


 Mg Tablet  20 Mg


 PO HS


   7/27/21 Reported 


 


 Lovenox


  (Enoxaparin


 Sodium) 30 Mg/0.3


 Ml Disp.syrin  30 Mg


 SQ DAILY


   7/27/21 Reported 


 


 Vitamin D3 **


  (Vitamin D) 125


 Mcg Capsule  125 Mcg


 PO DAILY


   7/27/21 Reported  5,000 UNITS = 125 MCG


 


 Zyrtec


  (Cetirizine Hcl)


 10 Mg Tablet  1 Tab


 PO DAILY


   7/27/21 Reported 


 


 Ascorbic Acid 500


 Mg Tablet  2 Mg


 PO DAILY


   7/27/21 Reported 


 


 Acetaminophen 325


 Mg Tablet  2 Tab


 PO PRN Q6HRS PRN


  30 7/27/21 Reported 


 


 No Known


 Medications Prior


 To Admisstion


  (Info)  Each  1 Each


 


   8/25/20 Reported 











Impression


.


IMPRESSION:


1.  Acute hypoxemic respiratory failure secondary to COVID-19 viral 

pneumonia./Acute lung injury and early ARDS.  Slowly improving


2.  COVID-19 viral pneumonia.


3.  Abnormal chest x-ray, compatible with viral pneumonia.


4.  Nonspecific anxiety, possible clinical depression





Plan


.


Updated 8/15/21


Continue supplemental oxygen to keep sats above 92% currently on Vapotherm, 

currently on 85% and 40 liters will slowly wean as tolerated, slowly improving


Symptomatic treatment of cough


Continue steroids with taper


PT/OT


DVT/GI PPX :lovenox 


D/W RN and RT


Social work for DC planning once oxygen requirement improves or if insurance 

approves for LTAC











Updated 8/14/21


Continue supplemental oxygen to keep sats above 92% currently on Vapotherm, 

currently on 85% and 40 liters will slowly wean as tolerated, slowly improving


Symptomatic treatment of cough


Continue steroids with taper


PT/OT


DVT/GI PPX :lovenox 


D/W RN and RT


Social work for DC planning once oxygen requirement improves or if insurance 

approves for LTAC








Updated 8/13/21


Continue supplemental oxygen to keep sats above 92% currently on Vapotherm, 

currently on 90% and 40 liters will slowly wean as tolerated, slowly improving


Symptomatic treatment of cough


Continue steroids with taper


PT/OT


DVT/GI PPX :lovenox 


D/W RN and RT


Social work for DC planning 








Updated 8/12/21


Continue supplemental oxygen to keep sats above 92% currently on Vapotherm, 

currently on 90% and 40 liters will slowly wean as tolerated, slowly improving


Symptomatic treatment of cough


Continue steroids with taper


PT/OT


DVT/GI PPX :lovenox 


D/W RN and RT


Social work for DC planning 





Updated 8/11/21


Continue supplemental oxygen to keep sats above 92% currently on Vapotherm, 

currently on 90% and 40 liters will slowly wean as tolerated 


S/P remdesivir


Continue steroids with taper, now on daily dosing 


PT/OT


DVT/GI PPX :lovenox 


D/W RN and RT


Social work for DC planning 





Updated 8/10/21


Continue supplemental oxygen to keep sats above 92% currently on Vapotherm and 

tolerating well.  90% FiO2 and 40 L flow.


S/P remdesivir


Continue steroids with taper


Off antibiotics.


PT/OT


DVT/GI PPX :lovenox 


D/W RN and RT


remains critical but stable and slowly improving











ASHLEY YEAGER MD                 Aug 15, 2021 09:50

## 2021-08-15 NOTE — PDOC
[employer] PROGRESS NOTE


Date of Service


DOS:


DATE: 8/15/21 


TIME: 13:07





Chief Complaint


Chief Complaint


SEPSIS, was POA


Severe malnutrition was POA, with obesity, BMI 32


Acute hypoxic respiratory failure


COVID-19 pneumonia r


transaminitis





History of Present Illness


History of Present Illness


Mr Read is a 56-year-old male with no significant past medical history except 

for degenerative joint disease who was transferred from [facility]  

with complaints of fever, headache, dry cough, shortness of breath for the past 

3 days, admitted on 7/26/2021.  Patient stated that he was actually exposed from

his son he was worsening and required to come to the ED at [facility] and he was

found to be hypoxic and required 2 L of nasal cannula oxygen.  Chest x-ray over 

there showed bilateral mild bibasilar infiltrates.  Covid test was positive and 

patient was transferred to [facility] for pulmonary evaluation and treatment.  Patient 

was started on Remdesivir and IV steroids.  Currently patient is requiring 15 L 

high flow nasal cannula and IV steroids.





7/30: No acute events overnight.  Patient was saturating 92% on 15 L nasal 

cannula and he had desatted down to 70% after he had to blow his nose.  Patient 

was placed on BiPAP at 100% FiO2.  Chest leg appears to be worsening.


7/31: Last dose of remdesivir. Could not tolerate vapotherm and is now on BIPAP.


8/1:  poor Po intake, dry,   dark urine


8/2: Desaturates to 72% when he takes off his BiPAP.  Advised to get back on 

BIPAP after eating. he is able to place himself back on. Frustrated with the 

severity of his hypoxia.


8/3: Status post remdesivir. Still requiring BiPAP 75% FiO2 with significant 

desaturations when he is not wearing assessment with nonrebreather O2.


8/4: Still with significant desaturations when he raises BiPAP even on 15 L high

flow nasal cannula.  Discussed transitioning back to Vapotherm if we can treat 

his anxiety.  He is amenable to this.  He is frustrated with staying in the 

hospital understands he may be a longer term COVID recovery and is amenable to 

LTACH referral.  I discussed with pulmonology.


8/5: Afebrile. 70% FiO2 on BiPAP 12/6.  Feels better.  Did not tolerated 

Vapotherm.  Significant desaturations when he transitions to high flow nasal 

cannula O2 while eating.  Discussed referral to LTACH with pulmonology and ID as

it appears he will need a longer COVID recovery.


8/6: Patient on 70% FiO2.  Via BiPAP 12/6 when seen by pulmonology. Feels better

and is seen on vapotherm 40l/min 100% FiO2 with saturations 89%, tolerating 

well.





8/8/2021


patient seen at edge of bed. FiO2 at 89%. Dropped to low 80s while talking to 

us. Educated patient on conserving energy and working to keep FiO2 higher.


D/W RN


Chart Reviewed





8/9/2021


Patient was seen and examined in bed. FiO2 of 87% while on Vapotherm with 40L of

90% O2.


D/W RN, Chart Review


Patient has not had a bowel movement in two days. 


Patient feels he is doing better but his FiO2 still drops with activity or 

speaking


Discussed need to rest and focus on breathing to maintain proper FiO2.





8/10/2021


Patient was resting in bed with NAD


Seen and examined in bed. FiO2 of 92% while on Vapotherm with 40L of 90% O2.


D/W RN


Chart Review


Discussed need to rest and focus on breathing to maintain proper FiO2





8/11/2021


Patient was awake, alert and NAD


Seen and examine in bed.


FiO2 92% while on Vapotherm with 40L of 90% O2


Discussed that we will continue to monitor for clinical improvement





8/12/2021


Patient resting upon entry into room. Upon exam in bed he was awake, alert and 

NAD


FiO2 90% while on Vapotherm with 40L of 90% O2


Discussed continued monitoring and that he is making slow improvements.


Patient was encouraged by improvements and eager to return to as high of 

function as possible.





8/13/2021


Patient awake, alert and on computer.


FiO2 92% while on Vapotherm with 40L of 90% O2


Continuing to make slow improvements


Discussed continuing treatment plan at this time





8/14/2021


Patient resting upon entry into room. Upon exam in bed he was awake, alert and 

NAD


FiO2 90% while on Vapotherm with 40L of 85% O2


We will continue to monitor as he is making slow improvements


Discharge plan will be created as he continues with current treatment





8/15/2021


Patient in prone position upon entry into room. States that he placed himself in

this position after he had read that it would help online. 


Upon exam in bed he was awake, alert and NAD


FiO2 90% while on Vapotherm with 40L of 85% O2


D/W RN, Chart review


We will continue to monitor and discharge disposition in process via social work





Vitals/I&O


Vitals/I&O:





                                   Vital Signs








  Date Time  Temp Pulse Resp B/P (MAP) Pulse Ox O2 Delivery O2 Flow Rate FiO2


 


8/15/21 11:00 97.0 78 22 109/64 (79) 99 vapotherm 40.0 





 97.0       














                                    I & O   


 


 8/14/21 8/14/21 8/15/21





 15:00 23:00 07:00


 


Intake Total 240 ml 360 ml 500 ml


 


Output Total   1600 ml


 


Balance 240 ml 360 ml -1100 ml











Physical Exam


Physical Exam:








GEN axo3 male on BIPAP


HEENT:  Normocephalic, atraumatic.  Anicteric. 


NECK:  Supple, no JVD.


LUNGS:  Crackles bilaterally.   


HEART:  S1, S2.  


ABDOMEN:  Soft, nontender, nondistended.  Bowel sounds present.


EXTREMITIES:  No edema, no cyanosis.


DERMATOLOGIC:  Warm, dry, no generalized rash.


NEUROLOGIC:  Alert, oriented, grossly nonfocal.


PSYCHIATRIC:  Calm and cooperative. 


 PIV looks clean.


General:  Alert, mild distress


Heart:  Regular rate (tele reg,  reviewed)


Abdomen:  Normal bowel sounds


Extremities:  No clubbing


Skin:  No rashes





Review of Systems


Review of Systems:


Denies HA or dizziness


Denies fevers or chills


Bowel and bladder continent





Assessment and Plan


Assessmemt and Plan


SEPSIS, was POA


Severe malnutrition was POA, with obesity, BMI 32


Acute hypoxic respiratory failure


COVID-19 pneumonia requiring 


transaminitis, 


 


Plan


COVID protocol


-Continue supplemental oxygen on Vapotherm, currently on 85% and 40 liters will 

slowly wean as tolerated, slowly improving


-Completed IV Remdesivir


-Completed steroid taper


-Continue IV vitamins


-PRN treatment of cough


Appreciate Pulmonology and Infectious Disease Input


Trend labs


DVT prophylaxis 


Full code


Cardiac monitoring


Prognosis guarded


CC time 32 minutes


Social work for DC planning  if insurance approves for LTAC or improved O2 

requirement





Comment


Review of Relevant


I have reviewed the following items genesis (where applicable) has been applied.





Justifications for Admission


Other Justification














SHIRIN SCHNEIDER III DO           Aug 15, 2021 13:13

## 2021-08-16 VITALS — SYSTOLIC BLOOD PRESSURE: 133 MMHG | DIASTOLIC BLOOD PRESSURE: 81 MMHG

## 2021-08-16 VITALS — DIASTOLIC BLOOD PRESSURE: 85 MMHG | SYSTOLIC BLOOD PRESSURE: 133 MMHG

## 2021-08-16 VITALS — DIASTOLIC BLOOD PRESSURE: 67 MMHG | SYSTOLIC BLOOD PRESSURE: 131 MMHG

## 2021-08-16 VITALS — SYSTOLIC BLOOD PRESSURE: 133 MMHG | DIASTOLIC BLOOD PRESSURE: 92 MMHG

## 2021-08-16 VITALS — SYSTOLIC BLOOD PRESSURE: 132 MMHG | DIASTOLIC BLOOD PRESSURE: 79 MMHG

## 2021-08-16 RX ADMIN — IPRATROPIUM BROMIDE AND ALBUTEROL SCH PUFF: 20; 100 SPRAY, METERED RESPIRATORY (INHALATION) at 09:20

## 2021-08-16 RX ADMIN — Medication SCH MG: at 08:53

## 2021-08-16 RX ADMIN — ENOXAPARIN SODIUM SCH MG: 40 INJECTION SUBCUTANEOUS at 22:41

## 2021-08-16 RX ADMIN — IPRATROPIUM BROMIDE AND ALBUTEROL SCH PUFF: 20; 100 SPRAY, METERED RESPIRATORY (INHALATION) at 20:00

## 2021-08-16 RX ADMIN — WATER PRN ML: 1 INJECTION INTRAVENOUS at 01:35

## 2021-08-16 RX ADMIN — CETIRIZINE HYDROCHLORIDE SCH MG: 10 TABLET, FILM COATED ORAL at 08:53

## 2021-08-16 RX ADMIN — FAMOTIDINE SCH MG: 20 TABLET ORAL at 22:42

## 2021-08-16 RX ADMIN — CHOLECALCIFEROL CAP 125 MCG (5000 UNIT) SCH UNIT: 125 CAP at 08:53

## 2021-08-16 RX ADMIN — ZINC SULFATE CAP 220 MG (50 MG ELEMENTAL ZN) SCH MG: 220 (50 ZN) CAP at 08:53

## 2021-08-16 RX ADMIN — METHYLPREDNISOLONE SODIUM SUCCINATE SCH MG: 125 INJECTION, POWDER, FOR SOLUTION INTRAMUSCULAR; INTRAVENOUS at 08:53

## 2021-08-16 RX ADMIN — IPRATROPIUM BROMIDE AND ALBUTEROL SCH PUFF: 20; 100 SPRAY, METERED RESPIRATORY (INHALATION) at 16:00

## 2021-08-16 RX ADMIN — IPRATROPIUM BROMIDE AND ALBUTEROL SCH PUFF: 20; 100 SPRAY, METERED RESPIRATORY (INHALATION) at 22:41

## 2021-08-16 NOTE — PDOC
PULMONARY PROGRESS NOTES


DATE: 8/16/21 


TIME: 09:14


Subjective


Patient remains on Vapotherm 40 L, 80%


Reports episodes of shortness of breath with ambulation overnight, and fatigue 

this morning


Vitals





Vital Signs








  Date Time  Temp Pulse Resp B/P (MAP) Pulse Ox O2 Delivery O2 Flow Rate FiO2


 


8/16/21 08:10     96 VAPOTHERM 40.0 


 


8/16/21 07:00 96.8 87 20 133/92 (106)    





 96.8       








Comments


Pt. seen during covid -19 pandemic visual exam preformed 


no distress 


Vapotherm 90%


no obvious rash or edema


Medications





Active Scripts








 Medications  Dose


 Route/Sig


 Max Daily Dose Days Date Category Dose


Instructions


 


 Morphine Sulfate


 2 Mg/1 Ml


 Cartridge  2 Mg


 IV PRN Q4HRS PRN


   7/27/21 Reported 


 


 Zinc Sulfate 50


 Mg Tablet  220 Mg


 PO DAILY


   7/27/21 Reported 


 


 Methylprednisolone


 Sod Succ 125 Mg


 Vial  60 Mg


 IJ Q8HRS


   7/27/21 Reported 


 


 Combivent


 Respimat Inhal


  (Ipratropium/Albuterol


 Sulfate) 4 Gm


 Aer.w.adap  2 Inh


 IH QID


   7/27/21 Reported 


 


 Pepcid


  (Famotidine) 20


 Mg Tablet  20 Mg


 PO HS


   7/27/21 Reported 


 


 Lovenox


  (Enoxaparin


 Sodium) 30 Mg/0.3


 Ml Disp.syrin  30 Mg


 SQ DAILY


   7/27/21 Reported 


 


 Vitamin D3 **


  (Vitamin D) 125


 Mcg Capsule  125 Mcg


 PO DAILY


   7/27/21 Reported  5,000 UNITS = 125 MCG


 


 Zyrtec


  (Cetirizine Hcl)


 10 Mg Tablet  1 Tab


 PO DAILY


   7/27/21 Reported 


 


 Ascorbic Acid 500


 Mg Tablet  2 Mg


 PO DAILY


   7/27/21 Reported 


 


 Acetaminophen 325


 Mg Tablet  2 Tab


 PO PRN Q6HRS PRN


  30 7/27/21 Reported 


 


 No Known


 Medications Prior


 To Admisstion


  (Info)  Each  1 Each


 


   8/25/20 Reported 











Impression


.


IMPRESSION:


1.  Acute hypoxemic respiratory failure secondary to COVID-19 viral 

pneumonia./Acute lung injury and early ARDS.  Slowly improving


2.  COVID-19 viral pneumonia.


3.  Abnormal chest x-ray, compatible with viral pneumonia.


4.  Nonspecific anxiety, possible clinical depression





Plan


.


Updated 8/16/21


Continue supplemental oxygen to keep sats above 92% currently on Vapotherm, 

currently on 80% and 40 liters will slowly wean as tolerated, slowly improving


Symptomatic treatment of cough


Continue steroids with taper


PT/OT


DVT/GI PPX :lovenox 


D/W RN and RT


Social work for DC planning once oxygen requirement improves or if insurance 

approves for LTAC














Updated 8/15/21


Continue supplemental oxygen to keep sats above 92% currently on Vapotherm, 

currently on 85% and 40 liters will slowly wean as tolerated, slowly improving


Symptomatic treatment of cough


Continue steroids with taper


PT/OT


DVT/GI PPX :lovenox 


D/W RN and RT


Social work for DC planning once oxygen requirement improves or if insurance 

approves for LTAC











Updated 8/14/21


Continue supplemental oxygen to keep sats above 92% currently on Vapotherm, 

currently on 85% and 40 liters will slowly wean as tolerated, slowly improving


Symptomatic treatment of cough


Continue steroids with taper


PT/OT


DVT/GI PPX :lovenox 


D/W RN and RT


Social work for DC planning once oxygen requirement improves or if insurance 

approves for LTAC








Updated 8/13/21


Continue supplemental oxygen to keep sats above 92% currently on Vapotherm, 

currently on 90% and 40 liters will slowly wean as tolerated, slowly improving


Symptomatic treatment of cough


Continue steroids with taper


PT/OT


DVT/GI PPX :lovenox 


D/W RN and RT


Social work for DC planning 








Updated 8/12/21


Continue supplemental oxygen to keep sats above 92% currently on Vapotherm, 

currently on 90% and 40 liters will slowly wean as tolerated, slowly improving


Symptomatic treatment of cough


Continue steroids with taper


PT/OT


DVT/GI PPX :lovenox 


D/W RN and RT


Social work for DC planning 





Updated 8/11/21


Continue supplemental oxygen to keep sats above 92% currently on Vapotherm, 

currently on 90% and 40 liters will slowly wean as tolerated 


S/P remdesivir


Continue steroids with taper, now on daily dosing 


PT/OT


DVT/GI PPX :lovenox 


D/W RN and RT


Social work for DC planning 





Updated 8/10/21


Continue supplemental oxygen to keep sats above 92% currently on Vapotherm and 

tolerating well.  90% FiO2 and 40 L flow.


S/P remdesivir


Continue steroids with taper


Off antibiotics.


PT/OT


DVT/GI PPX :lovenox 


D/W RN and RT


remains critical but stable and slowly improving











ASHLEY YEAGER MD                 Aug 16, 2021 09:16

## 2021-08-16 NOTE — PDOC
PROGRESS NOTES


Date of Service:


DATE: 8/16/21 


TIME: 09:55





Chief Complaint


Chief Complaint


SEPSIS, was POA


Severe malnutrition was POA, with obesity, BMI 32


Acute hypoxic respiratory failure


COVID-19 pneumonia r


transaminitis





History of Present Illness


History of Present Illness


Mr Read is a 56-year-old male with no significant past medical history except 

for degenerative joint disease who was transferred from [facility]  

with complaints of fever, headache, dry cough, shortness of breath for the past 

3 days, admitted on 7/26/2021.  Patient stated that he was actually exposed from

his son he was worsening and required to come to the ED at [facility] and he was

found to be hypoxic and required 2 L of nasal cannula oxygen.  Chest x-ray over 

there showed bilateral mild bibasilar infiltrates.  Covid test was positive and 

patient was transferred to [facility] for pulmonary evaluation and treatment.  Patient 

was started on Remdesivir and IV steroids.  Currently patient is requiring 15 L 

high flow nasal cannula and IV steroids.





7/30: No acute events overnight.  Patient was saturating 92% on 15 L nasal 

cannula and he had desatted down to 70% after he had to blow his nose.  Patient 

was placed on BiPAP at 100% FiO2.  Chest leg appears to be worsening.


7/31: Last dose of remdesivir. Could not tolerate vapotherm and is now on BIPAP.


8/1:  poor Po intake, dry,   dark urine


8/2: Desaturates to 72% when he takes off his BiPAP.  Advised to get back on 

BIPAP after eating. he is able to place himself back on. Frustrated with the 

severity of his hypoxia.


8/3: Status post remdesivir. Still requiring BiPAP 75% FiO2 with significant 

desaturations when he is not wearing assessment with nonrebreather O2.


8/4: Still with significant desaturations when he raises BiPAP even on 15 L high

flow nasal cannula.  Discussed transitioning back to Vapotherm if we can treat 

his anxiety.  He is amenable to this.  He is frustrated with staying in the 

hospital understands he may be a longer term COVID recovery and is amenable to 

LTACH referral.  I discussed with pulmonology.


8/5: Afebrile. 70% FiO2 on BiPAP 12/6.  Feels better.  Did not tolerated 

Vapotherm.  Significant desaturations when he transitions to high flow nasal 

cannula O2 while eating.  Discussed referral to LTACH with pulmonology and ID as

it appears he will need a longer COVID recovery.


8/6: Patient on 70% FiO2.  Via BiPAP 12/6 when seen by pulmonology. Feels better

and is seen on vapotherm 40l/min 100% FiO2 with saturations 89%, tolerating 

well.





8/8/2021


patient seen at edge of bed. FiO2 at 89%. Dropped to low 80s while talking to 

us. Educated patient on conserving energy and working to keep FiO2 higher.


D/W RN


Chart Reviewed





8/9/2021


Patient was seen and examined in bed. FiO2 of 87% while on Vapotherm with 40L of

90% O2.


D/W RN, Chart Review


Patient has not had a bowel movement in two days. 


Patient feels he is doing better but his FiO2 still drops with activity or 

speaking


Discussed need to rest and focus on breathing to maintain proper FiO2.





8/10/2021


Patient was resting in bed with NAD


Seen and examined in bed. FiO2 of 92% while on Vapotherm with 40L of 90% O2.


D/W RN


Chart Review


Discussed need to rest and focus on breathing to maintain proper FiO2





8/11/2021


Patient was awake, alert and NAD


Seen and examine in bed.


FiO2 92% while on Vapotherm with 40L of 90% O2


Discussed that we will continue to monitor for clinical improvement





8/12/2021


Patient resting upon entry into room. Upon exam in bed he was awake, alert and 

NAD


FiO2 90% while on Vapotherm with 40L of 90% O2


Discussed continued monitoring and that he is making slow improvements.


Patient was encouraged by improvements and eager to return to as high of 

function as possible.





8/13/2021


Patient awake, alert and on computer.


FiO2 92% while on Vapotherm with 40L of 90% O2


Continuing to make slow improvements


Discussed continuing treatment plan at this time





8/14/2021


Patient resting upon entry into room. Upon exam in bed he was awake, alert and 

NAD


FiO2 90% while on Vapotherm with 40L of 85% O2


We will continue to monitor as he is making slow improvements


Discharge plan will be created as he continues with current treatment





8/15/2021


Patient in prone position upon entry into room. States that he placed himself in

this position after he had read that it would help online. 


Upon exam in bed he was awake, alert and NAD


FiO2 90% while on Vapotherm with 40L of 85% O2


D/W RN, Chart review


We will continue to monitor and discharge disposition in process via social work





Vitals


Vitals





Vital Signs








  Date Time  Temp Pulse Resp B/P (MAP) Pulse Ox O2 Delivery O2 Flow Rate FiO2


 


8/16/21 08:10     96 VAPOTHERM 40.0 


 


8/16/21 07:00 96.8 87 20 133/92 (106)    





 96.8       











Physical Exam


Physical Exam








GEN axo3 male on BIPAP


HEENT:  Normocephalic, atraumatic.  Anicteric. 


NECK:  Supple, no JVD.


LUNGS:  Crackles bilaterally.   


HEART:  S1, S2.  


ABDOMEN:  Soft, nontender, nondistended.  Bowel sounds present.


EXTREMITIES:  No edema, no cyanosis.


DERMATOLOGIC:  Warm, dry, no generalized rash.


NEUROLOGIC:  Alert, oriented, grossly nonfocal.


PSYCHIATRIC:  Calm and cooperative. 


 PIV looks clean.


General:  Alert, mild distress


Heart:  Regular rate (tele reg,  reviewed)


Abdomen:  Normal bowel sounds


Extremities:  No clubbing


Skin:  No rashes





Comment


Review of Relevant


I have reviewed the following items genesis (where applicable) has been applied.


Medications





Current Medications


Acetaminophen (Tylenol) 650 mg PRN Q6HRS  PRN PO MILD PAIN / TEMP > 100.3'F Last

administered on 7/27/21at 20:09;  Start 7/27/21 at 19:00;  Stop 7/27/21 at 

20:15;  Status DC


Enoxaparin Sodium (Lovenox Per Pharmacy Prophylaxis Dosing) 1 each PRN DAILY  

PRN MC SEE COMMENTS;  Start 7/27/21 at 19:45


Enoxaparin Sodium (Lovenox 40mg Syringe) 40 mg Q24H SQ  Last administered on 

8/15/21at 21:25;  Start 7/27/21 at 20:00


Remdesivir 200 mg/ Sodium Chloride 210 ml @  210 mls/hr 1X  ONCE IV  Last 

administered on 7/27/21at 20:08;  Start 7/27/21 at 20:00;  Stop 7/27/21 at 20:

59;  Status DC


Remdesivir 100 mg/ Sodium Chloride 230 ml @  460 mls/hr Q24H IV  Last 

administered on 7/31/21at 21:20;  Start 7/28/21 at 20:00;  Stop 7/31/21 at 

20:29;  Status DC


Acetaminophen (Tylenol) 650 mg PRN Q6HRS  PRN PO MILD PAIN OR FEVER Last admin

istered on 8/15/21at 21:38;  Start 7/27/21 at 19:45


Ascorbic Acid (Vitamin C) 1,000 mg DAILY PO  Last administered on 8/16/21at 

08:53;  Start 7/28/21 at 09:00


Cetirizine HCl (ZyrTEC) 10 mg DAILY PO  Last administered on 8/16/21at 08:53;  

Start 7/28/21 at 09:00


Vitamin D (Vitamin D3) 5,000 unit DAILY PO  Last administered on 8/16/21at 

08:53;  Start 7/28/21 at 09:00


Enoxaparin Sodium (Lovenox 30mg Syringe) 30 mg Q24H SQ ;  Start 7/27/21 at 

21:00;  Status Cancel


Famotidine (Pepcid) 20 mg HS PO  Last administered on 8/15/21at 21:25;  Start 

7/27/21 at 21:00


Non-Formulary Medication (Ipratropium/ Albuterol Sulfate (Combivent Respimat 

Inhal)) 2 inh QID IH ;  Start 7/27/21 at 21:00;  Status UNV


Methylprednisolone Sodium Succinate (SOLU-Medrol 125MG VIAL) 60 mg Q8HRS IV  

Last administered on 8/6/21at 05:41;  Start 7/27/21 at 22:00;  Stop 8/6/21 at 

10:13;  Status DC


Morphine Sulfate (Morphine Sulfate) 2 mg PRN Q2HR  PRN IVP MODERATE TO SEVERE 

PAIN Last administered on 8/11/21at 22:56;  Start 7/27/21 at 20:30


Zinc Sulfate (Orazinc) 220 mg DAILY PO  Last administered on 8/16/21at 08:53;  

Start 7/28/21 at 09:00


Albuterol/ Ipratropium (Duoneb) 3 ml RTQID NEB ;  Start 7/27/21 at 21:00;  

Status Cancel


Albuterol/ Ipratropium (Combivent Respimat  Mcg) 2 puff RTQID INH  Last 

administered on 8/16/21at 09:20;  Start 7/27/21 at 21:00


Alprazolam (Xanax) 0.25 mg PRN Q8HRS  PRN PO ANXIETY / AGITATION Last 

administered on 7/29/21at 08:59;  Start 7/28/21 at 15:15;  Stop 7/30/21 at 

14:14;  Status DC


Lorazepam (Ativan Inj) 1 mg PRN Q8HRS  PRN IVP ANXIETY / AGITATION Last 

administered on 7/29/21at 14:05;  Start 7/29/21 at 11:15;  Stop 7/30/21 at 

14:14;  Status DC


Thiamine Mononitrate (Vitamin B-1) 100 mg DAILY PO  Last administered on 

8/16/21at 08:53;  Start 7/30/21 at 09:00


Sterile Water (WATER for RESP) 1,000 ml CONT  PRN INH VIA VAPOTHERM DEVICE Last 

administered on 8/16/21at 01:35;  Start 7/29/21 at 19:15


Haloperidol Lactate (Haldol Inj) 5 mg PRN Q6HRS  PRN IVP AGITATION;  Start 

7/30/21 at 14:15


Insulin Human Lispro (HumaLOG) 0-7 UNITS TIDWMEALS SQ ;  Start 7/31/21 at 12:00;

 Stop 8/1/21 at 09:08;  Status DC


Dextrose (Dextrose 50%-Water Syringe) 12.5 gm PRN Q15MIN  PRN IV SEE COMMENTS;  

Start 7/31/21 at 10:15


Potassium Chloride/Dextrose/ Sod Cl 1,000 ml @  100 mls/hr Q10H IV  Last ad

ministered on 8/1/21at 16:54;  Start 7/31/21 at 10:15;  Stop 8/1/21 at 22:33;  

Status DC


Insulin Glargine (Lantus Syringe) 8 unit DAILY SQ ;  Start 8/1/21 at 09:00;  

Stop 8/1/21 at 08:50;  Status DC


Tocilizumab 800 mg/Sodium Chloride 100 ml @  100 mls/hr 1X  ONCE IV ;  Start 

8/2/21 at 09:00;  Stop 8/2/21 at 09:59;  Status Cancel


Methylprednisolone Sodium Succinate (SOLU-Medrol 40MG VIAL) 60 mg BID66 IV  Last

administered on 8/11/21at 05:45;  Start 8/6/21 at 18:00;  Stop 8/11/21 at 09:54;

 Status DC


Guaifenesin (Mucinex) 1,200 mg BID PO  Last administered on 8/16/21at 08:53;  

Start 8/8/21 at 21:00


Polyethylene Glycol (miraLAX PACKET) 17 gm PRN BID  PRN PO CONSTIPATION;  Start 

8/9/21 at 10:30


Methylprednisolone Sodium Succinate (SOLU-Medrol 125MG VIAL) 60 mg DAILY IV  

Last administered on 8/16/21at 08:53;  Start 8/12/21 at 09:00





Active Scripts


Active


Reported


Morphine Sulfate 2 Mg/1 Ml Cartridge 2 Mg IV PRN Q4HRS PRN


Zinc Sulfate 50 Mg Tablet 220 Mg PO DAILY


Methylprednisolone Sod Succ 125 Mg Vial 60 Mg IJ Q8HRS


Combivent Respimat Inhal (Ipratropium/Albuterol Sulfate) 4 Gm Aer.w.adap 2 Inh 

IH QID


Pepcid (Famotidine) 20 Mg Tablet 20 Mg PO HS


Lovenox (Enoxaparin Sodium) 30 Mg/0.3 Ml Disp.syrin 30 Mg SQ DAILY


Vitamin D3 ** (Vitamin D) 125 Mcg Capsule 125 Mcg PO DAILY


     5,000 UNITS = 125 MCG


Zyrtec (Cetirizine Hcl) 10 Mg Tablet 1 Tab PO DAILY


Ascorbic Acid 500 Mg Tablet 2 Mg PO DAILY


Acetaminophen 325 Mg Tablet 2 Tab PO PRN Q6HRS PRN 30 Days


No Known Medications Prior To Admisstion (Info)  Each 1 Each 


Vitals/I & O





Vital Sign - Last 24 Hours








 8/15/21 8/15/21 8/15/21 8/15/21





 11:00 11:45 15:00 16:19


 


Temp 97.0  97.8 





 97.0  97.8 


 


Pulse 78  90 


 


Resp 22  20 


 


B/P (MAP) 109/64 (79)  103/54 (70) 


 


Pulse Ox 99 94 96 93


 


O2 Delivery vapotherm VAPOTHERM vapotherm VAPOTHERM


 


O2 Flow Rate 40.0 40.0 40.0 40.0


 


    





    





 8/15/21 8/15/21 8/15/21 8/15/21





 19:00 19:35 20:43 23:30


 


Temp 98.0   





 98.0   


 


Pulse 86   


 


Resp 26   


 


B/P (MAP) 182/73 (109)   


 


Pulse Ox 96  93 94


 


O2 Delivery vapotherm Vapotherm VAPOTHERM VAPOTHERM


 


O2 Flow Rate 40.0 40.0 40.0 40.0


 


    





    





 8/15/21 8/16/21 8/16/21 8/16/21





 23:38 02:00 02:44 05:15


 


Temp 98.0   





 98.0   


 


Pulse 95  63 


 


Resp 26   


 


B/P (MAP) 154/90 (111)   


 


Pulse Ox 97 95 96 95


 


O2 Delivery vapotherm VAPOTHERM vapotherm VAPOTHERM


 


O2 Flow Rate 40.0 40.0 40.0 40.0


 


    





    





 8/16/21 8/16/21  





 07:00 08:10  


 


Temp 96.8   





 96.8   


 


Pulse 87   


 


Resp 20   


 


B/P (MAP) 133/92 (106)   


 


Pulse Ox 96 96  


 


O2 Delivery Nasal Cannula VAPOTHERM  


 


O2 Flow Rate 4.0 40.0  














Intake and Output   


 


 8/15/21 8/15/21 8/16/21





 15:00 23:00 07:00


 


Intake Total 610 ml 510 ml 150 ml


 


Output Total 660 ml 1500 ml 900 ml


 


Balance -50 ml -990 ml -750 ml











Justicifation of Admission Dx:


Justifications for Admission:


Justification of Admission Dx:  Yes


Sepsis:  Hypoxemia











EMMA LORD MD          Aug 16, 2021 09:55

## 2021-08-17 VITALS — SYSTOLIC BLOOD PRESSURE: 131 MMHG | DIASTOLIC BLOOD PRESSURE: 81 MMHG

## 2021-08-17 VITALS — DIASTOLIC BLOOD PRESSURE: 81 MMHG | SYSTOLIC BLOOD PRESSURE: 135 MMHG

## 2021-08-17 VITALS — SYSTOLIC BLOOD PRESSURE: 134 MMHG | DIASTOLIC BLOOD PRESSURE: 89 MMHG

## 2021-08-17 VITALS — DIASTOLIC BLOOD PRESSURE: 87 MMHG | SYSTOLIC BLOOD PRESSURE: 141 MMHG

## 2021-08-17 VITALS — DIASTOLIC BLOOD PRESSURE: 80 MMHG | SYSTOLIC BLOOD PRESSURE: 130 MMHG

## 2021-08-17 LAB
ANION GAP SERPL CALC-SCNC: 6 MMOL/L (ref 6–14)
BUN SERPL-MCNC: 23 MG/DL (ref 8–26)
CALCIUM SERPL-MCNC: 8.5 MG/DL (ref 8.5–10.1)
CHLORIDE SERPL-SCNC: 103 MMOL/L (ref 98–107)
CO2 SERPL-SCNC: 29 MMOL/L (ref 21–32)
CREAT SERPL-MCNC: 0.7 MG/DL (ref 0.7–1.3)
ERYTHROCYTE [DISTWIDTH] IN BLOOD BY AUTOMATED COUNT: 13.5 % (ref 11.5–14.5)
GFR SERPLBLD BASED ON 1.73 SQ M-ARVRAT: 116.7 ML/MIN
GLUCOSE SERPL-MCNC: 134 MG/DL (ref 70–99)
HCT VFR BLD CALC: 42.6 % (ref 39–53)
HGB BLD-MCNC: 14.6 G/DL (ref 13–17.5)
MCH RBC QN AUTO: 32 PG (ref 25–35)
MCHC RBC AUTO-ENTMCNC: 34 G/DL (ref 31–37)
MCV RBC AUTO: 94 FL (ref 79–100)
PLATELET # BLD AUTO: 110 X10^3/UL (ref 140–400)
POTASSIUM SERPL-SCNC: 3.9 MMOL/L (ref 3.5–5.1)
RBC # BLD AUTO: 4.56 X10^6/UL (ref 4.3–5.7)
SODIUM SERPL-SCNC: 138 MMOL/L (ref 136–145)
WBC # BLD AUTO: 13 X10^3/UL (ref 4–11)

## 2021-08-17 RX ADMIN — METHYLPREDNISOLONE SODIUM SUCCINATE SCH MG: 125 INJECTION, POWDER, FOR SOLUTION INTRAMUSCULAR; INTRAVENOUS at 08:49

## 2021-08-17 RX ADMIN — ENOXAPARIN SODIUM SCH MG: 40 INJECTION SUBCUTANEOUS at 21:32

## 2021-08-17 RX ADMIN — Medication SCH MG: at 08:50

## 2021-08-17 RX ADMIN — CHOLECALCIFEROL CAP 125 MCG (5000 UNIT) SCH UNIT: 125 CAP at 08:50

## 2021-08-17 RX ADMIN — IPRATROPIUM BROMIDE AND ALBUTEROL SCH PUFF: 20; 100 SPRAY, METERED RESPIRATORY (INHALATION) at 21:32

## 2021-08-17 RX ADMIN — ZINC SULFATE CAP 220 MG (50 MG ELEMENTAL ZN) SCH MG: 220 (50 ZN) CAP at 08:50

## 2021-08-17 RX ADMIN — IPRATROPIUM BROMIDE AND ALBUTEROL SCH PUFF: 20; 100 SPRAY, METERED RESPIRATORY (INHALATION) at 12:00

## 2021-08-17 RX ADMIN — IPRATROPIUM BROMIDE AND ALBUTEROL SCH PUFF: 20; 100 SPRAY, METERED RESPIRATORY (INHALATION) at 16:00

## 2021-08-17 RX ADMIN — IPRATROPIUM BROMIDE AND ALBUTEROL SCH PUFF: 20; 100 SPRAY, METERED RESPIRATORY (INHALATION) at 08:00

## 2021-08-17 RX ADMIN — CETIRIZINE HYDROCHLORIDE SCH MG: 10 TABLET, FILM COATED ORAL at 08:50

## 2021-08-17 RX ADMIN — FAMOTIDINE SCH MG: 20 TABLET ORAL at 21:32

## 2021-08-17 NOTE — NUR
SS following up with discharge planning. SS reviewed pt chart and discussed with pt RN. Pt 
is currently on Vapotherm at 75%. COVID19 positive. Pt on IV Solu Medrol. PT/OT recommended 
home. [employer] denied pt for LTACH placement. SS will continue to follow for discharge 
planning.

## 2021-08-17 NOTE — PDOC
PROGRESS NOTES


Date of Service:


DATE: 8/17/21 


TIME: 09:58





Chief Complaint


Chief Complaint


SEPSIS, was POA


Severe malnutrition was POA, with obesity, BMI 32


Acute hypoxic respiratory failure


COVID-19 pneumonia r


transaminitis





History of Present Illness


History of Present Illness


Mr Read is a 56-year-old male with no significant past medical history except 

for degenerative joint disease who was transferred from [facility]  

with complaints of fever, headache, dry cough, shortness of breath for the past 

3 days, admitted on 7/26/2021.  Patient stated that he was actually exposed from

his son he was worsening and required to come to the ED at [facility] and he was

found to be hypoxic and required 2 L of nasal cannula oxygen.  Chest x-ray over 

there showed bilateral mild bibasilar infiltrates.  Covid test was positive and 

patient was transferred to [facility] for pulmonary evaluation and treatment.  Patient 

was started on Remdesivir and IV steroids.  Currently patient is requiring 15 L 

high flow nasal cannula and IV steroids.





7/30: No acute events overnight.  Patient was saturating 92% on 15 L nasal 

cannula and he had desatted down to 70% after he had to blow his nose.  Patient 

was placed on BiPAP at 100% FiO2.  Chest leg appears to be worsening.


7/31: Last dose of remdesivir. Could not tolerate vapotherm and is now on BIPAP.


8/1:  poor Po intake, dry,   dark urine


8/2: Desaturates to 72% when he takes off his BiPAP.  Advised to get back on 

BIPAP after eating. he is able to place himself back on. Frustrated with the 

severity of his hypoxia.


8/3: Status post remdesivir. Still requiring BiPAP 75% FiO2 with significant 

desaturations when he is not wearing assessment with nonrebreather O2.


8/4: Still with significant desaturations when he raises BiPAP even on 15 L high

flow nasal cannula.  Discussed transitioning back to Vapotherm if we can treat 

his anxiety.  He is amenable to this.  He is frustrated with staying in the 

hospital understands he may be a longer term COVID recovery and is amenable to 

LTACH referral.  I discussed with pulmonology.


8/5: Afebrile. 70% FiO2 on BiPAP 12/6.  Feels better.  Did not tolerated 

Vapotherm.  Significant desaturations when he transitions to high flow nasal 

cannula O2 while eating.  Discussed referral to LTACH with pulmonology and ID as

it appears he will need a longer COVID recovery.


8/6: Patient on 70% FiO2.  Via BiPAP 12/6 when seen by pulmonology. Feels better

and is seen on vapotherm 40l/min 100% FiO2 with saturations 89%, tolerating 

well.





8/8/2021


patient seen at edge of bed. FiO2 at 89%. Dropped to low 80s while talking to 

us. Educated patient on conserving energy and working to keep FiO2 higher.


D/W RN


Chart Reviewed





8/9/2021


Patient was seen and examined in bed. FiO2 of 87% while on Vapotherm with 40L of

90% O2.


D/W RN, Chart Review


Patient has not had a bowel movement in two days. 


Patient feels he is doing better but his FiO2 still drops with activity or 

speaking


Discussed need to rest and focus on breathing to maintain proper FiO2.





8/10/2021


Patient was resting in bed with NAD


Seen and examined in bed. FiO2 of 92% while on Vapotherm with 40L of 90% O2.


D/W RN


Chart Review


Discussed need to rest and focus on breathing to maintain proper FiO2





8/11/2021


Patient was awake, alert and NAD


Seen and examine in bed.


FiO2 92% while on Vapotherm with 40L of 90% O2


Discussed that we will continue to monitor for clinical improvement





8/12/2021


Patient resting upon entry into room. Upon exam in bed he was awake, alert and 

NAD


FiO2 90% while on Vapotherm with 40L of 90% O2


Discussed continued monitoring and that he is making slow improvements.


Patient was encouraged by improvements and eager to return to as high of 

function as possible.





8/13/2021


Patient awake, alert and on computer.


FiO2 92% while on Vapotherm with 40L of 90% O2


Continuing to make slow improvements


Discussed continuing treatment plan at this time





8/14/2021


Patient resting upon entry into room. Upon exam in bed he was awake, alert and 

NAD


FiO2 90% while on Vapotherm with 40L of 85% O2


We will continue to monitor as he is making slow improvements


Discharge plan will be created as he continues with current treatment





8/15/2021


Patient in prone position upon entry into room. States that he placed himself in

this position after he had read that it would help online. 


Upon exam in bed he was awake, alert and NAD


FiO2 90% while on Vapotherm with 40L of 85% O2


D/W RN, Chart review


We will continue to monitor and discharge disposition in process via social work








8/16/2021


Patient in prone position upon entry into room. States that he placed himself in

this position after he had read that it would help online. 


Upon exam in bed he was awake, alert and NAD


FiO2 90% while on Vapotherm with 40L of 85% O2


D/W RN, Chart review


We will continue to monitor and discharge disposition in process via social work


Patient remains on Vapotherm 40 L, 80%


afebrile 








8/17/2021


Patient in prone position upon entry into room. States that he placed himself in

this position after he had read that it would help online. 


Upon exam in bed he was awake, alert and NAD


FiO2 90% while on Vapotherm with 40L of 85% O2


D/W RN, Chart review


We will continue to monitor and discharge disposition in process via social work


Patient remains on Vapotherm 40 L, 80%


afebrile





Vitals


Vitals





Vital Signs








  Date Time  Temp Pulse Resp B/P (MAP) Pulse Ox O2 Delivery O2 Flow Rate FiO2


 


8/17/21 09:21     93 VAPOTHERM 40.0 


 


8/17/21 07:00 97.3 85 18 131/81 (98)    





 97.3       











Physical Exam


Physical Exam








GEN axo3 male on BIPAP


HEENT:  Normocephalic, atraumatic.  Anicteric. 


NECK:  Supple, no JVD.


LUNGS:  Crackles bilaterally.   


HEART:  S1, S2.  


ABDOMEN:  Soft, nontender, nondistended.  Bowel sounds present.


EXTREMITIES:  No edema, no cyanosis.


DERMATOLOGIC:  Warm, dry, no generalized rash.


NEUROLOGIC:  Alert, oriented, grossly nonfocal.


PSYCHIATRIC:  Calm and cooperative. 


 PIV looks clean.


General:  Alert, mild distress


Heart:  Regular rate (tele reg,  reviewed)


Abdomen:  Normal bowel sounds, Soft


Extremities:  No clubbing


Skin:  No rashes





Labs


LABS





Laboratory Tests








Test


 8/17/21


05:10


 


White Blood Count


 13.0 x10^3/uL


(4.0-11.0)


 


Red Blood Count


 4.56 x10^6/uL


(4.30-5.70)


 


Hemoglobin


 14.6 g/dL


(13.0-17.5)


 


Hematocrit


 42.6 %


(39.0-53.0)


 


Mean Corpuscular Volume 94 fL () 


 


Mean Corpuscular Hemoglobin 32 pg (25-35) 


 


Mean Corpuscular Hemoglobin


Concent 34 g/dL


(31-37)


 


Red Cell Distribution Width


 13.5 %


(11.5-14.5)


 


Platelet Count


 110 x10^3/uL


(140-400)


 


Sodium Level


 138 mmol/L


(136-145)


 


Potassium Level


 3.9 mmol/L


(3.5-5.1)


 


Chloride Level


 103 mmol/L


()


 


Carbon Dioxide Level


 29 mmol/L


(21-32)


 


Anion Gap 6 (6-14) 


 


Blood Urea Nitrogen


 23 mg/dL


(8-26)


 


Creatinine


 0.7 mg/dL


(0.7-1.3)


 


Estimated GFR


(Cockcroft-Gault) 116.7 





 


Glucose Level


 134 mg/dL


(70-99)


 


Calcium Level


 8.5 mg/dL


(8.5-10.1)











Comment


Review of Relevant


I have reviewed the following items genesis (where applicable) has been applied.


Labs





Laboratory Tests








Test


 8/17/21


05:10


 


White Blood Count


 13.0 x10^3/uL


(4.0-11.0)


 


Red Blood Count


 4.56 x10^6/uL


(4.30-5.70)


 


Hemoglobin


 14.6 g/dL


(13.0-17.5)


 


Hematocrit


 42.6 %


(39.0-53.0)


 


Mean Corpuscular Volume 94 fL () 


 


Mean Corpuscular Hemoglobin 32 pg (25-35) 


 


Mean Corpuscular Hemoglobin


Concent 34 g/dL


(31-37)


 


Red Cell Distribution Width


 13.5 %


(11.5-14.5)


 


Platelet Count


 110 x10^3/uL


(140-400)


 


Sodium Level


 138 mmol/L


(136-145)


 


Potassium Level


 3.9 mmol/L


(3.5-5.1)


 


Chloride Level


 103 mmol/L


()


 


Carbon Dioxide Level


 29 mmol/L


(21-32)


 


Anion Gap 6 (6-14) 


 


Blood Urea Nitrogen


 23 mg/dL


(8-26)


 


Creatinine


 0.7 mg/dL


(0.7-1.3)


 


Estimated GFR


(Cockcroft-Gault) 116.7 





 


Glucose Level


 134 mg/dL


(70-99)


 


Calcium Level


 8.5 mg/dL


(8.5-10.1)








Laboratory Tests








Test


 8/17/21


05:10


 


White Blood Count


 13.0 x10^3/uL


(4.0-11.0)


 


Red Blood Count


 4.56 x10^6/uL


(4.30-5.70)


 


Hemoglobin


 14.6 g/dL


(13.0-17.5)


 


Hematocrit


 42.6 %


(39.0-53.0)


 


Mean Corpuscular Volume 94 fL () 


 


Mean Corpuscular Hemoglobin 32 pg (25-35) 


 


Mean Corpuscular Hemoglobin


Concent 34 g/dL


(31-37)


 


Red Cell Distribution Width


 13.5 %


(11.5-14.5)


 


Platelet Count


 110 x10^3/uL


(140-400)


 


Sodium Level


 138 mmol/L


(136-145)


 


Potassium Level


 3.9 mmol/L


(3.5-5.1)


 


Chloride Level


 103 mmol/L


()


 


Carbon Dioxide Level


 29 mmol/L


(21-32)


 


Anion Gap 6 (6-14) 


 


Blood Urea Nitrogen


 23 mg/dL


(8-26)


 


Creatinine


 0.7 mg/dL


(0.7-1.3)


 


Estimated GFR


(Cockcroft-Gault) 116.7 





 


Glucose Level


 134 mg/dL


(70-99)


 


Calcium Level


 8.5 mg/dL


(8.5-10.1)








Medications





Current Medications


Acetaminophen (Tylenol) 650 mg PRN Q6HRS  PRN PO MILD PAIN / TEMP > 100.3'F Last

administered on 7/27/21at 20:09;  Start 7/27/21 at 19:00;  Stop 7/27/21 at 

20:15;  Status DC


Enoxaparin Sodium (Lovenox Per Pharmacy Prophylaxis Dosing) 1 each PRN DAILY  

PRN MC SEE COMMENTS;  Start 7/27/21 at 19:45


Enoxaparin Sodium (Lovenox 40mg Syringe) 40 mg Q24H SQ  Last administered on 

8/16/21at 22:41;  Start 7/27/21 at 20:00


Remdesivir 200 mg/ Sodium Chloride 210 ml @  210 mls/hr 1X  ONCE IV  Last 

administered on 7/27/21at 20:08;  Start 7/27/21 at 20:00;  Stop 7/27/21 at 

20:59;  Status DC


Remdesivir 100 mg/ Sodium Chloride 230 ml @  460 mls/hr Q24H IV  Last 

administered on 7/31/21at 21:20;  Start 7/28/21 at 20:00;  Stop 7/31/21 at 

20:29;  Status DC


Acetaminophen (Tylenol) 650 mg PRN Q6HRS  PRN PO MILD PAIN OR FEVER Last 

administered on 8/15/21at 21:38;  Start 7/27/21 at 19:45


Ascorbic Acid (Vitamin C) 1,000 mg DAILY PO  Last administered on 8/17/21at 

08:50;  Start 7/28/21 at 09:00


Cetirizine HCl (ZyrTEC) 10 mg DAILY PO  Last administered on 8/17/21at 08:50;  

Start 7/28/21 at 09:00


Vitamin D (Vitamin D3) 5,000 unit DAILY PO  Last administered on 8/17/21at 

08:50;  Start 7/28/21 at 09:00


Enoxaparin Sodium (Lovenox 30mg Syringe) 30 mg Q24H SQ ;  Start 7/27/21 at 21:

00;  Status Cancel


Famotidine (Pepcid) 20 mg HS PO  Last administered on 8/16/21at 22:42;  Start 

7/27/21 at 21:00


Non-Formulary Medication (Ipratropium/ Albuterol Sulfate (Combivent Respimat 

Inhal)) 2 inh QID IH ;  Start 7/27/21 at 21:00;  Status UNV


Methylprednisolone Sodium Succinate (SOLU-Medrol 125MG VIAL) 60 mg Q8HRS IV  

Last administered on 8/6/21at 05:41;  Start 7/27/21 at 22:00;  Stop 8/6/21 at 

10:13;  Status DC


Morphine Sulfate (Morphine Sulfate) 2 mg PRN Q2HR  PRN IVP MODERATE TO SEVERE 

PAIN Last administered on 8/11/21at 22:56;  Start 7/27/21 at 20:30


Zinc Sulfate (Orazinc) 220 mg DAILY PO  Last administered on 8/17/21at 08:50;  

Start 7/28/21 at 09:00


Albuterol/ Ipratropium (Duoneb) 3 ml RTQID NEB ;  Start 7/27/21 at 21:00;  

Status Cancel


Albuterol/ Ipratropium (Combivent Respimat  Mcg) 2 puff RTQID INH  Last 

administered on 8/16/21at 20:00;  Start 7/27/21 at 21:00


Alprazolam (Xanax) 0.25 mg PRN Q8HRS  PRN PO ANXIETY / AGITATION Last 

administered on 7/29/21at 08:59;  Start 7/28/21 at 15:15;  Stop 7/30/21 at 

14:14;  Status DC


Lorazepam (Ativan Inj) 1 mg PRN Q8HRS  PRN IVP ANXIETY / AGITATION Last 

administered on 7/29/21at 14:05;  Start 7/29/21 at 11:15;  Stop 7/30/21 at 

14:14;  Status DC


Thiamine Mononitrate (Vitamin B-1) 100 mg DAILY PO  Last administered on 

8/17/21at 08:50;  Start 7/30/21 at 09:00


Sterile Water (WATER for RESP) 1,000 ml CONT  PRN INH VIA VAPOTHERM DEVICE Last 

administered on 8/16/21at 01:35;  Start 7/29/21 at 19:15


Haloperidol Lactate (Haldol Inj) 5 mg PRN Q6HRS  PRN IVP AGITATION;  Start 

7/30/21 at 14:15


Insulin Human Lispro (HumaLOG) 0-7 UNITS TIDWMEALS SQ ;  Start 7/31/21 at 12:00;

 Stop 8/1/21 at 09:08;  Status DC


Dextrose (Dextrose 50%-Water Syringe) 12.5 gm PRN Q15MIN  PRN IV SEE COMMENTS;  

Start 7/31/21 at 10:15


Potassium Chloride/Dextrose/ Sod Cl 1,000 ml @  100 mls/hr Q10H IV  Last 

administered on 8/1/21at 16:54;  Start 7/31/21 at 10:15;  Stop 8/1/21 at 22:33; 

Status DC


Insulin Glargine (Lantus Syringe) 8 unit DAILY SQ ;  Start 8/1/21 at 09:00;  

Stop 8/1/21 at 08:50;  Status DC


Tocilizumab 800 mg/Sodium Chloride 100 ml @  100 mls/hr 1X  ONCE IV ;  Start 

8/2/21 at 09:00;  Stop 8/2/21 at 09:59;  Status Cancel


Methylprednisolone Sodium Succinate (SOLU-Medrol 40MG VIAL) 60 mg [id number] IV  Last

administered on 8/11/21at 05:45;  Start 8/6/21 at 18:00;  Stop 8/11/21 at 09:54;

 Status DC


Guaifenesin (Mucinex) 1,200 mg BID PO  Last administered on 8/17/21at 08:50;  

Start 8/8/21 at 21:00


Polyethylene Glycol (miraLAX PACKET) 17 gm PRN BID  PRN PO CONSTIPATION;  Start 

8/9/21 at 10:30


Methylprednisolone Sodium Succinate (SOLU-Medrol 125MG VIAL) 60 mg DAILY IV  

Last administered on 8/16/21at 08:53;  Start 8/12/21 at 09:00;  Stop 8/17/21 at 

08:34;  Status DC


Methylprednisolone Sodium Succinate (SOLU-Medrol 125MG VIAL) 40 mg DAILY IV  

Last administered on 8/17/21at 08:49;  Start 8/17/21 at 09:00





Active Scripts


Active


Reported


Morphine Sulfate 2 Mg/1 Ml Cartridge 2 Mg IV PRN Q4HRS PRN


Zinc Sulfate 50 Mg Tablet 220 Mg PO DAILY


Methylprednisolone Sod Succ 125 Mg Vial 60 Mg IJ Q8HRS


Combivent Respimat Inhal (Ipratropium/Albuterol Sulfate) 4 Gm Aer.w.adap 2 Inh 

IH QID


Pepcid (Famotidine) 20 Mg Tablet 20 Mg PO HS


Lovenox (Enoxaparin Sodium) 30 Mg/0.3 Ml Disp.syrin 30 Mg SQ DAILY


Vitamin D3 ** (Vitamin D) 125 Mcg Capsule 125 Mcg PO DAILY


     5,000 UNITS = 125 MCG


Zyrtec (Cetirizine Hcl) 10 Mg Tablet 1 Tab PO DAILY


Ascorbic Acid 500 Mg Tablet 2 Mg PO DAILY


Acetaminophen 325 Mg Tablet 2 Tab PO PRN Q6HRS PRN 30 Days


No Known Medications Prior To Admisstion (Info)  Each 1 Each 


Vitals/I & O





Vital Sign - Last 24 Hours








 8/16/21 8/16/21 8/16/21 8/16/21





 10:13 11:00 12:31 15:00


 


Temp  96.3  97.4





  96.3  97.4


 


Pulse  91  84


 


Resp  20  20


 


B/P (MAP)  131/67 (88)  133/81 (98)


 


Pulse Ox 96 94 94 95


 


O2 Delivery VAPOTHERM Nasal Cannula VAPOTHERM vapotherm


 


O2 Flow Rate 40.0 6.0 40.0 40.0


 


    





    





 8/16/21 8/16/21 8/16/21 8/16/21





 16:00 18:06 19:00 20:00


 


Temp   98.1 





   98.1 


 


Pulse   103 


 


Resp   18 


 


B/P (MAP)   132/79 (96) 


 


Pulse Ox 93 88 90 94


 


O2 Delivery VAPOTHERM VAPOTHERM vapotherm VAPOTHERM


 


O2 Flow Rate 40.0 40.0 40.0 40.0


 


    





    





 8/16/21 8/16/21 8/16/21 8/17/21





 20:00 23:00 23:30 03:40


 


Temp  97.6  





  97.6  


 


Pulse  93  


 


Resp  18  


 


B/P (MAP)  133/85 (101)  


 


Pulse Ox  96 92 95


 


O2 Delivery Vapotherm vapotherm VAPOTHERM VAPOTHERM


 


O2 Flow Rate 40.0 40.0 40.0 40.0


 


    





    





 8/17/21 8/17/21 8/17/21 8/17/21





 04:00 05:09 07:00 09:21


 


Temp 97.7  97.3 





 97.7  97.3 


 


Pulse 94  85 


 


Resp 20  18 


 


B/P (MAP) 141/87 (105)  131/81 (98) 


 


Pulse Ox 90 94 94 93


 


O2 Delivery vapotherm VAPOTHERM  VAPOTHERM


 


O2 Flow Rate 40.0 40.0 40.0 40.0














Intake and Output   


 


 8/16/21 8/16/21 8/17/21





 15:00 23:00 07:00


 


Intake Total 720 ml 460 ml 100 ml


 


Output Total 200 ml  600 ml


 


Balance 520 ml 460 ml -500 ml











Justicifation of Admission Dx:


Justifications for Admission:


Justification of Admission Dx:  Yes


Sepsis:  Hypoxemia











EMMA LORD MD          Aug 17, 2021 09:58

## 2021-08-17 NOTE — PDOC
PULMONARY PROGRESS NOTES


DATE: 8/17/21 


TIME: 08:28


Subjective


Patient remains on Vapotherm 40 L, 80%


afebrile 


anxious about getting better 


no overnight concerns


Vitals





Vital Signs








  Date Time  Temp Pulse Resp B/P (MAP) Pulse Ox O2 Delivery O2 Flow Rate FiO2


 


8/17/21 07:00 97.3 85 18 131/81 (98) 94  40.0 





 97.3       


 


8/17/21 05:09      VAPOTHERM  








Comments


Pt. seen during covid -19 pandemic visual exam preformed 


no distress 


RRR


Vapotherm 80%


no obvious rash or edema


Labs





Laboratory Tests








Test


 8/17/21


05:10


 


White Blood Count


 13.0 x10^3/uL


(4.0-11.0)


 


Red Blood Count


 4.56 x10^6/uL


(4.30-5.70)


 


Hemoglobin


 14.6 g/dL


(13.0-17.5)


 


Hematocrit


 42.6 %


(39.0-53.0)


 


Mean Corpuscular Volume 94 fL () 


 


Mean Corpuscular Hemoglobin 32 pg (25-35) 


 


Mean Corpuscular Hemoglobin


Concent 34 g/dL


(31-37)


 


Red Cell Distribution Width


 13.5 %


(11.5-14.5)


 


Platelet Count


 110 x10^3/uL


(140-400)


 


Sodium Level


 138 mmol/L


(136-145)


 


Potassium Level


 3.9 mmol/L


(3.5-5.1)


 


Chloride Level


 103 mmol/L


()


 


Carbon Dioxide Level


 29 mmol/L


(21-32)


 


Anion Gap 6 (6-14) 


 


Blood Urea Nitrogen


 23 mg/dL


(8-26)


 


Creatinine


 0.7 mg/dL


(0.7-1.3)


 


Estimated GFR


(Cockcroft-Gault) 116.7 





 


Glucose Level


 134 mg/dL


(70-99)


 


Calcium Level


 8.5 mg/dL


(8.5-10.1)








Laboratory Tests








Test


 8/17/21


05:10


 


White Blood Count


 13.0 x10^3/uL


(4.0-11.0)


 


Red Blood Count


 4.56 x10^6/uL


(4.30-5.70)


 


Hemoglobin


 14.6 g/dL


(13.0-17.5)


 


Hematocrit


 42.6 %


(39.0-53.0)


 


Mean Corpuscular Volume 94 fL () 


 


Mean Corpuscular Hemoglobin 32 pg (25-35) 


 


Mean Corpuscular Hemoglobin


Concent 34 g/dL


(31-37)


 


Red Cell Distribution Width


 13.5 %


(11.5-14.5)


 


Platelet Count


 110 x10^3/uL


(140-400)


 


Sodium Level


 138 mmol/L


(136-145)


 


Potassium Level


 3.9 mmol/L


(3.5-5.1)


 


Chloride Level


 103 mmol/L


()


 


Carbon Dioxide Level


 29 mmol/L


(21-32)


 


Anion Gap 6 (6-14) 


 


Blood Urea Nitrogen


 23 mg/dL


(8-26)


 


Creatinine


 0.7 mg/dL


(0.7-1.3)


 


Estimated GFR


(Cockcroft-Gault) 116.7 





 


Glucose Level


 134 mg/dL


(70-99)


 


Calcium Level


 8.5 mg/dL


(8.5-10.1)








Medications





Active Scripts








 Medications  Dose


 Route/Sig


 Max Daily Dose Days Date Category Dose


Instructions


 


 Morphine Sulfate


 2 Mg/1 Ml


 Cartridge  2 Mg


 IV PRN Q4HRS PRN


   7/27/21 Reported 


 


 Zinc Sulfate 50


 Mg Tablet  220 Mg


 PO DAILY


   7/27/21 Reported 


 


 Methylprednisolone


 Sod Succ 125 Mg


 Vial  60 Mg


 IJ Q8HRS


   7/27/21 Reported 


 


 Combivent


 Respimat Inhal


  (Ipratropium/Albuterol


 Sulfate) 4 Gm


 Aer.w.adap  2 Inh


 IH QID


   7/27/21 Reported 


 


 Pepcid


  (Famotidine) 20


 Mg Tablet  20 Mg


 PO HS


   7/27/21 Reported 


 


 Lovenox


  (Enoxaparin


 Sodium) 30 Mg/0.3


 Ml Disp.syrin  30 Mg


 SQ DAILY


   7/27/21 Reported 


 


 Vitamin D3 **


  (Vitamin D) 125


 Mcg Capsule  125 Mcg


 PO DAILY


   7/27/21 Reported  5,000 UNITS = 125 MCG


 


 Zyrtec


  (Cetirizine Hcl)


 10 Mg Tablet  1 Tab


 PO DAILY


   7/27/21 Reported 


 


 Ascorbic Acid 500


 Mg Tablet  2 Mg


 PO DAILY


   7/27/21 Reported 


 


 Acetaminophen 325


 Mg Tablet  2 Tab


 PO PRN Q6HRS PRN


  30 7/27/21 Reported 


 


 No Known


 Medications Prior


 To Admisstion


  (Info)  Each  1 Each


 


   8/25/20 Reported 











Impression


.


IMPRESSION:


1.  Acute hypoxemic respiratory failure secondary to COVID-19 viral 

pneumonia./Acute lung injury and early ARDS.  Slowly improving


2.  COVID-19 viral pneumonia.


3.  Abnormal chest x-ray, compatible with viral pneumonia.


4.  Nonspecific anxiety, possible clinical depression





Plan


.


Updated 8/17/21


Continue supplemental oxygen to keep sats above 92% currently on Vapotherm, 

currently on 80% and 40 liters will slowly wean as tolerated, slowly improving


reduce Fi02 to 75% today 


IS at bedside 


Continue steroids with taper, will reduce dose today


S/P remdesivir/Tocilizumab  


PT/OT


DVT/GI PPX :lovenox 


D/W RN and RT


Socailw ork for DC planning











ASHLEY YEAGER MD                 Aug 17, 2021 08:34

## 2021-08-18 VITALS — SYSTOLIC BLOOD PRESSURE: 127 MMHG | DIASTOLIC BLOOD PRESSURE: 71 MMHG

## 2021-08-18 VITALS — SYSTOLIC BLOOD PRESSURE: 119 MMHG | DIASTOLIC BLOOD PRESSURE: 68 MMHG

## 2021-08-18 VITALS — SYSTOLIC BLOOD PRESSURE: 120 MMHG | DIASTOLIC BLOOD PRESSURE: 71 MMHG

## 2021-08-18 VITALS — SYSTOLIC BLOOD PRESSURE: 113 MMHG | DIASTOLIC BLOOD PRESSURE: 65 MMHG

## 2021-08-18 VITALS — DIASTOLIC BLOOD PRESSURE: 74 MMHG | SYSTOLIC BLOOD PRESSURE: 120 MMHG

## 2021-08-18 VITALS — DIASTOLIC BLOOD PRESSURE: 83 MMHG | SYSTOLIC BLOOD PRESSURE: 145 MMHG

## 2021-08-18 RX ADMIN — ENOXAPARIN SODIUM SCH MG: 40 INJECTION SUBCUTANEOUS at 20:52

## 2021-08-18 RX ADMIN — CETIRIZINE HYDROCHLORIDE SCH MG: 10 TABLET, FILM COATED ORAL at 08:54

## 2021-08-18 RX ADMIN — IPRATROPIUM BROMIDE AND ALBUTEROL SCH PUFF: 20; 100 SPRAY, METERED RESPIRATORY (INHALATION) at 16:00

## 2021-08-18 RX ADMIN — Medication SCH MG: at 08:52

## 2021-08-18 RX ADMIN — WATER PRN ML: 1 INJECTION INTRAVENOUS at 00:43

## 2021-08-18 RX ADMIN — IPRATROPIUM BROMIDE AND ALBUTEROL SCH PUFF: 20; 100 SPRAY, METERED RESPIRATORY (INHALATION) at 08:56

## 2021-08-18 RX ADMIN — ZINC SULFATE CAP 220 MG (50 MG ELEMENTAL ZN) SCH MG: 220 (50 ZN) CAP at 08:52

## 2021-08-18 RX ADMIN — IPRATROPIUM BROMIDE AND ALBUTEROL SCH PUFF: 20; 100 SPRAY, METERED RESPIRATORY (INHALATION) at 20:52

## 2021-08-18 RX ADMIN — IPRATROPIUM BROMIDE AND ALBUTEROL SCH PUFF: 20; 100 SPRAY, METERED RESPIRATORY (INHALATION) at 12:58

## 2021-08-18 RX ADMIN — WATER PRN ML: 1 INJECTION INTRAVENOUS at 13:00

## 2021-08-18 RX ADMIN — CHOLECALCIFEROL CAP 125 MCG (5000 UNIT) SCH UNIT: 125 CAP at 08:54

## 2021-08-18 RX ADMIN — METHYLPREDNISOLONE SODIUM SUCCINATE SCH MG: 125 INJECTION, POWDER, FOR SOLUTION INTRAMUSCULAR; INTRAVENOUS at 08:55

## 2021-08-18 RX ADMIN — FAMOTIDINE SCH MG: 20 TABLET ORAL at 20:52

## 2021-08-18 NOTE — RAD
Single view of the chest. 8/18/2021 11:16 AM



Indication: Reason: PNEUMONIA / Spl. Instructions:  / History: 



Comparison: Chest radiograph August 11, 2021



Findings: No pneumothorax. No significant effusion. Severe bilateral interstitial and alveolar infilt
rates, overall similar to comparison study. Low lung volumes noted. Allowing for this heart size is n
ormal. No acute osseous changes noted in the interim.



IMPRESSION: Severe bilateral infiltrates, overall similar to comparison study.



Electronically signed by: Myke Hou MD (8/18/2021 12:14 PM) [id number]

## 2021-08-18 NOTE — PDOC
PULMONARY PROGRESS NOTES


DATE: 8/18/21 


TIME: 08:04


Subjective


Patient remains on Vapotherm 40 L, 75%


afebrile 


anxious about getting better 


no overnight concerns


Vitals





Vital Signs








  Date Time  Temp Pulse Resp B/P (MAP) Pulse Ox O2 Delivery O2 Flow Rate FiO2


 


8/18/21 06:56 98.0 84 18 120/71 (87) 92 vapotherm 40.0 





 98.0       








Comments


Pt. seen during covid -19 pandemic visual exam preformed 


no distress 


RRR


Vapotherm 80%


no obvious rash or edema


Labs





Laboratory Tests








Test


 8/17/21


05:10


 


White Blood Count


 13.0 x10^3/uL


(4.0-11.0)


 


Red Blood Count


 4.56 x10^6/uL


(4.30-5.70)


 


Hemoglobin


 14.6 g/dL


(13.0-17.5)


 


Hematocrit


 42.6 %


(39.0-53.0)


 


Mean Corpuscular Volume 94 fL () 


 


Mean Corpuscular Hemoglobin 32 pg (25-35) 


 


Mean Corpuscular Hemoglobin


Concent 34 g/dL


(31-37)


 


Red Cell Distribution Width


 13.5 %


(11.5-14.5)


 


Platelet Count


 110 x10^3/uL


(140-400)


 


Sodium Level


 138 mmol/L


(136-145)


 


Potassium Level


 3.9 mmol/L


(3.5-5.1)


 


Chloride Level


 103 mmol/L


()


 


Carbon Dioxide Level


 29 mmol/L


(21-32)


 


Anion Gap 6 (6-14) 


 


Blood Urea Nitrogen


 23 mg/dL


(8-26)


 


Creatinine


 0.7 mg/dL


(0.7-1.3)


 


Estimated GFR


(Cockcroft-Gault) 116.7 





 


Glucose Level


 134 mg/dL


(70-99)


 


Calcium Level


 8.5 mg/dL


(8.5-10.1)








Medications





Active Scripts








 Medications  Dose


 Route/Sig


 Max Daily Dose Days Date Category Dose


Instructions


 


 Morphine Sulfate


 2 Mg/1 Ml


 Cartridge  2 Mg


 IV PRN Q4HRS PRN


   7/27/21 Reported 


 


 Zinc Sulfate 50


 Mg Tablet  220 Mg


 PO DAILY


   7/27/21 Reported 


 


 Methylprednisolone


 Sod Succ 125 Mg


 Vial  60 Mg


 IJ Q8HRS


   7/27/21 Reported 


 


 Combivent


 Respimat Inhal


  (Ipratropium/Albuterol


 Sulfate) 4 Gm


 Aer.w.adap  2 Inh


 IH QID


   7/27/21 Reported 


 


 Pepcid


  (Famotidine) 20


 Mg Tablet  20 Mg


 PO HS


   7/27/21 Reported 


 


 Lovenox


  (Enoxaparin


 Sodium) 30 Mg/0.3


 Ml Disp.syrin  30 Mg


 SQ DAILY


   7/27/21 Reported 


 


 Vitamin D3 **


  (Vitamin D) 125


 Mcg Capsule  125 Mcg


 PO DAILY


   7/27/21 Reported  5,000 UNITS = 125 MCG


 


 Zyrtec


  (Cetirizine Hcl)


 10 Mg Tablet  1 Tab


 PO DAILY


   7/27/21 Reported 


 


 Ascorbic Acid 500


 Mg Tablet  2 Mg


 PO DAILY


   7/27/21 Reported 


 


 Acetaminophen 325


 Mg Tablet  2 Tab


 PO PRN Q6HRS PRN


  30 7/27/21 Reported 


 


 No Known


 Medications Prior


 To Admisstion


  (Info)  Each  1 Each


 


   8/25/20 Reported 











Impression


.


IMPRESSION:


1.  Acute hypoxemic respiratory failure secondary to COVID-19 viral 

pneumonia./Acute lung injury and early ARDS.  Slowly improving


2.  COVID-19 viral pneumonia.


3.  Abnormal chest x-ray, compatible with viral pneumonia.


4.  Nonspecific anxiety, possible clinical depression





Plan


.


Updated 8/18/21


Continue supplemental oxygen to keep sats above 92% currently on Vapotherm, 

currently on 75% and 40 liters will slowly wean as tolerated, slowly improving


reduce Fi02 to 75% today 


IS at bedside 


Continue steroids with taper, will reduce dose today and change to PO in am


S/P remdesivir/Tocilizumab  


PT/OT


DVT/GI PPX :lovenox 


D/W RN and RT


Socailw ork for DC planning











ASHLEY YEAGER MD                 Aug 18, 2021 08:05

## 2021-08-18 NOTE — PDOC
PROGRESS NOTES


Date of Service:


DATE: 21 


TIME: 08:56





Chief Complaint


Chief Complaint


SEPSIS, was POA


Severe malnutrition was POA, with obesity, BMI 32


Acute hypoxic respiratory failure


COVID-19 pneumonia r


transaminitis





History of Present Illness


History of Present Illness


Mr Addison is a 56-year-old male with no significant past medical history except 

for degenerative joint disease who was transferred from [facility]  

with complaints of fever, headache, dry cough, shortness of breath for the past 

3 days, admitted on 2021.  Patient stated that he was actually exposed from

his son he was worsening and required to come to the ED at [facility] and he was

found to be hypoxic and required 2 L of nasal cannula oxygen.  Chest x-ray over 

there showed bilateral mild bibasilar infiltrates.  Covid test was positive and 

patient was transferred to [facility] for pulmonary evaluation and treatment.  Patient 

was started on Remdesivir and IV steroids.  Currently patient is requiring 15 L 

high flow nasal cannula and IV steroids.





: No acute events overnight.  Patient was saturating 92% on 15 L nasal 

cannula and he had desatted down to 70% after he had to blow his nose.  Patient 

was placed on BiPAP at 100% FiO2.  Chest leg appears to be worsening.


: Last dose of remdesivir. Could not tolerate vapotherm and is now on BIPAP.


:  poor Po intake, dry,   dark urine


: Desaturates to 72% when he takes off his BiPAP.  Advised to get back on 

BIPAP after eating. he is able to place himself back on. Frustrated with the 

severity of his hypoxia.


8/3: Status post remdesivir. Still requiring BiPAP 75% FiO2 with significant 

desaturations when he is not wearing assessment with nonrebreather O2.


: Still with significant desaturations when he raises BiPAP even on 15 L high

flow nasal cannula.  Discussed transitioning back to Vapotherm if we can treat 

his anxiety.  He is amenable to this.  He is frustrated with staying in the 

hospital understands he may be a longer term COVID recovery and is amenable to 

LTACH referral.  I discussed with pulmonology.


: Afebrile. 70% FiO2 on BiPAP .  Feels better.  Did not tolerated 

Vapotherm.  Significant desaturations when he transitions to high flow nasal 

cannula O2 while eating.  Discussed referral to LTACH with pulmonology and ID as

it appears he will need a longer COVID recovery.


: Patient on 70% FiO2.  Via BiPAP  when seen by pulmonology. Feels better

and is seen on vapotherm 40l/min 100% FiO2 with saturations 89%, tolerating 

well.





2021


patient seen at edge of bed. FiO2 at 89%. Dropped to low 80s while talking to 

us. Educated patient on conserving energy and working to keep FiO2 higher.


D/W RN


Chart Reviewed





2021


Patient was seen and examined in bed. FiO2 of 87% while on Vapotherm with 40L of

90% O2.


D/W RN, Chart Review


Patient has not had a bowel movement in two days. 


Patient feels he is doing better but his FiO2 still drops with activity or 

speaking


Discussed need to rest and focus on breathing to maintain proper FiO2.





8/10/2021


Patient was resting in bed with NAD


Seen and examined in bed. FiO2 of 92% while on Vapotherm with 40L of 90% O2.


D/W RN


Chart Review


Discussed need to rest and focus on breathing to maintain proper FiO2





2021


Patient was awake, alert and NAD


Seen and examine in bed.


FiO2 92% while on Vapotherm with 40L of 90% O2


Discussed that we will continue to monitor for clinical improvement





2021


Patient resting upon entry into room. Upon exam in bed he was awake, alert and 

NAD


FiO2 90% while on Vapotherm with 40L of 90% O2


Discussed continued monitoring and that he is making slow improvements.


Patient was encouraged by improvements and eager to return to as high of 

function as possible.





2021


Patient awake, alert and on computer.


FiO2 92% while on Vapotherm with 40L of 90% O2


Continuing to make slow improvements


Discussed continuing treatment plan at this time





2021


Patient resting upon entry into room. Upon exam in bed he was awake, alert and 

NAD


FiO2 90% while on Vapotherm with 40L of 85% O2


We will continue to monitor as he is making slow improvements


Discharge plan will be created as he continues with current treatment





8/15/2021


Patient in prone position upon entry into room. States that he placed himself in

this position after he had read that it would help online. 


Upon exam in bed he was awake, alert and NAD


FiO2 90% while on Vapotherm with 40L of 85% O2


D/W RN, Chart review


We will continue to monitor and discharge disposition in process via social work








2021


Patient in prone position upon entry into room. States that he placed himself in

this position after he had read that it would help online. 


Upon exam in bed he was awake, alert and NAD


FiO2 90% while on Vapotherm with 40L of 85% O2


D/W RN, Chart review


We will continue to monitor and discharge disposition in process via social work


Patient remains on Vapotherm 40 L, 80%


afebrile 








2021


Patient in prone position upon entry into room. States that he placed himself in

this position after he had read that it would help online. 


Upon exam in bed he was awake, alert and NAD


FiO2 90% while on Vapotherm with 40L of 85% O2


D/W RN, Chart review


We will continue to monitor and discharge disposition in process via social work


Patient remains on Vapotherm 40 L, 80%


afebrile 


remdesivir/Tocilizumab  


PT/OT


DVT/GI PPX :lovenox 











2021





Upon exam in bed he was awake, alert and NAD


on Vapotherm with 40L of 94% O2


D/W RN, Chart review


We will continue to monitor and discharge disposition in process via social work


SYMPTOMS SLOW TO RESOLVE


afebrile 


remdesivir/Tocilizumab  


PT/OT


DVT/GI PPX :lovenox 


CXR 





Vitals


Vitals





Vital Signs








  Date Time  Temp Pulse Resp B/P (MAP) Pulse Ox O2 Delivery O2 Flow Rate FiO2


 


21 08:16     94 VAPOTHERM 40.0 


 


21 06:56 98.0 84 18 120/71 (87)    





 98.0       











Physical Exam


Physical Exam








GEN axo3 male on BIPAP


HEENT:  Normocephalic, atraumatic.  Anicteric. 


NECK:  Supple, no JVD.


LUNGS:  Crackles bilaterally.   


HEART:  S1, S2.  


ABDOMEN:  Soft, nontender, nondistended.  Bowel sounds present.


EXTREMITIES:  No edema, no cyanosis.


DERMATOLOGIC:  Warm, dry, no generalized rash.


NEUROLOGIC:  Alert, oriented, grossly nonfocal.


PSYCHIATRIC:  Calm and cooperative. 


 PIV looks clean.


General:  Alert, Oriented X3, Cooperative, mild distress


Heart:  Regular rate (tele reg,  reviewed)


Lungs:  Clear


Abdomen:  Normal bowel sounds, Soft


Extremities:  No clubbing


Skin:  No rashes





Labs


LABS





PATIENT: TYE ADDISON PACCOUNT: [id number]     MRN#: [id number]


: 1965           LOCATION:  [facility]         AGE: 56


SEX: M                    EXAM DT: 21         ACCESSION#: [id number]


STATUS: ADM IN            ORD. PHYSICIAN: EMMA LORD MD


REASON: PNEUMONIA


PROCEDURE: CHEST AP ONLY








Single view of the chest. 2021 11:16 AM





Indication: Reason: PNEUMONIA / Spl. Instructions:  / History: 





Comparison: Chest radiograph 2021





Findings: No pneumothorax. No significant effusion. Severe bilateral 

interstitial and alveolar infiltrates, overall similar to comparison study. Low 

lung volumes noted. Allowing for this heart size is normal. No acute osseous 

changes noted in the interim.





IMPRESSION: Severe bilateral infiltrates, overall similar to comparison study.





Electronically signed by: Myke Joseph MD (2021 12:14 PM) [id number]














DICTATED and SIGNED BY:     MYKE JOSEPH MD


DATE:     21 [id number] 0


Exam: Chest one view





INDICATION: Chest pain





TECHNIQUE: Frontal view of the chest





Comparisons: 2021





FINDINGS:


The cardiomediastinal silhouette and pulmonary vessels are within normal limits.





Patchy bilateral airspace disease. No pleural effusion.





IMPRESSION:


Patchy bilateral airspace disease, similar to mildly increased when compared to 

prior study.





Electronically signed by: Radha Moreira MD (2021 10:21 PM) [id number]














DICTATED and SIGNED BY:     RADHA MOREIRA MD


DATE:     21MTH0 0





Comment


Review of Relevant


I have reviewed the following items genesis (where applicable) has been applied.


Labs





Laboratory Tests








Test


 21


05:10


 


White Blood Count


 13.0 x10^3/uL


(4.0-11.0)


 


Red Blood Count


 4.56 x10^6/uL


(4.30-5.70)


 


Hemoglobin


 14.6 g/dL


(13.0-17.5)


 


Hematocrit


 42.6 %


(39.0-53.0)


 


Mean Corpuscular Volume 94 fL () 


 


Mean Corpuscular Hemoglobin 32 pg (25-35) 


 


Mean Corpuscular Hemoglobin


Concent 34 g/dL


(31-37)


 


Red Cell Distribution Width


 13.5 %


(11.5-14.5)


 


Platelet Count


 110 x10^3/uL


(140-400)


 


Sodium Level


 138 mmol/L


(136-145)


 


Potassium Level


 3.9 mmol/L


(3.5-5.1)


 


Chloride Level


 103 mmol/L


()


 


Carbon Dioxide Level


 29 mmol/L


(21-32)


 


Anion Gap 6 (6-14) 


 


Blood Urea Nitrogen


 23 mg/dL


(8-26)


 


Creatinine


 0.7 mg/dL


(0.7-1.3)


 


Estimated GFR


(Cockcroft-Gault) 116.7 





 


Glucose Level


 134 mg/dL


(70-99)


 


Calcium Level


 8.5 mg/dL


(8.5-10.1)








Medications





Current Medications


Acetaminophen (Tylenol) 650 mg PRN Q6HRS  PRN PO MILD PAIN / TEMP > 100.3'F Last

administered on 21at 20:09;  Start 21 at 19:00;  Stop 21 at 

20:15;  Status DC


Enoxaparin Sodium (Lovenox Per Pharmacy Prophylaxis Dosing) 1 each PRN DAILY  

PRN MC SEE COMMENTS;  Start 21 at 19:45


Enoxaparin Sodium (Lovenox 40mg Syringe) 40 mg Q24H SQ  Last administered on 

21at 21:32;  Start 21 at 20:00


Remdesivir 200 mg/ Sodium Chloride 210 ml @  210 mls/hr 1X  ONCE IV  Last 

administered on 21at 20:08;  Start 21 at 20:00;  Stop 21 at 

20:59;  Status DC


Remdesivir 100 mg/ Sodium Chloride 230 ml @  460 mls/hr Q24H IV  Last 

administered on 21at 21:20;  Start 21 at 20:00;  Stop 21 at 

20:29;  Status DC


Acetaminophen (Tylenol) 650 mg PRN Q6HRS  PRN PO MILD PAIN OR FEVER Last 

administered on 8/15/21at 21:38;  Start 21 at 19:45


Ascorbic Acid (Vitamin C) 1,000 mg DAILY PO  Last administered on 21at 

08:52;  Start 21 at 09:00


Cetirizine HCl (ZyrTEC) 10 mg DAILY PO  Last administered on  08:54;  

Start 21 at 09:00


Vitamin D (Vitamin D3) 5,000 unit DAILY PO  Last administered on  

08:54;  Start 21 at 09:00


Enoxaparin Sodium (Lovenox 30mg Syringe) 30 mg Q24H SQ ;  Start 21 at 

21:00;  Status Cancel


Famotidine (Pepcid) 20 mg HS PO  Last administered on 21at 21:32;  Start 

21 at 21:00


Non-Formulary Medication (Ipratropium/ Albuterol Sulfate (Combivent Respimat 

Inhal)) 2 inh QID IH ;  Start 21 at 21:00;  Status UNV


Methylprednisolone Sodium Succinate (SOLU-Medrol 125MG VIAL) 60 mg Q8HRS IV  

Last administered on 21at 05:41;  Start 21 at 22:00;  Stop 21 at 

10:13;  Status DC


Morphine Sulfate (Morphine Sulfate) 2 mg PRN Q2HR  PRN IVP MODERATE TO SEVERE 

PAIN Last administered on 21at 22:56;  Start 21 at 20:30


Zinc Sulfate (Orazinc) 220 mg DAILY PO  Last administered on 21at 08:52;  

Start 21 at 09:00


Albuterol/ Ipratropium (Duoneb) 3 ml RTQID NEB ;  Start 21 at 21:00;  

Status Cancel


Albuterol/ Ipratropium (Combivent Respimat  Mcg) 2 puff RTQID INH  Last 

administered on 21at 21:32;  Start 21 at 21:00


Alprazolam (Xanax) 0.25 mg PRN Q8HRS  PRN PO ANXIETY / AGITATION Last 

administered on 21at 08:59;  Start 21 at 15:15;  Stop 21 at 

14:14;  Status DC


Lorazepam (Ativan Inj) 1 mg PRN Q8HRS  PRN IVP ANXIETY / AGITATION Last 

administered on 21at 14:05;  Start 21 at 11:15;  Stop 21 at 

14:14;  Status DC


Thiamine Mononitrate (Vitamin B-1) 100 mg DAILY PO  Last administered on 

21at 08:52;  Start 21 at 09:00


Sterile Water (WATER for RESP) 1,000 ml CONT  PRN INH VIA VAPOTHERM DEVICE Last 

administered on 21at 00:43;  Start 21 at 19:15


Haloperidol Lactate (Haldol Inj) 5 mg PRN Q6HRS  PRN IVP AGITATION;  Start 

21 at 14:15


Insulin Human Lispro (HumaLOG) 0-7 UNITS TIDWMEALS SQ ;  Start 21 at 12:00;

 Stop 21 at 09:08;  Status DC


Dextrose (Dextrose 50%-Water Syringe) 12.5 gm PRN Q15MIN  PRN IV SEE COMMENTS;  

Start 21 at 10:15


Potassium Chloride/Dextrose/ Sod Cl 1,000 ml @  100 mls/hr Q10H IV  Last 

administered on 21at 16:54;  Start 21 at 10:15;  Stop 21 at 22:33; 

Status DC


Insulin Glargine (Lantus Syringe) 8 unit DAILY SQ ;  Start 21 at 09:00;  

Stop 21 at 08:50;  Status DC


Tocilizumab 800 mg/Sodium Chloride 100 ml @  100 mls/hr 1X  ONCE IV ;  Start 

21 at 09:00;  Stop 21 at 09:59;  Status Cancel


Methylprednisolone Sodium Succinate (SOLU-Medrol 40MG VIAL) 60 mg BID66 IV  Last

administered on 21at 05:45;  Start 21 at 18:00;  Stop 21 at 09:54;

 Status DC


Guaifenesin (Mucinex) 1,200 mg BID PO  Last administered on 21at 08:53;  

Start 21 at 21:00


Polyethylene Glycol (miraLAX PACKET) 17 gm PRN BID  PRN PO CONSTIPATION;  Start 

21 at 10:30


Methylprednisolone Sodium Succinate (SOLU-Medrol 125MG VIAL) 60 mg DAILY IV  

Last administered on 21at 08:53;  Start 21 at 09:00;  Stop 21 at 

08:34;  Status DC


Methylprednisolone Sodium Succinate (SOLU-Medrol 125MG VIAL) 40 mg DAILY IV  

Last administered on 21at 08:55;  Start 21 at 09:00





Active Scripts


Active


Reported


Morphine Sulfate 2 Mg/1 Ml Cartridge 2 Mg IV PRN Q4HRS PRN


Zinc Sulfate 50 Mg Tablet 220 Mg PO DAILY


Methylprednisolone Sod Succ 125 Mg Vial 60 Mg IJ Q8HRS


Combivent Respimat Inhal (Ipratropium/Albuterol Sulfate) 4 Gm Aer.w.adap 2 Inh 

IH QID


Pepcid (Famotidine) 20 Mg Tablet 20 Mg PO HS


Lovenox (Enoxaparin Sodium) 30 Mg/0.3 Ml Disp.syrin 30 Mg SQ DAILY


Vitamin D3 ** (Vitamin D) 125 Mcg Capsule 125 Mcg PO DAILY


     5,000 UNITS = 125 MCG


Zyrtec (Cetirizine Hcl) 10 Mg Tablet 1 Tab PO DAILY


Ascorbic Acid 500 Mg Tablet 2 Mg PO DAILY


Acetaminophen 325 Mg Tablet 2 Tab PO PRN Q6HRS PRN 30 Days


No Known Medications Prior To Admisstion (Info)  Each 1 Each 


Vitals/I & O





Vital Sign - Last 24 Hours








 21





 09:21 11:00 12:30 15:00


 


Temp  97.1  96.8





  97.1  96.8


 


Pulse  90  83


 


Resp  22  20


 


B/P (MAP)  135/81 (99)  134/89 (104)


 


Pulse Ox 93 95 95 97


 


O2 Delivery VAPOTHERM vapotherm 45/ 75 VAPOTHERM VAPOTHERM 40/75


 


O2 Flow Rate 40.0  40.0 


 


    





    





 21





 17:15 20:00 20:33 23:00


 


Temp    97.7





    97.7


 


Pulse    84


 


Resp    20


 


B/P (MAP)    130/80 (97)


 


Pulse Ox 95  96 96


 


O2 Delivery VAPOTHERM Vapotherm VAPOTHERM VAPOTHERM 40/75


 


O2 Flow Rate 40.0 40.0 40.0 


 


    





    





 21





 23:28 02:25 04:30 05:16


 


Temp   97.5 





   97.5 


 


Pulse   80 


 


Resp   18 


 


B/P (MAP)   113/65 (81) 


 


Pulse Ox 97 97 95 96


 


O2 Delivery VAPOTHERM VAPOTHERM vapotherm VAPOTHERM


 


O2 Flow Rate 40.0 40.0 40.0 40.0


 


    





    





 21  





 06:56 08:16  


 


Temp 98.0   





 98.0   


 


Pulse 84   


 


Resp 18   


 


B/P (MAP) 120/71 (87)   


 


Pulse Ox 92 94  


 


O2 Delivery vapotherm VAPOTHERM  


 


O2 Flow Rate 40.0 40.0  














Intake and Output   


 


 21





 15:00 23:00 07:00


 


Intake Total 980 ml 210 ml 


 


Output Total 400 ml 1800 ml 1000 ml


 


Balance 580 ml -1590 ml -1000 ml











Justicifation of Admission Dx:


Justifications for Admission:


Justification of Admission Dx:  Yes


Sepsis:  Hypoxemia











EMMA LORD MD          Aug 18, 2021 08:58

## 2021-08-19 VITALS — DIASTOLIC BLOOD PRESSURE: 77 MMHG | SYSTOLIC BLOOD PRESSURE: 124 MMHG

## 2021-08-19 VITALS — SYSTOLIC BLOOD PRESSURE: 88 MMHG | DIASTOLIC BLOOD PRESSURE: 47 MMHG

## 2021-08-19 VITALS — SYSTOLIC BLOOD PRESSURE: 111 MMHG | DIASTOLIC BLOOD PRESSURE: 64 MMHG

## 2021-08-19 LAB
ANION GAP SERPL CALC-SCNC: 9 MMOL/L (ref 6–14)
BUN SERPL-MCNC: 24 MG/DL (ref 8–26)
CALCIUM SERPL-MCNC: 8.8 MG/DL (ref 8.5–10.1)
CHLORIDE SERPL-SCNC: 104 MMOL/L (ref 98–107)
CO2 SERPL-SCNC: 28 MMOL/L (ref 21–32)
CREAT SERPL-MCNC: 0.9 MG/DL (ref 0.7–1.3)
CRP SERPL-MCNC: 2.1 MG/L (ref 0–3.3)
GFR SERPLBLD BASED ON 1.73 SQ M-ARVRAT: 87.3 ML/MIN
GLUCOSE SERPL-MCNC: 152 MG/DL (ref 70–99)
POTASSIUM SERPL-SCNC: 3.8 MMOL/L (ref 3.5–5.1)
SODIUM SERPL-SCNC: 141 MMOL/L (ref 136–145)

## 2021-08-19 RX ADMIN — CETIRIZINE HYDROCHLORIDE SCH MG: 10 TABLET, FILM COATED ORAL at 09:45

## 2021-08-19 RX ADMIN — IPRATROPIUM BROMIDE AND ALBUTEROL SCH PUFF: 20; 100 SPRAY, METERED RESPIRATORY (INHALATION) at 21:06

## 2021-08-19 RX ADMIN — ZINC SULFATE CAP 220 MG (50 MG ELEMENTAL ZN) SCH MG: 220 (50 ZN) CAP at 09:45

## 2021-08-19 RX ADMIN — IPRATROPIUM BROMIDE AND ALBUTEROL SCH PUFF: 20; 100 SPRAY, METERED RESPIRATORY (INHALATION) at 16:00

## 2021-08-19 RX ADMIN — FAMOTIDINE SCH MG: 20 TABLET ORAL at 21:07

## 2021-08-19 RX ADMIN — Medication SCH MG: at 09:45

## 2021-08-19 RX ADMIN — WATER PRN ML: 1 INJECTION INTRAVENOUS at 00:14

## 2021-08-19 RX ADMIN — CHOLECALCIFEROL CAP 125 MCG (5000 UNIT) SCH UNIT: 125 CAP at 09:45

## 2021-08-19 RX ADMIN — ENOXAPARIN SODIUM SCH MG: 40 INJECTION SUBCUTANEOUS at 21:07

## 2021-08-19 RX ADMIN — IPRATROPIUM BROMIDE AND ALBUTEROL SCH PUFF: 20; 100 SPRAY, METERED RESPIRATORY (INHALATION) at 09:46

## 2021-08-19 RX ADMIN — IPRATROPIUM BROMIDE AND ALBUTEROL SCH PUFF: 20; 100 SPRAY, METERED RESPIRATORY (INHALATION) at 12:00

## 2021-08-19 RX ADMIN — WATER PRN ML: 1 INJECTION INTRAVENOUS at 13:04

## 2021-08-19 NOTE — PDOC
PROGRESS NOTES


Date of Service:


DATE: 8/19/21 


TIME: 09:33





Chief Complaint


Chief Complaint


SEPSIS, was POA


Severe malnutrition was POA, with obesity, BMI 32


Acute hypoxic respiratory failure


COVID-19 pneumonia r


transaminitis





History of Present Illness


History of Present Illness


Mr Read is a 56-year-old male with no significant past medical history except 

for degenerative joint disease who was transferred from [facility]  

with complaints of fever, headache, dry cough, shortness of breath for the past 

3 days, admitted on 7/26/2021.  Patient stated that he was actually exposed from

his son he was worsening and required to come to the ED at [facility] and he was

found to be hypoxic and required 2 L of nasal cannula oxygen.  Chest x-ray over 

there showed bilateral mild bibasilar infiltrates.  Covid test was positive and 

patient was transferred to [facility] for pulmonary evaluation and treatment.  Patient 

was started on Remdesivir and IV steroids.  Currently patient is requiring 15 L 

high flow nasal cannula and IV steroids.





7/30: No acute events overnight.  Patient was saturating 92% on 15 L nasal 

cannula and he had desatted down to 70% after he had to blow his nose.  Patient 

was placed on BiPAP at 100% FiO2.  Chest leg appears to be worsening.


7/31: Last dose of remdesivir. Could not tolerate vapotherm and is now on BIPAP.


8/1:  poor Po intake, dry,   dark urine


8/2: Desaturates to 72% when he takes off his BiPAP.  Advised to get back on 

BIPAP after eating. he is able to place himself back on. Frustrated with the 

severity of his hypoxia.


8/3: Status post remdesivir. Still requiring BiPAP 75% FiO2 with significant 

desaturations when he is not wearing assessment with nonrebreather O2.


8/4: Still with significant desaturations when he raises BiPAP even on 15 L high

flow nasal cannula.  Discussed transitioning back to Vapotherm if we can treat 

his anxiety.  He is amenable to this.  He is frustrated with staying in the 

hospital understands he may be a longer term COVID recovery and is amenable to 

LTACH referral.  I discussed with pulmonology.


8/5: Afebrile. 70% FiO2 on BiPAP 12/6.  Feels better.  Did not tolerated 

Vapotherm.  Significant desaturations when he transitions to high flow nasal 

cannula O2 while eating.  Discussed referral to LTACH with pulmonology and ID as

it appears he will need a longer COVID recovery.


8/6: Patient on 70% FiO2.  Via BiPAP 12/6 when seen by pulmonology. Feels better

and is seen on vapotherm 40l/min 100% FiO2 with saturations 89%, tolerating 

well.





8/8/2021


patient seen at edge of bed. FiO2 at 89%. Dropped to low 80s while talking to 

us. Educated patient on conserving energy and working to keep FiO2 higher.


D/W RN


Chart Reviewed





8/9/2021


Patient was seen and examined in bed. FiO2 of 87% while on Vapotherm with 40L of

90% O2.


D/W RN, Chart Review


Patient has not had a bowel movement in two days. 


Patient feels he is doing better but his FiO2 still drops with activity or 

speaking


Discussed need to rest and focus on breathing to maintain proper FiO2.





8/10/2021


Patient was resting in bed with NAD


Seen and examined in bed. FiO2 of 92% while on Vapotherm with 40L of 90% O2.


D/W RN


Chart Review


Discussed need to rest and focus on breathing to maintain proper FiO2





8/11/2021


Patient was awake, alert and NAD


Seen and examine in bed.


FiO2 92% while on Vapotherm with 40L of 90% O2


Discussed that we will continue to monitor for clinical improvement





8/12/2021


Patient resting upon entry into room. Upon exam in bed he was awake, alert and 

NAD


FiO2 90% while on Vapotherm with 40L of 90% O2


Discussed continued monitoring and that he is making slow improvements.


Patient was encouraged by improvements and eager to return to as high of 

function as possible.





8/13/2021


Patient awake, alert and on computer.


FiO2 92% while on Vapotherm with 40L of 90% O2


Continuing to make slow improvements


Discussed continuing treatment plan at this time





8/14/2021


Patient resting upon entry into room. Upon exam in bed he was awake, alert and 

NAD


FiO2 90% while on Vapotherm with 40L of 85% O2


We will continue to monitor as he is making slow improvements


Discharge plan will be created as he continues with current treatment





8/15/2021


Patient in prone position upon entry into room. States that he placed himself in

this position after he had read that it would help online. 


Upon exam in bed he was awake, alert and NAD


FiO2 90% while on Vapotherm with 40L of 85% O2


D/W RN, Chart review


We will continue to monitor and discharge disposition in process via social work








8/16/2021


Patient in prone position upon entry into room. States that he placed himself in

this position after he had read that it would help online. 


Upon exam in bed he was awake, alert and NAD


FiO2 90% while on Vapotherm with 40L of 85% O2


D/W RN, Chart review


We will continue to monitor and discharge disposition in process via social work


Patient remains on Vapotherm 40 L, 80%


afebrile 








8/17/2021


Patient in prone position upon entry into room. States that he placed himself in

this position after he had read that it would help online. 


Upon exam in bed he was awake, alert and NAD


FiO2 90% while on Vapotherm with 40L of 85% O2


D/W RN, Chart review


We will continue to monitor and discharge disposition in process via social work


Patient remains on Vapotherm 40 L, 80%


afebrile 


remdesivir/Tocilizumab  


PT/OT


DVT/GI PPX :lovenox 











8/18/2021





Upon exam in bed he was awake, alert and NAD


on Vapotherm with 40L of 94% O2


D/W RN, Chart review


We will continue to monitor and discharge disposition in process via social work


SYMPTOMS SLOW TO RESOLVE


afebrile 


remdesivir/Tocilizumab  


PT/OT


DVT/GI PPX :lovenox 


CXR 8-18 8/19/2021





Upon exam in bed he was awake, alert and NAD


 70%  on Vapotherm with 40L of 94% O2


D/W RN, Chart review


We will continue to monitor and discharge disposition in process via social work


SYMPTOMS SLOW TO RESOLVE


afebrile 


remdesivir/Tocilizumab  


PT/OT


DVT/GI PPX :lovenox 


CXR 8-18





Vitals


Vitals





Vital Signs








  Date Time  Temp Pulse Resp B/P (MAP) Pulse Ox O2 Delivery O2 Flow Rate FiO2


 


8/19/21 08:15     93 VAPOTHERM 40.0 


 


8/18/21 23:00 98.1 81 18 120/74 (89)    





 98.1       











Physical Exam


Physical Exam








GEN axo3 male on BIPAP


HEENT:  Normocephalic, atraumatic.  Anicteric. 


NECK:  Supple, no JVD.


LUNGS:  Crackles bilaterally.   


HEART:  S1, S2.  


ABDOMEN:  Soft, nontender, nondistended.  Bowel sounds present.


EXTREMITIES:  No edema, no cyanosis.


DERMATOLOGIC:  Warm, dry, no generalized rash.


NEUROLOGIC:  Alert, oriented, grossly nonfocal.


PSYCHIATRIC:  Calm and cooperative. 


 PIV looks clean.


General:  Alert, Oriented X3, Cooperative, mild distress


Heart:  Regular rate (tele reg,  reviewed)


Lungs:  Clear


Abdomen:  Normal bowel sounds, Soft


Extremities:  No clubbing


Skin:  No rashes





Comment


Review of Relevant


I have reviewed the following items genesis (where applicable) has been applied.


Medications





Current Medications


Acetaminophen (Tylenol) 650 mg PRN Q6HRS  PRN PO MILD PAIN / TEMP > 100.3'F Last

administered on 7/27/21at 20:09;  Start 7/27/21 at 19:00;  Stop 7/27/21 at 

20:15;  Status DC


Enoxaparin Sodium (Lovenox Per Pharmacy Prophylaxis Dosing) 1 each PRN DAILY  

PRN MC SEE COMMENTS;  Start 7/27/21 at 19:45


Enoxaparin Sodium (Lovenox 40mg Syringe) 40 mg Q24H SQ  Last administered on 

8/18/21at 20:52;  Start 7/27/21 at 20:00


Remdesivir 200 mg/ Sodium Chloride 210 ml @  210 mls/hr 1X  ONCE IV  Last 

administered on 7/27/21at 20:08;  Start 7/27/21 at 20:00;  Stop 7/27/21 at 

20:59;  Status DC


Remdesivir 100 mg/ Sodium Chloride 230 ml @  460 mls/hr Q24H IV  Last 

administered on 7/31/21at 21:20;  Start 7/28/21 at 20:00;  Stop 7/31/21 at 

20:29;  Status DC


Acetaminophen (Tylenol) 650 mg PRN Q6HRS  PRN PO MILD PAIN OR FEVER Last 

administered on 8/15/21at 21:38;  Start 7/27/21 at 19:45


Ascorbic Acid (Vitamin C) 1,000 mg DAILY PO  Last administered on 8/18/21at 

08:52;  Start 7/28/21 at 09:00


Cetirizine HCl (ZyrTEC) 10 mg DAILY PO  Last administered on 8/18/21at 08:54;  

Start 7/28/21 at 09:00


Vitamin D (Vitamin D3) 5,000 unit DAILY PO  Last administered on 8/18/21at 

08:54;  Start 7/28/21 at 09:00


Enoxaparin Sodium (Lovenox 30mg Syringe) 30 mg Q24H SQ ;  Start 7/27/21 at 

21:00;  Status Cancel


Famotidine (Pepcid) 20 mg HS PO  Last administered on 8/18/21at 20:52;  Start 

7/27/21 at 21:00


Non-Formulary Medication (Ipratropium/ Albuterol Sulfate (Combivent Respimat 

Inhal)) 2 inh QID IH ;  Start 7/27/21 at 21:00;  Status UNV


Methylprednisolone Sodium Succinate (SOLU-Medrol 125MG VIAL) 60 mg Q8HRS IV  

Last administered on 8/6/21at 05:41;  Start 7/27/21 at 22:00;  Stop 8/6/21 at 

10:13;  Status DC


Morphine Sulfate (Morphine Sulfate) 2 mg PRN Q2HR  PRN IVP MODERATE TO SEVERE 

PAIN Last administered on 8/11/21at 22:56;  Start 7/27/21 at 20:30


Zinc Sulfate (Orazinc) 220 mg DAILY PO  Last administered on 8/18/21at 08:52;  

Start 7/28/21 at 09:00


Albuterol/ Ipratropium (Duoneb) 3 ml RTQID NEB ;  Start 7/27/21 at 21:00;  Sta

tus Cancel


Albuterol/ Ipratropium (Combivent Respimat  Mcg) 2 puff RTQID INH  Last 

administered on 8/18/21at 20:52;  Start 7/27/21 at 21:00


Alprazolam (Xanax) 0.25 mg PRN Q8HRS  PRN PO ANXIETY / AGITATION Last 

administered on 7/29/21at 08:59;  Start 7/28/21 at 15:15;  Stop 7/30/21 at 

14:14;  Status DC


Lorazepam (Ativan Inj) 1 mg PRN Q8HRS  PRN IVP ANXIETY / AGITATION Last adm

inistered on 7/29/21at 14:05;  Start 7/29/21 at 11:15;  Stop 7/30/21 at 14:14;  

Status DC


Thiamine Mononitrate (Vitamin B-1) 100 mg DAILY PO  Last administered on 

8/18/21at 08:52;  Start 7/30/21 at 09:00


Sterile Water (WATER for RESP) 1,000 ml CONT  PRN INH VIA VAPOTHERM DEVICE Last 

administered on 8/19/21at 00:14;  Start 7/29/21 at 19:15


Haloperidol Lactate (Haldol Inj) 5 mg PRN Q6HRS  PRN IVP AGITATION;  Start 

7/30/21 at 14:15


Insulin Human Lispro (HumaLOG) 0-7 UNITS TIDWMEALS SQ ;  Start 7/31/21 at 12:00;

 Stop 8/1/21 at 09:08;  Status DC


Dextrose (Dextrose 50%-Water Syringe) 12.5 gm PRN Q15MIN  PRN IV SEE COMMENTS;  

Start 7/31/21 at 10:15


Potassium Chloride/Dextrose/ Sod Cl 1,000 ml @  100 mls/hr Q10H IV  Last 

administered on 8/1/21at 16:54;  Start 7/31/21 at 10:15;  Stop 8/1/21 at 22:33; 

Status DC


Insulin Glargine (Lantus Syringe) 8 unit DAILY SQ ;  Start 8/1/21 at 09:00;  

Stop 8/1/21 at 08:50;  Status DC


Tocilizumab 800 mg/Sodium Chloride 100 ml @  100 mls/hr 1X  ONCE IV ;  Start 

8/2/21 at 09:00;  Stop 8/2/21 at 09:59;  Status Cancel


Methylprednisolone Sodium Succinate (SOLU-Medrol 40MG VIAL) 60 mg BID66 IV  Last

administered on 8/11/21at 05:45;  Start 8/6/21 at 18:00;  Stop 8/11/21 at 09:54;

 Status DC


Guaifenesin (Mucinex) 1,200 mg BID PO  Last administered on 8/18/21at 20:52;  

Start 8/8/21 at 21:00


Polyethylene Glycol (miraLAX PACKET) 17 gm PRN BID  PRN PO CONSTIPATION;  Start 

8/9/21 at 10:30


Methylprednisolone Sodium Succinate (SOLU-Medrol 125MG VIAL) 60 mg DAILY IV  

Last administered on 8/16/21at 08:53;  Start 8/12/21 at 09:00;  Stop 8/17/21 at 

08:34;  Status DC


Methylprednisolone Sodium Succinate (SOLU-Medrol 125MG VIAL) 40 mg DAILY IV  

Last administered on 8/18/21at 08:55;  Start 8/17/21 at 09:00;  Stop 8/18/21 at 

20:48;  Status DC


Prednisone (Prednisone) 30 mg DAILY PO ;  Start 8/19/21 at 09:00





Active Scripts


Active


Reported


Morphine Sulfate 2 Mg/1 Ml Cartridge 2 Mg IV PRN Q4HRS PRN


Zinc Sulfate 50 Mg Tablet 220 Mg PO DAILY


Methylprednisolone Sod Succ 125 Mg Vial 60 Mg IJ Q8HRS


Combivent Respimat Inhal (Ipratropium/Albuterol Sulfate) 4 Gm Aer.w.adap 2 Inh 

IH QID


Pepcid (Famotidine) 20 Mg Tablet 20 Mg PO HS


Lovenox (Enoxaparin Sodium) 30 Mg/0.3 Ml Disp.syrin 30 Mg SQ DAILY


Vitamin D3 ** (Vitamin D) 125 Mcg Capsule 125 Mcg PO DAILY


     5,000 UNITS = 125 MCG


Zyrtec (Cetirizine Hcl) 10 Mg Tablet 1 Tab PO DAILY


Ascorbic Acid 500 Mg Tablet 2 Mg PO DAILY


Acetaminophen 325 Mg Tablet 2 Tab PO PRN Q6HRS PRN 30 Days


No Known Medications Prior To Admisstion (Info)  Each 1 Each 


Vitals/I & O





Vital Sign - Last 24 Hours








 8/18/21 8/18/21 8/18/21 8/18/21





 11:00 12:41 14:57 16:26


 


Temp 98.5  96.8 





 98.5  96.8 


 


Pulse 92  128 


 


Resp 20  20 


 


B/P (MAP) 119/68 (85)  145/83 (103) 


 


Pulse Ox 96 99 81 95


 


O2 Delivery vapotherm VAPOTHERM vapotherm VAPOTHERM


 


O2 Flow Rate 40.0 40.0 40.0 40.0


 


    





    





 8/18/21 8/18/21 8/18/21 8/18/21





 19:00 20:00 21:05 23:00


 


Temp 98.6   98.1





 98.6   98.1


 


Pulse 100   81


 


Resp 18   18


 


B/P (MAP) 127/71 (89)   120/74 (89)


 


Pulse Ox 92  95 93


 


O2 Delivery vapotherm Vapotherm VAPOTHERM vapotherm


 


O2 Flow Rate 40.0 40.0 40.0 40.0


 


    





    





 8/19/21 8/19/21 8/19/21 8/19/21





 00:33 03:35 05:00 08:15


 


Pulse Ox 94 94 94 93


 


O2 Delivery VAPOTHERM VAPOTHERM VAPOTHERM VAPOTHERM


 


O2 Flow Rate 40.0 40.0 40.0 40.0














Intake and Output   


 


 8/18/21 8/18/21 8/19/21





 15:00 23:00 07:00


 


Intake Total 545 ml 405 ml 300 ml


 


Output Total 350 ml 900 ml 800 ml


 


Balance 195 ml -495 ml -500 ml











Justicifation of Admission Dx:


Justifications for Admission:


Justification of Admission Dx:  Yes


Sepsis:  Hypoxemia











EMMA LORD MD          Aug 19, 2021 09:34

## 2021-08-19 NOTE — NUR
SS following up with discharge planning. SS reviewed pt chart and discussed with pt RN. Pt 
is currently on Vapotherm at 70%. COVID19 positive. PT/OT recommended home. [employer] 
denied pt for LTACH placement. SS will continue to follow for discharge planning.

## 2021-08-19 NOTE — PDOC
PULMONARY PROGRESS NOTES


DATE: 8/19/21 


TIME: 10:04


Subjective


Patient remains on Vapotherm 40 L, 70%


afebrile 


anxious about getting better 


no overnight concerns


Vitals





Vital Signs








  Date Time  Temp Pulse Resp B/P (MAP) Pulse Ox O2 Delivery O2 Flow Rate FiO2


 


8/19/21 08:15 97.5 75 16 111/64 (80) 90   





 97.5       


 


8/19/21 08:15      VAPOTHERM 40.0 








Comments


Pt. seen during covid -19 pandemic visual exam preformed 


no distress 


RRR


Vapotherm 80%


no obvious rash or edema


General:  Alert, No acute distress


Medications





Active Scripts








 Medications  Dose


 Route/Sig


 Max Daily Dose Days Date Category Dose


Instructions


 


 Morphine Sulfate


 2 Mg/1 Ml


 Cartridge  2 Mg


 IV PRN Q4HRS PRN


   7/27/21 Reported 


 


 Zinc Sulfate 50


 Mg Tablet  220 Mg


 PO DAILY


   7/27/21 Reported 


 


 Methylprednisolone


 Sod Succ 125 Mg


 Vial  60 Mg


 IJ Q8HRS


   7/27/21 Reported 


 


 Combivent


 Respimat Inhal


  (Ipratropium/Albuterol


 Sulfate) 4 Gm


 Aer.w.adap  2 Inh


 IH QID


   7/27/21 Reported 


 


 Pepcid


  (Famotidine) 20


 Mg Tablet  20 Mg


 PO HS


   7/27/21 Reported 


 


 Lovenox


  (Enoxaparin


 Sodium) 30 Mg/0.3


 Ml Disp.syrin  30 Mg


 SQ DAILY


   7/27/21 Reported 


 


 Vitamin D3 **


  (Vitamin D) 125


 Mcg Capsule  125 Mcg


 PO DAILY


   7/27/21 Reported  5,000 UNITS = 125 MCG


 


 Zyrtec


  (Cetirizine Hcl)


 10 Mg Tablet  1 Tab


 PO DAILY


   7/27/21 Reported 


 


 Ascorbic Acid 500


 Mg Tablet  2 Mg


 PO DAILY


   7/27/21 Reported 


 


 Acetaminophen 325


 Mg Tablet  2 Tab


 PO PRN Q6HRS PRN


  30 7/27/21 Reported 


 


 No Known


 Medications Prior


 To Admisstion


  (Info)  Each  1 Each


 


   8/25/20 Reported 











Impression


.


IMPRESSION:


1.  Acute hypoxemic respiratory failure secondary to COVID-19 viral 

pneumonia./Acute lung injury and early ARDS.  Slowly improving


2.  COVID-19 viral pneumonia.


3.  Abnormal chest x-ray, compatible with viral pneumonia.


4.  Nonspecific anxiety, possible clinical depression





Plan


.


Updated 8/19/21


Continue supplemental oxygen to keep sats above 92% currently on Vapotherm, 

currently on 70% and 40 liters will slowly wean as tolerated, slowly improving


Once down to 60% FiO2 via Vapotherm, will try nasal cannula


IS at bedside 


Continue steroids with taper, on p.o. prednisone now


S/P remdesivir/Tocilizumab  


PT/OT


DVT/GI PPX :lovenox 


D/W RN and RT











ASHLEY YEAGER MD                 Aug 19, 2021 10:06

## 2021-08-20 VITALS — SYSTOLIC BLOOD PRESSURE: 126 MMHG | DIASTOLIC BLOOD PRESSURE: 80 MMHG

## 2021-08-20 VITALS — DIASTOLIC BLOOD PRESSURE: 78 MMHG | SYSTOLIC BLOOD PRESSURE: 124 MMHG

## 2021-08-20 VITALS — SYSTOLIC BLOOD PRESSURE: 112 MMHG | DIASTOLIC BLOOD PRESSURE: 64 MMHG

## 2021-08-20 VITALS — DIASTOLIC BLOOD PRESSURE: 80 MMHG | SYSTOLIC BLOOD PRESSURE: 122 MMHG

## 2021-08-20 VITALS — DIASTOLIC BLOOD PRESSURE: 70 MMHG | SYSTOLIC BLOOD PRESSURE: 117 MMHG

## 2021-08-20 RX ADMIN — Medication SCH MG: at 10:38

## 2021-08-20 RX ADMIN — CHOLECALCIFEROL CAP 125 MCG (5000 UNIT) SCH UNIT: 125 CAP at 10:35

## 2021-08-20 RX ADMIN — IPRATROPIUM BROMIDE AND ALBUTEROL SCH PUFF: 20; 100 SPRAY, METERED RESPIRATORY (INHALATION) at 13:06

## 2021-08-20 RX ADMIN — ENOXAPARIN SODIUM SCH MG: 40 INJECTION SUBCUTANEOUS at 20:58

## 2021-08-20 RX ADMIN — IPRATROPIUM BROMIDE AND ALBUTEROL SCH PUFF: 20; 100 SPRAY, METERED RESPIRATORY (INHALATION) at 16:00

## 2021-08-20 RX ADMIN — IPRATROPIUM BROMIDE AND ALBUTEROL SCH PUFF: 20; 100 SPRAY, METERED RESPIRATORY (INHALATION) at 10:38

## 2021-08-20 RX ADMIN — Medication SCH MG: at 10:37

## 2021-08-20 RX ADMIN — WATER PRN ML: 1 INJECTION INTRAVENOUS at 13:40

## 2021-08-20 RX ADMIN — ZINC SULFATE CAP 220 MG (50 MG ELEMENTAL ZN) SCH MG: 220 (50 ZN) CAP at 10:35

## 2021-08-20 RX ADMIN — IPRATROPIUM BROMIDE AND ALBUTEROL SCH PUFF: 20; 100 SPRAY, METERED RESPIRATORY (INHALATION) at 20:58

## 2021-08-20 RX ADMIN — CETIRIZINE HYDROCHLORIDE SCH MG: 10 TABLET, FILM COATED ORAL at 10:37

## 2021-08-20 RX ADMIN — FAMOTIDINE SCH MG: 20 TABLET ORAL at 20:58

## 2021-08-20 NOTE — NUR
SS following up with discharge planning. SS reviewed pt chart and discussed with pt RN. Pt 
is currently on Vapotherm at 60%. COVID19 positive. PT/OT recommended home. [employer] 
denied pt for LTACH placement. SS will continue to follow for discharge planning.

## 2021-08-20 NOTE — PDOC
PULMONARY PROGRESS NOTES


DATE: 8/20/21 


TIME: 10:38


Subjective


Patient remains on Vapotherm 40 L, 60%


afebrile 


anxious about getting better 


no overnight concerns


Vitals





Vital Signs








  Date Time  Temp Pulse Resp B/P (MAP) Pulse Ox O2 Delivery O2 Flow Rate FiO2


 


8/20/21 07:32     94 VAPOTHERM 40.0 


 


8/20/21 07:00 98.0 96 18 117/70 (86)    





 98.0       








Comments


Pt. seen during covid -19 pandemic visual exam preformed 


no distress 


RRR


Vapotherm 80%


no obvious rash or edema


General:  Alert, No acute distress


Labs





Laboratory Tests








Test


 8/19/21


09:10


 


Sodium Level


 141 mmol/L


(136-145)


 


Potassium Level


 3.8 mmol/L


(3.5-5.1)


 


Chloride Level


 104 mmol/L


()


 


Carbon Dioxide Level


 28 mmol/L


(21-32)


 


Anion Gap 9 (6-14) 


 


Blood Urea Nitrogen


 24 mg/dL


(8-26)


 


Creatinine


 0.9 mg/dL


(0.7-1.3)


 


Estimated GFR


(Cockcroft-Gault) 87.3 





 


Glucose Level


 152 mg/dL


(70-99)


 


Calcium Level


 8.8 mg/dL


(8.5-10.1)


 


Ferritin


 1520 ng/mL


()


 


C-Reactive Protein,


Quantitative 2.1 mg/L


(0-3.3)








Medications





Active Scripts








 Medications  Dose


 Route/Sig


 Max Daily Dose Days Date Category Dose


Instructions


 


 Morphine Sulfate


 2 Mg/1 Ml


 Cartridge  2 Mg


 IV PRN Q4HRS PRN


   7/27/21 Reported 


 


 Zinc Sulfate 50


 Mg Tablet  220 Mg


 PO DAILY


   7/27/21 Reported 


 


 Methylprednisolone


 Sod Succ 125 Mg


 Vial  60 Mg


 IJ Q8HRS


   7/27/21 Reported 


 


 Combivent


 Respimat Inhal


  (Ipratropium/Albuterol


 Sulfate) 4 Gm


 Aer.w.adap  2 Inh


 IH QID


   7/27/21 Reported 


 


 Pepcid


  (Famotidine) 20


 Mg Tablet  20 Mg


 PO HS


   7/27/21 Reported 


 


 Lovenox


  (Enoxaparin


 Sodium) 30 Mg/0.3


 Ml Disp.syrin  30 Mg


 SQ DAILY


   7/27/21 Reported 


 


 Vitamin D3 **


  (Vitamin D) 125


 Mcg Capsule  125 Mcg


 PO DAILY


   7/27/21 Reported  5,000 UNITS = 125 MCG


 


 Zyrtec


  (Cetirizine Hcl)


 10 Mg Tablet  1 Tab


 PO DAILY


   7/27/21 Reported 


 


 Ascorbic Acid 500


 Mg Tablet  2 Mg


 PO DAILY


   7/27/21 Reported 


 


 Acetaminophen 325


 Mg Tablet  2 Tab


 PO PRN Q6HRS PRN


  30 7/27/21 Reported 


 


 No Known


 Medications Prior


 To Admisstion


  (Info)  Each  1 Each


 


   8/25/20 Reported 











Impression


.


IMPRESSION:


1.  Acute hypoxemic respiratory failure secondary to COVID-19 viral 

pneumonia./Acute lung injury and early ARDS.  Slowly improving


2.  COVID-19 viral pneumonia.


3.  Abnormal chest x-ray, compatible with viral pneumonia.


4.  Nonspecific anxiety, possible clinical depression





Plan


.


Updated 8/20/21


Continue supplemental oxygen to keep sats above 92% currently on Vapotherm, 

currently on 60% and 40 liters will slowly wean as tolerated, slowly improving


will try nasal cannula


IS at bedside 


Continue steroids with taper, on p.o. prednisone now


S/P remdesivir/Tocilizumab  


PT/OT


DVT/GI PPX :lovenox 


D/W RN and RT

















Updated 8/19/21


Continue supplemental oxygen to keep sats above 92% currently on Vapotherm, 

currently on 70% and 40 liters will slowly wean as tolerated, slowly improving


Once down to 60% FiO2 via Vapotherm, will try nasal cannula


IS at bedside 


Continue steroids with taper, on p.o. prednisone now


S/P remdesivir/Tocilizumab  


PT/OT


DVT/GI PPX :lovenox 


D/W RN and RT











ASHLEY YEAGER MD                 Aug 20, 2021 10:39

## 2021-08-20 NOTE — PDOC
PROGRESS NOTES


Date of Service:


DATE: 8/20/21 


TIME: 09:11





Chief Complaint


Chief Complaint


SEPSIS, was POA


Severe malnutrition was POA, with obesity, BMI 32


Acute hypoxic respiratory failure


COVID-19 pneumonia r


transaminitis





History of Present Illness


History of Present Illness


Mr Read is a 56-year-old male with no significant past medical history except 

for degenerative joint disease who was transferred from [facility]  

with complaints of fever, headache, dry cough, shortness of breath for the past 

3 days, admitted on 7/26/2021.  Patient stated that he was actually exposed from

his son he was worsening and required to come to the ED at [facility] and he was

found to be hypoxic and required 2 L of nasal cannula oxygen.  Chest x-ray over 

there showed bilateral mild bibasilar infiltrates.  Covid test was positive and 

patient was transferred to [facility] for pulmonary evaluation and treatment.  Patient 

was started on Remdesivir and IV steroids.  Currently patient is requiring 15 L 

high flow nasal cannula and IV steroids.





7/30: No acute events overnight.  Patient was saturating 92% on 15 L nasal 

cannula and he had desatted down to 70% after he had to blow his nose.  Patient 

was placed on BiPAP at 100% FiO2.  Chest leg appears to be worsening.


7/31: Last dose of remdesivir. Could not tolerate vapotherm and is now on BIPAP.


8/1:  poor Po intake, dry,   dark urine


8/2: Desaturates to 72% when he takes off his BiPAP.  Advised to get back on 

BIPAP after eating. he is able to place himself back on. Frustrated with the 

severity of his hypoxia.


8/3: Status post remdesivir. Still requiring BiPAP 75% FiO2 with significant 

desaturations when he is not wearing assessment with nonrebreather O2.


8/4: Still with significant desaturations when he raises BiPAP even on 15 L high

flow nasal cannula.  Discussed transitioning back to Vapotherm if we can treat 

his anxiety.  He is amenable to this.  He is frustrated with staying in the 

hospital understands he may be a longer term COVID recovery and is amenable to 

LTACH referral.  I discussed with pulmonology.


8/5: Afebrile. 70% FiO2 on BiPAP 12/6.  Feels better.  Did not tolerated 

Vapotherm.  Significant desaturations when he transitions to high flow nasal 

cannula O2 while eating.  Discussed referral to LTACH with pulmonology and ID as

it appears he will need a longer COVID recovery.


8/6: Patient on 70% FiO2.  Via BiPAP 12/6 when seen by pulmonology. Feels better

and is seen on vapotherm 40l/min 100% FiO2 with saturations 89%, tolerating 

well.





8/8/2021


patient seen at edge of bed. FiO2 at 89%. Dropped to low 80s while talking to 

us. Educated patient on conserving energy and working to keep FiO2 higher.


D/W RN


Chart Reviewed





8/9/2021


Patient was seen and examined in bed. FiO2 of 87% while on Vapotherm with 40L of

90% O2.


D/W RN, Chart Review


Patient has not had a bowel movement in two days. 


Patient feels he is doing better but his FiO2 still drops with activity or 

speaking


Discussed need to rest and focus on breathing to maintain proper FiO2.





8/10/2021


Patient was resting in bed with NAD


Seen and examined in bed. FiO2 of 92% while on Vapotherm with 40L of 90% O2.


D/W RN


Chart Review


Discussed need to rest and focus on breathing to maintain proper FiO2





8/11/2021


Patient was awake, alert and NAD


Seen and examine in bed.


FiO2 92% while on Vapotherm with 40L of 90% O2


Discussed that we will continue to monitor for clinical improvement





8/12/2021


Patient resting upon entry into room. Upon exam in bed he was awake, alert and 

NAD


FiO2 90% while on Vapotherm with 40L of 90% O2


Discussed continued monitoring and that he is making slow improvements.


Patient was encouraged by improvements and eager to return to as high of 

function as possible.





8/13/2021


Patient awake, alert and on computer.


FiO2 92% while on Vapotherm with 40L of 90% O2


Continuing to make slow improvements


Discussed continuing treatment plan at this time





8/14/2021


Patient resting upon entry into room. Upon exam in bed he was awake, alert and 

NAD


FiO2 90% while on Vapotherm with 40L of 85% O2


We will continue to monitor as he is making slow improvements


Discharge plan will be created as he continues with current treatment





8/15/2021


Patient in prone position upon entry into room. States that he placed himself in

this position after he had read that it would help online. 


Upon exam in bed he was awake, alert and NAD


FiO2 90% while on Vapotherm with 40L of 85% O2


D/W RN, Chart review


We will continue to monitor and discharge disposition in process via social work








8/16/2021


Patient in prone position upon entry into room. States that he placed himself in

this position after he had read that it would help online. 


Upon exam in bed he was awake, alert and NAD


FiO2 90% while on Vapotherm with 40L of 85% O2


D/W RN, Chart review


We will continue to monitor and discharge disposition in process via social work


Patient remains on Vapotherm 40 L, 80%


afebrile 








8/17/2021


Patient in prone position upon entry into room. States that he placed himself in

this position after he had read that it would help online. 


Upon exam in bed he was awake, alert and NAD


FiO2 90% while on Vapotherm with 40L of 85% O2


D/W RN, Chart review


We will continue to monitor and discharge disposition in process via social work


Patient remains on Vapotherm 40 L, 80%


afebrile 


remdesivir/Tocilizumab  


PT/OT


DVT/GI PPX :lovenox 











8/18/2021





Upon exam in bed he was awake, alert and NAD


on Vapotherm with 40L of 94% O2


D/W RN, Chart review


We will continue to monitor and discharge disposition in process via social work


SYMPTOMS SLOW TO RESOLVE


afebrile 


remdesivir/Tocilizumab  


PT/OT


DVT/GI PPX :lovenox 


CXR 8-18 8/19/2021





Upon exam in bed he was awake, alert and NAD


 70%  on Vapotherm with 40L of 94% O2


D/W RN, Chart review


We will continue to monitor and discharge disposition in process via social work


SYMPTOMS SLOW TO RESOLVE


afebrile 


remdesivir/Tocilizumab  


PT/OT


DVT/GI PPX :lovenox 


CXR 8-18 8/20/2021





Upon exam in bed he was DOSING /NAD


 70%  on Vapotherm with 40L of 94% O2


D/W RN, Chart review


We will continue to monitor and discharge disposition in process via social work


SYMPTOMS SLOW TO RESOLVE


afebrile 


remdesivir/Tocilizumab  


PT/OT


DVT/GI PPX :lovenox 


CXR 8-18

















8/20/2021





RESTING IN BED


 70%  on Vapotherm with 40L of 94% O2


D/W RN, Chart review


We will continue to monitor and discharge disposition in process via social work


SYMPTOMS SLOW TO RESOLVE


afebrile 


remdesivir/Tocilizumab  


PT/OT


DVT/GI PPX :lovenox 


CXR 8-18


Once down to 60% FiO2 via Vapotherm, try nasal cannula


IS at bedside 


Continue steroids with taper, on p.o. prednisone now








D/W RN





Vitals


Vitals





Vital Signs








  Date Time  Temp Pulse Resp B/P (MAP) Pulse Ox O2 Delivery O2 Flow Rate FiO2


 


8/20/21 07:32     94 VAPOTHERM 40.0 


 


8/20/21 07:00 98.0 96 18 117/70 (86)    





 98.0       











Physical Exam


Physical Exam








GEN axo3 male on BIPAP


HEENT:  Normocephalic, atraumatic.  Anicteric. 


NECK:  Supple, no JVD.


LUNGS:  Crackles bilaterally.   


HEART:  S1, S2.  


ABDOMEN:  Soft, nontender, nondistended.  Bowel sounds present.


EXTREMITIES:  No edema, no cyanosis.


DERMATOLOGIC:  Warm, dry, no generalized rash.


NEUROLOGIC:  Alert, oriented, grossly nonfocal.


PSYCHIATRIC:  Calm and cooperative. 


 PIV looks clean.


General:  Alert, Oriented X3, Cooperative, mild distress


Heart:  Regular rate (tele reg,  reviewed)


Abdomen:  Normal bowel sounds, Soft


Extremities:  No clubbing


Skin:  No rashes





Comment


Review of Relevant


I have reviewed the following items genesis (where applicable) has been applied.


Labs





Laboratory Tests








Test


 8/19/21


09:10


 


Sodium Level


 141 mmol/L


(136-145)


 


Potassium Level


 3.8 mmol/L


(3.5-5.1)


 


Chloride Level


 104 mmol/L


()


 


Carbon Dioxide Level


 28 mmol/L


(21-32)


 


Anion Gap 9 (6-14) 


 


Blood Urea Nitrogen


 24 mg/dL


(8-26)


 


Creatinine


 0.9 mg/dL


(0.7-1.3)


 


Estimated GFR


(Cockcroft-Gault) 87.3 





 


Glucose Level


 152 mg/dL


(70-99)


 


Calcium Level


 8.8 mg/dL


(8.5-10.1)


 


Ferritin


 1520 ng/mL


()


 


C-Reactive Protein,


Quantitative 2.1 mg/L


(0-3.3)








Medications





Current Medications


Acetaminophen (Tylenol) 650 mg PRN Q6HRS  PRN PO MILD PAIN / TEMP > 100.3'F Last

administered on 7/27/21at 20:09;  Start 7/27/21 at 19:00;  Stop 7/27/21 at 

20:15;  Status DC


Enoxaparin Sodium (Lovenox Per Pharmacy Prophylaxis Dosing) 1 each PRN DAILY  

PRN MC SEE COMMENTS;  Start 7/27/21 at 19:45


Enoxaparin Sodium (Lovenox 40mg Syringe) 40 mg Q24H SQ  Last administered on 

8/19/21at 21:07;  Start 7/27/21 at 20:00


Remdesivir 200 mg/ Sodium Chloride 210 ml @  210 mls/hr 1X  ONCE IV  Last 

administered on 7/27/21at 20:08;  Start 7/27/21 at 20:00;  Stop 7/27/21 at 

20:59;  Status DC


Remdesivir 100 mg/ Sodium Chloride 230 ml @  460 mls/hr Q24H IV  Last 

administered on 7/31/21at 21:20;  Start 7/28/21 at 20:00;  Stop 7/31/21 at 

20:29;  Status DC


Acetaminophen (Tylenol) 650 mg PRN Q6HRS  PRN PO MILD PAIN OR FEVER Last 

administered on 8/15/21at 21:38;  Start 7/27/21 at 19:45


Ascorbic Acid (Vitamin C) 1,000 mg DAILY PO  Last administered on 8/19/21at 

09:45;  Start 7/28/21 at 09:00


Cetirizine HCl (ZyrTEC) 10 mg DAILY PO  Last administered on 8/19/21at 09:45;  

Start 7/28/21 at 09:00


Vitamin D (Vitamin D3) 5,000 unit DAILY PO  Last administered on 8/19/21at 

09:45;  Start 7/28/21 at 09:00


Enoxaparin Sodium (Lovenox 30mg Syringe) 30 mg Q24H SQ ;  Start 7/27/21 at 

21:00;  Status Cancel


Famotidine (Pepcid) 20 mg HS PO  Last administered on 8/19/21at 21:07;  Start 

7/27/21 at 21:00


Non-Formulary Medication (Ipratropium/ Albuterol Sulfate (Combivent Respimat 

Inhal)) 2 inh QID IH ;  Start 7/27/21 at 21:00;  Status UNV


Methylprednisolone Sodium Succinate (SOLU-Medrol 125MG VIAL) 60 mg Q8HRS IV  

Last administered on 8/6/21at 05:41;  Start 7/27/21 at 22:00;  Stop 8/6/21 at 

10:13;  Status DC


Morphine Sulfate (Morphine Sulfate) 2 mg PRN Q2HR  PRN IVP MODERATE TO SEVERE 

PAIN Last administered on 8/11/21at 22:56;  Start 7/27/21 at 20:30


Zinc Sulfate (Orazinc) 220 mg DAILY PO  Last administered on 8/19/21at 09:45;  

Start 7/28/21 at 09:00


Albuterol/ Ipratropium (Duoneb) 3 ml RTQID NEB ;  Start 7/27/21 at 21:00;  

Status Cancel


Albuterol/ Ipratropium (Combivent Respimat  Mcg) 2 puff RTQID INH  Last 

administered on 8/19/21at 21:06;  Start 7/27/21 at 21:00


Alprazolam (Xanax) 0.25 mg PRN Q8HRS  PRN PO ANXIETY / AGITATION Last 

administered on 7/29/21at 08:59;  Start 7/28/21 at 15:15;  Stop 7/30/21 at 

14:14;  Status DC


Lorazepam (Ativan Inj) 1 mg PRN Q8HRS  PRN IVP ANXIETY / AGITATION Last 

administered on 7/29/21at 14:05;  Start 7/29/21 at 11:15;  Stop 7/30/21 at 

14:14;  Status DC


Thiamine Mononitrate (Vitamin B-1) 100 mg DAILY PO  Last administered on 

8/19/21at 09:45;  Start 7/30/21 at 09:00


Sterile Water (WATER for RESP) 1,000 ml CONT  PRN INH VIA VAPOTHERM DEVICE Last 

administered on 8/19/21at 13:04;  Start 7/29/21 at 19:15


Haloperidol Lactate (Haldol Inj) 5 mg PRN Q6HRS  PRN IVP AGITATION;  Start 

7/30/21 at 14:15


Insulin Human Lispro (HumaLOG) 0-7 UNITS TIDWMEALS SQ ;  Start 7/31/21 at 12:00;

 Stop 8/1/21 at 09:08;  Status DC


Dextrose (Dextrose 50%-Water Syringe) 12.5 gm PRN Q15MIN  PRN IV SEE COMMENTS;  

Start 7/31/21 at 10:15


Potassium Chloride/Dextrose/ Sod Cl 1,000 ml @  100 mls/hr Q10H IV  Last 

administered on 8/1/21at 16:54;  Start 7/31/21 at 10:15;  Stop 8/1/21 at 22:33; 

Status DC


Insulin Glargine (Lantus Syringe) 8 unit DAILY SQ ;  Start 8/1/21 at 09:00;  

Stop 8/1/21 at 08:50;  Status DC


Tocilizumab 800 mg/Sodium Chloride 100 ml @  100 mls/hr 1X  ONCE IV ;  Start 

8/2/21 at 09:00;  Stop 8/2/21 at 09:59;  Status Cancel


Methylprednisolone Sodium Succinate (SOLU-Medrol 40MG VIAL) 60 mg [id number] IV  Last

administered on 8/11/21at 05:45;  Start 8/6/21 at 18:00;  Stop 8/11/21 at 09:54;

 Status DC


Guaifenesin (Mucinex) 1,200 mg BID PO  Last administered on 8/19/21at 21:07;  

Start 8/8/21 at 21:00


Polyethylene Glycol (miraLAX PACKET) 17 gm PRN BID  PRN PO CONSTIPATION;  Start 

8/9/21 at 10:30


Methylprednisolone Sodium Succinate (SOLU-Medrol 125MG VIAL) 60 mg DAILY IV  

Last administered on 8/16/21at 08:53;  Start 8/12/21 at 09:00;  Stop 8/17/21 at 

08:34;  Status DC


Methylprednisolone Sodium Succinate (SOLU-Medrol 125MG VIAL) 40 mg DAILY IV  

Last administered on 8/18/21at 08:55;  Start 8/17/21 at 09:00;  Stop 8/18/21 at 

20:48;  Status DC


Prednisone (Prednisone) 30 mg DAILY PO  Last administered on 8/19/21at 09:45;  

Start 8/19/21 at 09:00





Active Scripts


Active


Reported


Morphine Sulfate 2 Mg/1 Ml Cartridge 2 Mg IV PRN Q4HRS PRN


Zinc Sulfate 50 Mg Tablet 220 Mg PO DAILY


Methylprednisolone Sod Succ 125 Mg Vial 60 Mg IJ Q8HRS


Combivent Respimat Inhal (Ipratropium/Albuterol Sulfate) 4 Gm Aer.w.adap 2 Inh 

IH QID


Pepcid (Famotidine) 20 Mg Tablet 20 Mg PO HS


Lovenox (Enoxaparin Sodium) 30 Mg/0.3 Ml Disp.syrin 30 Mg SQ DAILY


Vitamin D3 ** (Vitamin D) 125 Mcg Capsule 125 Mcg PO DAILY


     5,000 UNITS = 125 MCG


Zyrtec (Cetirizine Hcl) 10 Mg Tablet 1 Tab PO DAILY


Ascorbic Acid 500 Mg Tablet 2 Mg PO DAILY


Acetaminophen 325 Mg Tablet 2 Tab PO PRN Q6HRS PRN 30 Days


No Known Medications Prior To Admisstion (Info)  Each 1 Each 


Vitals/I & O





Vital Sign - Last 24 Hours








 8/19/21 8/19/21 8/19/21 8/19/21





 11:30 11:30 13:02 16:15


 


Temp 98.0 98.0  





 98.0 98.0  


 


Pulse  91  


 


Resp  18  


 


B/P (MAP)  88/47 (61)  


 


Pulse Ox  95 90 94


 


O2 Delivery   VAPOTHERM VAPOTHERM


 


O2 Flow Rate   40.0 40.0


 


    





    





 8/19/21 8/19/21 8/19/21 8/19/21





 17:00 19:00 20:00 21:05


 


Temp 97.8 97.6  





 97.8 97.6  


 


Pulse  115  


 


Resp 19 21  


 


B/P (MAP)  124/77 (93)  


 


Pulse Ox 94 93  95


 


O2 Delivery  vapotherm Vapotherm VAPOTHERM


 


O2 Flow Rate  40.0 40.0 40.0


 


    





    





 8/20/21 8/20/21 8/20/21 8/20/21





 00:50 03:22 05:49 07:00


 


Temp    98.0





    98.0


 


Pulse    96


 


Resp    18


 


B/P (MAP)    117/70 (86)


 


Pulse Ox 97 95 94 93


 


O2 Delivery VAPOTHERM VAPOTHERM VAPOTHERM vapotherm


 


O2 Flow Rate 40.0 40.0 40.0 40.0


 


    





    





 8/20/21   





 07:32   


 


Pulse Ox 94   


 


O2 Delivery VAPOTHERM   


 


O2 Flow Rate 40.0   














Intake and Output   


 


 8/19/21 8/19/21 8/20/21





 15:00 23:00 07:00


 


Output Total 400 ml 400 ml 800 ml


 


Balance -400 ml -400 ml -800 ml











Justicifation of Admission Dx:


Justifications for Admission:


Justification of Admission Dx:  Yes


Sepsis:  Hypoxemia











EMMA LORD MD          Aug 20, 2021 09:11 no

## 2021-08-21 VITALS — DIASTOLIC BLOOD PRESSURE: 79 MMHG | SYSTOLIC BLOOD PRESSURE: 135 MMHG

## 2021-08-21 VITALS — SYSTOLIC BLOOD PRESSURE: 113 MMHG | DIASTOLIC BLOOD PRESSURE: 75 MMHG

## 2021-08-21 VITALS — DIASTOLIC BLOOD PRESSURE: 60 MMHG | SYSTOLIC BLOOD PRESSURE: 104 MMHG

## 2021-08-21 VITALS — DIASTOLIC BLOOD PRESSURE: 64 MMHG | SYSTOLIC BLOOD PRESSURE: 114 MMHG

## 2021-08-21 VITALS — DIASTOLIC BLOOD PRESSURE: 80 MMHG | SYSTOLIC BLOOD PRESSURE: 135 MMHG

## 2021-08-21 LAB
ALBUMIN SERPL-MCNC: 2.3 G/DL (ref 3.4–5)
ALBUMIN/GLOB SERPL: 0.8 {RATIO} (ref 1–1.7)
ALP SERPL-CCNC: 63 U/L (ref 46–116)
ALT SERPL-CCNC: 101 U/L (ref 16–63)
ANION GAP SERPL CALC-SCNC: 5 MMOL/L (ref 6–14)
AST SERPL-CCNC: 25 U/L (ref 15–37)
BASOPHILS # BLD AUTO: 0 X10^3/UL (ref 0–0.2)
BASOPHILS NFR BLD: 0 % (ref 0–3)
BILIRUB SERPL-MCNC: 0.3 MG/DL (ref 0.2–1)
BUN SERPL-MCNC: 20 MG/DL (ref 8–26)
BUN/CREAT SERPL: 25 (ref 6–20)
CALCIUM SERPL-MCNC: 8.1 MG/DL (ref 8.5–10.1)
CHLORIDE SERPL-SCNC: 105 MMOL/L (ref 98–107)
CO2 SERPL-SCNC: 30 MMOL/L (ref 21–32)
CREAT SERPL-MCNC: 0.8 MG/DL (ref 0.7–1.3)
EOSINOPHIL NFR BLD: 0.2 X10^3/UL (ref 0–0.7)
EOSINOPHIL NFR BLD: 2 % (ref 0–3)
ERYTHROCYTE [DISTWIDTH] IN BLOOD BY AUTOMATED COUNT: 13.3 % (ref 11.5–14.5)
GFR SERPLBLD BASED ON 1.73 SQ M-ARVRAT: 100 ML/MIN
GLUCOSE SERPL-MCNC: 95 MG/DL (ref 70–99)
HCT VFR BLD CALC: 40.1 % (ref 39–53)
HGB BLD-MCNC: 13.7 G/DL (ref 13–17.5)
LYMPHOCYTES # BLD: 1.2 X10^3/UL (ref 1–4.8)
LYMPHOCYTES NFR BLD AUTO: 15 % (ref 24–48)
MCH RBC QN AUTO: 32 PG (ref 25–35)
MCHC RBC AUTO-ENTMCNC: 34 G/DL (ref 31–37)
MCV RBC AUTO: 93 FL (ref 79–100)
MONO #: 0.3 X10^3/UL (ref 0–1.1)
MONOCYTES NFR BLD: 4 % (ref 0–9)
NEUT #: 6.3 X10^3/UL (ref 1.8–7.7)
NEUTROPHILS NFR BLD AUTO: 79 % (ref 31–73)
PLATELET # BLD AUTO: 103 X10^3/UL (ref 140–400)
POTASSIUM SERPL-SCNC: 3.8 MMOL/L (ref 3.5–5.1)
PROT SERPL-MCNC: 5.3 G/DL (ref 6.4–8.2)
RBC # BLD AUTO: 4.3 X10^6/UL (ref 4.3–5.7)
SODIUM SERPL-SCNC: 140 MMOL/L (ref 136–145)
WBC # BLD AUTO: 8 X10^3/UL (ref 4–11)

## 2021-08-21 RX ADMIN — CETIRIZINE HYDROCHLORIDE SCH MG: 10 TABLET, FILM COATED ORAL at 09:47

## 2021-08-21 RX ADMIN — IPRATROPIUM BROMIDE AND ALBUTEROL SCH PUFF: 20; 100 SPRAY, METERED RESPIRATORY (INHALATION) at 22:19

## 2021-08-21 RX ADMIN — IPRATROPIUM BROMIDE AND ALBUTEROL SCH PUFF: 20; 100 SPRAY, METERED RESPIRATORY (INHALATION) at 18:03

## 2021-08-21 RX ADMIN — Medication SCH MG: at 09:48

## 2021-08-21 RX ADMIN — ENOXAPARIN SODIUM SCH MG: 40 INJECTION SUBCUTANEOUS at 22:19

## 2021-08-21 RX ADMIN — FAMOTIDINE SCH MG: 20 TABLET ORAL at 22:18

## 2021-08-21 RX ADMIN — Medication SCH MG: at 09:47

## 2021-08-21 RX ADMIN — WATER PRN ML: 1 INJECTION INTRAVENOUS at 04:54

## 2021-08-21 RX ADMIN — IPRATROPIUM BROMIDE AND ALBUTEROL SCH PUFF: 20; 100 SPRAY, METERED RESPIRATORY (INHALATION) at 12:00

## 2021-08-21 RX ADMIN — IPRATROPIUM BROMIDE AND ALBUTEROL SCH PUFF: 20; 100 SPRAY, METERED RESPIRATORY (INHALATION) at 09:48

## 2021-08-21 RX ADMIN — ZINC SULFATE CAP 220 MG (50 MG ELEMENTAL ZN) SCH MG: 220 (50 ZN) CAP at 09:47

## 2021-08-21 RX ADMIN — CHOLECALCIFEROL CAP 125 MCG (5000 UNIT) SCH UNIT: 125 CAP at 09:47

## 2021-08-21 NOTE — PDOC
PROGRESS NOTES


Date of Service:


DATE: 8/21/21 


TIME: 10:17





Chief Complaint


Chief Complaint


SEPSIS, was POA


Severe malnutrition was POA, with obesity, BMI 32


Acute hypoxic respiratory failure


COVID-19 pneumonia r


transaminitis





History of Present Illness


History of Present Illness


Mr Read is a 56-year-old male with no significant past medical history except 

for degenerative joint disease who was transferred from [facility]  

with complaints of fever, headache, dry cough, shortness of breath for the past 

3 days, admitted on 7/26/2021.  Patient stated that he was actually exposed from

his son he was worsening and required to come to the ED at [facility] and he was

found to be hypoxic and required 2 L of nasal cannula oxygen.  Chest x-ray over 

there showed bilateral mild bibasilar infiltrates.  Covid test was positive and 

patient was transferred to [facility] for pulmonary evaluation and treatment.  Patient 

was started on Remdesivir and IV steroids.  Currently patient is requiring 15 L 

high flow nasal cannula and IV steroids.





7/30: No acute events overnight.  Patient was saturating 92% on 15 L nasal 

cannula and he had desatted down to 70% after he had to blow his nose.  Patient 

was placed on BiPAP at 100% FiO2.  Chest leg appears to be worsening.


7/31: Last dose of remdesivir. Could not tolerate vapotherm and is now on BIPAP.


8/1:  poor Po intake, dry,   dark urine


8/2: Desaturates to 72% when he takes off his BiPAP.  Advised to get back on 

BIPAP after eating. he is able to place himself back on. Frustrated with the 

severity of his hypoxia.


8/3: Status post remdesivir. Still requiring BiPAP 75% FiO2 with significant 

desaturations when he is not wearing assessment with nonrebreather O2.


8/4: Still with significant desaturations when he raises BiPAP even on 15 L high

flow nasal cannula.  Discussed transitioning back to Vapotherm if we can treat 

his anxiety.  He is amenable to this.  He is frustrated with staying in the 

hospital understands he may be a longer term COVID recovery and is amenable to 

LTACH referral.  I discussed with pulmonology.


8/5: Afebrile. 70% FiO2 on BiPAP 12/6.  Feels better.  Did not tolerated 

Vapotherm.  Significant desaturations when he transitions to high flow nasal 

cannula O2 while eating.  Discussed referral to LTACH with pulmonology and ID as

it appears he will need a longer COVID recovery.


8/6: Patient on 70% FiO2.  Via BiPAP 12/6 when seen by pulmonology. Feels better

and is seen on vapotherm 40l/min 100% FiO2 with saturations 89%, tolerating 

well.





8/8/2021


patient seen at edge of bed. FiO2 at 89%. Dropped to low 80s while talking to 

us. Educated patient on conserving energy and working to keep FiO2 higher.


D/W RN


Chart Reviewed





8/9/2021


Patient was seen and examined in bed. FiO2 of 87% while on Vapotherm with 40L of

90% O2.


D/W RN, Chart Review


Patient has not had a bowel movement in two days. 


Patient feels he is doing better but his FiO2 still drops with activity or 

speaking


Discussed need to rest and focus on breathing to maintain proper FiO2.





8/10/2021


Patient was resting in bed with NAD


Seen and examined in bed. FiO2 of 92% while on Vapotherm with 40L of 90% O2.


D/W RN


Chart Review


Discussed need to rest and focus on breathing to maintain proper FiO2





8/11/2021


Patient was awake, alert and NAD


Seen and examine in bed.


FiO2 92% while on Vapotherm with 40L of 90% O2


Discussed that we will continue to monitor for clinical improvement





8/12/2021


Patient resting upon entry into room. Upon exam in bed he was awake, alert and 

NAD


FiO2 90% while on Vapotherm with 40L of 90% O2


Discussed continued monitoring and that he is making slow improvements.


Patient was encouraged by improvements and eager to return to as high of 

function as possible.





8/13/2021


Patient awake, alert and on computer.


FiO2 92% while on Vapotherm with 40L of 90% O2


Continuing to make slow improvements


Discussed continuing treatment plan at this time





8/14/2021


Patient resting upon entry into room. Upon exam in bed he was awake, alert and 

NAD


FiO2 90% while on Vapotherm with 40L of 85% O2


We will continue to monitor as he is making slow improvements


Discharge plan will be created as he continues with current treatment





8/15/2021


Patient in prone position upon entry into room. States that he placed himself in

this position after he had read that it would help online. 


Upon exam in bed he was awake, alert and NAD


FiO2 90% while on Vapotherm with 40L of 85% O2


D/W RN, Chart review


We will continue to monitor and discharge disposition in process via social work








8/16/2021


Patient in prone position upon entry into room. States that he placed himself in

this position after he had read that it would help online. 


Upon exam in bed he was awake, alert and NAD


FiO2 90% while on Vapotherm with 40L of 85% O2


D/W RN, Chart review


We will continue to monitor and discharge disposition in process via social work


Patient remains on Vapotherm 40 L, 80%


afebrile 








8/17/2021


Patient in prone position upon entry into room. States that he placed himself in

this position after he had read that it would help online. 


Upon exam in bed he was awake, alert and NAD


FiO2 90% while on Vapotherm with 40L of 85% O2


D/W RN, Chart review


We will continue to monitor and discharge disposition in process via social work


Patient remains on Vapotherm 40 L, 80%


afebrile 


remdesivir/Tocilizumab  


PT/OT


DVT/GI PPX :lovenox 











8/18/2021





Upon exam in bed he was awake, alert and NAD


on Vapotherm with 40L of 94% O2


D/W RN, Chart review


We will continue to monitor and discharge disposition in process via social work


SYMPTOMS SLOW TO RESOLVE


afebrile 


remdesivir/Tocilizumab  


PT/OT


DVT/GI PPX :lovenox 


CXR 8-18 8/19/2021





Upon exam in bed he was awake, alert and NAD


 70%  on Vapotherm with 40L of 94% O2


D/W RN, Chart review


We will continue to monitor and discharge disposition in process via social work


SYMPTOMS SLOW TO RESOLVE


afebrile 


remdesivir/Tocilizumab  


PT/OT


DVT/GI PPX :lovenox 


CXR 8-18 8/20/2021





Upon exam in bed he was DOSING /NAD


 70%  on Vapotherm with 40L of 94% O2


D/W RN, Chart review


We will continue to monitor and discharge disposition in process via social work


SYMPTOMS SLOW TO RESOLVE


afebrile 


remdesivir/Tocilizumab  


PT/OT


DVT/GI PPX :lovenox 


CXR 8-18

















8/21/2021





RESTING IN BED


 70%  on Vapotherm with 40L of 94% O2


D/W RN, Chart review


We will continue to monitor and discharge disposition in process via social work


SYMPTOMS SLOW TO RESOLVE


afebrile 


remdesivir/Tocilizumab  


PT/OT


DVT/GI PPX :lovenox 


CXR 8-18


Once down to 60% FiO2 via Vapotherm, try nasal cannula


IS at bedside 


Continue steroids with taper, on p.o. prednisone now








D/W RN 











8/21/2021





RESTING IN BED


D/W RN, Chart review


We will continue to monitor and discharge disposition in process via social work


SYMPTOMS SLOW TO RESOLVE


afebrile 


remdesivir/Tocilizumab  


PT/OT


DVT/GI PPX :lovenox 


CXR 8-18


IS at bedside 


Continue steroids with taper, on p.o. prednisone now


D/W RN 


currently on 23 lpm and 21% fio2  wean as tolerated,  improving  try NC


IS FREQ PER HOUR





Vitals


Vitals





Vital Signs








  Date Time  Temp Pulse Resp B/P (MAP) Pulse Ox O2 Delivery O2 Flow Rate FiO2


 


8/21/21 07:33     96 Nasal Cannula 6.0 


 


8/21/21 07:00 97.6 94 20 114/64 (81)    





 97.6       











Physical Exam


Physical Exam








GEN axo3 male on BIPAP


HEENT:  Normocephalic, atraumatic.  Anicteric. 


NECK:  Supple, no JVD.


LUNGS:  Crackles bilaterally.   


HEART:  S1, S2.  


ABDOMEN:  Soft, nontender, nondistended.  Bowel sounds present.


EXTREMITIES:  No edema, no cyanosis.


DERMATOLOGIC:  Warm, dry, no generalized rash.


NEUROLOGIC:  Alert, oriented, grossly nonfocal.


PSYCHIATRIC:  Calm and cooperative. 


 PIV looks clean.


General:  Alert, Oriented X3, Cooperative, No acute distress


Heart:  Regular rate (tele reg,  reviewed), Normal S1, Normal S2


Lungs:  Clear


Abdomen:  Normal bowel sounds, Soft, No tenderness


Extremities:  No clubbing, No cyanosis, No edema


Skin:  No rashes





Labs


LABS





Laboratory Tests








Test


 8/21/21


04:00


 


White Blood Count


 8.0 x10^3/uL


(4.0-11.0)


 


Red Blood Count


 4.30 x10^6/uL


(4.30-5.70)


 


Hemoglobin


 13.7 g/dL


(13.0-17.5)


 


Hematocrit


 40.1 %


(39.0-53.0)


 


Mean Corpuscular Volume 93 fL () 


 


Mean Corpuscular Hemoglobin 32 pg (25-35) 


 


Mean Corpuscular Hemoglobin


Concent 34 g/dL


(31-37)


 


Red Cell Distribution Width


 13.3 %


(11.5-14.5)


 


Platelet Count


 103 x10^3/uL


(140-400)


 


Neutrophils (%) (Auto) 79 % (31-73) 


 


Lymphocytes (%) (Auto) 15 % (24-48) 


 


Monocytes (%) (Auto) 4 % (0-9) 


 


Eosinophils (%) (Auto) 2 % (0-3) 


 


Basophils (%) (Auto) 0 % (0-3) 


 


Neutrophils # (Auto)


 6.3 x10^3/uL


(1.8-7.7)


 


Lymphocytes # (Auto)


 1.2 x10^3/uL


(1.0-4.8)


 


Monocytes # (Auto)


 0.3 x10^3/uL


(0.0-1.1)


 


Eosinophils # (Auto)


 0.2 x10^3/uL


(0.0-0.7)


 


Basophils # (Auto)


 0.0 x10^3/uL


(0.0-0.2)


 


Sodium Level


 140 mmol/L


(136-145)


 


Potassium Level


 3.8 mmol/L


(3.5-5.1)


 


Chloride Level


 105 mmol/L


()


 


Carbon Dioxide Level


 30 mmol/L


(21-32)


 


Anion Gap 5 (6-14) 


 


Blood Urea Nitrogen


 20 mg/dL


(8-26)


 


Creatinine


 0.8 mg/dL


(0.7-1.3)


 


Estimated GFR


(Cockcroft-Gault) 100.0 





 


BUN/Creatinine Ratio 25 (6-20) 


 


Glucose Level


 95 mg/dL


(70-99)


 


Calcium Level


 8.1 mg/dL


(8.5-10.1)


 


Ferritin


 1315 ng/mL


()


 


Total Bilirubin


 0.3 mg/dL


(0.2-1.0)


 


Aspartate Amino Transf


(AST/SGOT) 25 U/L (15-37) 





 


Alanine Aminotransferase


(ALT/SGPT) 101 U/L


(16-63)


 


Alkaline Phosphatase


 63 U/L


()


 


Total Protein


 5.3 g/dL


(6.4-8.2)


 


Albumin


 2.3 g/dL


(3.4-5.0)


 


Albumin/Globulin Ratio 0.8 (1.0-1.7) 











Comment


Review of Relevant


I have reviewed the following items genesis (where applicable) has been applied.


Labs





Laboratory Tests








Test


 8/21/21


04:00


 


White Blood Count


 8.0 x10^3/uL


(4.0-11.0)


 


Red Blood Count


 4.30 x10^6/uL


(4.30-5.70)


 


Hemoglobin


 13.7 g/dL


(13.0-17.5)


 


Hematocrit


 40.1 %


(39.0-53.0)


 


Mean Corpuscular Volume 93 fL () 


 


Mean Corpuscular Hemoglobin 32 pg (25-35) 


 


Mean Corpuscular Hemoglobin


Concent 34 g/dL


(31-37)


 


Red Cell Distribution Width


 13.3 %


(11.5-14.5)


 


Platelet Count


 103 x10^3/uL


(140-400)


 


Neutrophils (%) (Auto) 79 % (31-73) 


 


Lymphocytes (%) (Auto) 15 % (24-48) 


 


Monocytes (%) (Auto) 4 % (0-9) 


 


Eosinophils (%) (Auto) 2 % (0-3) 


 


Basophils (%) (Auto) 0 % (0-3) 


 


Neutrophils # (Auto)


 6.3 x10^3/uL


(1.8-7.7)


 


Lymphocytes # (Auto)


 1.2 x10^3/uL


(1.0-4.8)


 


Monocytes # (Auto)


 0.3 x10^3/uL


(0.0-1.1)


 


Eosinophils # (Auto)


 0.2 x10^3/uL


(0.0-0.7)


 


Basophils # (Auto)


 0.0 x10^3/uL


(0.0-0.2)


 


Sodium Level


 140 mmol/L


(136-145)


 


Potassium Level


 3.8 mmol/L


(3.5-5.1)


 


Chloride Level


 105 mmol/L


()


 


Carbon Dioxide Level


 30 mmol/L


(21-32)


 


Anion Gap 5 (6-14) 


 


Blood Urea Nitrogen


 20 mg/dL


(8-26)


 


Creatinine


 0.8 mg/dL


(0.7-1.3)


 


Estimated GFR


(Cockcroft-Gault) 100.0 





 


BUN/Creatinine Ratio 25 (6-20) 


 


Glucose Level


 95 mg/dL


(70-99)


 


Calcium Level


 8.1 mg/dL


(8.5-10.1)


 


Ferritin


 1315 ng/mL


()


 


Total Bilirubin


 0.3 mg/dL


(0.2-1.0)


 


Aspartate Amino Transf


(AST/SGOT) 25 U/L (15-37) 





 


Alanine Aminotransferase


(ALT/SGPT) 101 U/L


(16-63)


 


Alkaline Phosphatase


 63 U/L


()


 


Total Protein


 5.3 g/dL


(6.4-8.2)


 


Albumin


 2.3 g/dL


(3.4-5.0)


 


Albumin/Globulin Ratio 0.8 (1.0-1.7) 








Laboratory Tests








Test


 8/21/21


04:00


 


White Blood Count


 8.0 x10^3/uL


(4.0-11.0)


 


Red Blood Count


 4.30 x10^6/uL


(4.30-5.70)


 


Hemoglobin


 13.7 g/dL


(13.0-17.5)


 


Hematocrit


 40.1 %


(39.0-53.0)


 


Mean Corpuscular Volume 93 fL () 


 


Mean Corpuscular Hemoglobin 32 pg (25-35) 


 


Mean Corpuscular Hemoglobin


Concent 34 g/dL


(31-37)


 


Red Cell Distribution Width


 13.3 %


(11.5-14.5)


 


Platelet Count


 103 x10^3/uL


(140-400)


 


Neutrophils (%) (Auto) 79 % (31-73) 


 


Lymphocytes (%) (Auto) 15 % (24-48) 


 


Monocytes (%) (Auto) 4 % (0-9) 


 


Eosinophils (%) (Auto) 2 % (0-3) 


 


Basophils (%) (Auto) 0 % (0-3) 


 


Neutrophils # (Auto)


 6.3 x10^3/uL


(1.8-7.7)


 


Lymphocytes # (Auto)


 1.2 x10^3/uL


(1.0-4.8)


 


Monocytes # (Auto)


 0.3 x10^3/uL


(0.0-1.1)


 


Eosinophils # (Auto)


 0.2 x10^3/uL


(0.0-0.7)


 


Basophils # (Auto)


 0.0 x10^3/uL


(0.0-0.2)


 


Sodium Level


 140 mmol/L


(136-145)


 


Potassium Level


 3.8 mmol/L


(3.5-5.1)


 


Chloride Level


 105 mmol/L


()


 


Carbon Dioxide Level


 30 mmol/L


(21-32)


 


Anion Gap 5 (6-14) 


 


Blood Urea Nitrogen


 20 mg/dL


(8-26)


 


Creatinine


 0.8 mg/dL


(0.7-1.3)


 


Estimated GFR


(Cockcroft-Gault) 100.0 





 


BUN/Creatinine Ratio 25 (6-20) 


 


Glucose Level


 95 mg/dL


(70-99)


 


Calcium Level


 8.1 mg/dL


(8.5-10.1)


 


Ferritin


 1315 ng/mL


()


 


Total Bilirubin


 0.3 mg/dL


(0.2-1.0)


 


Aspartate Amino Transf


(AST/SGOT) 25 U/L (15-37) 





 


Alanine Aminotransferase


(ALT/SGPT) 101 U/L


(16-63)


 


Alkaline Phosphatase


 63 U/L


()


 


Total Protein


 5.3 g/dL


(6.4-8.2)


 


Albumin


 2.3 g/dL


(3.4-5.0)


 


Albumin/Globulin Ratio 0.8 (1.0-1.7) 








Medications





Current Medications


Acetaminophen (Tylenol) 650 mg PRN Q6HRS  PRN PO MILD PAIN / TEMP > 100.3'F Last

administered on 7/27/21at 20:09;  Start 7/27/21 at 19:00;  Stop 7/27/21 at 

20:15;  Status DC


Enoxaparin Sodium (Lovenox Per Pharmacy Prophylaxis Dosing) 1 each PRN DAILY  

PRN MC SEE COMMENTS;  Start 7/27/21 at 19:45


Enoxaparin Sodium (Lovenox 40mg Syringe) 40 mg Q24H SQ  Last administered on 

8/20/21at 20:58;  Start 7/27/21 at 20:00


Remdesivir 200 mg/ Sodium Chloride 210 ml @  210 mls/hr 1X  ONCE IV  Last 

administered on 7/27/21at 20:08;  Start 7/27/21 at 20:00;  Stop 7/27/21 at 

20:59;  Status DC


Remdesivir 100 mg/ Sodium Chloride 230 ml @  460 mls/hr Q24H IV  Last 

administered on 7/31/21at 21:20;  Start 7/28/21 at 20:00;  Stop 7/31/21 at 

20:29;  Status DC


Acetaminophen (Tylenol) 650 mg PRN Q6HRS  PRN PO MILD PAIN OR FEVER Last 

administered on 8/15/21at 21:38;  Start 7/27/21 at 19:45


Ascorbic Acid (Vitamin C) 1,000 mg DAILY PO  Last administered on 8/21/21at 

09:47;  Start 7/28/21 at 09:00


Cetirizine HCl (ZyrTEC) 10 mg DAILY PO  Last administered on 8/21/21at 09:47;  

Start 7/28/21 at 09:00


Vitamin D (Vitamin D3) 5,000 unit DAILY PO  Last administered on 8/21/21at 

09:47;  Start 7/28/21 at 09:00


Enoxaparin Sodium (Lovenox 30mg Syringe) 30 mg Q24H SQ ;  Start 7/27/21 at 

21:00;  Status Cancel


Famotidine (Pepcid) 20 mg HS PO  Last administered on 8/20/21at 20:58;  Start 

7/27/21 at 21:00


Non-Formulary Medication (Ipratropium/ Albuterol Sulfate (Combivent Respimat 

Inhal)) 2 inh QID IH ;  Start 7/27/21 at 21:00;  Status UNV


Methylprednisolone Sodium Succinate (SOLU-Medrol 125MG VIAL) 60 mg Q8HRS IV  Las

t administered on 8/6/21at 05:41;  Start 7/27/21 at 22:00;  Stop 8/6/21 at 

10:13;  Status DC


Morphine Sulfate (Morphine Sulfate) 2 mg PRN Q2HR  PRN IVP MODERATE TO SEVERE 

PAIN Last administered on 8/11/21at 22:56;  Start 7/27/21 at 20:30


Zinc Sulfate (Orazinc) 220 mg DAILY PO  Last administered on 8/21/21at 09:47;  

Start 7/28/21 at 09:00


Albuterol/ Ipratropium (Duoneb) 3 ml RTQID NEB ;  Start 7/27/21 at 21:00;  

Status Cancel


Albuterol/ Ipratropium (Combivent Respimat  Mcg) 2 puff RTQID INH  Last 

administered on 8/21/21at 09:48;  Start 7/27/21 at 21:00


Alprazolam (Xanax) 0.25 mg PRN Q8HRS  PRN PO ANXIETY / AGITATION Last admini

stered on 7/29/21at 08:59;  Start 7/28/21 at 15:15;  Stop 7/30/21 at 14:14;  

Status DC


Lorazepam (Ativan Inj) 1 mg PRN Q8HRS  PRN IVP ANXIETY / AGITATION Last 

administered on 7/29/21at 14:05;  Start 7/29/21 at 11:15;  Stop 7/30/21 at 

14:14;  Status DC


Thiamine Mononitrate (Vitamin B-1) 100 mg DAILY PO  Last administered on 

8/21/21at 09:48;  Start 7/30/21 at 09:00


Sterile Water (WATER for RESP) 1,000 ml CONT  PRN INH VIA VAPOTHERM DEVICE Last 

administered on 8/21/21at 04:54;  Start 7/29/21 at 19:15


Haloperidol Lactate (Haldol Inj) 5 mg PRN Q6HRS  PRN IVP AGITATION;  Start 

7/30/21 at 14:15


Insulin Human Lispro (HumaLOG) 0-7 UNITS TIDWMEALS SQ ;  Start 7/31/21 at 12:00;

 Stop 8/1/21 at 09:08;  Status DC


Dextrose (Dextrose 50%-Water Syringe) 12.5 gm PRN Q15MIN  PRN IV SEE COMMENTS;  

Start 7/31/21 at 10:15


Potassium Chloride/Dextrose/ Sod Cl 1,000 ml @  100 mls/hr Q10H IV  Last 

administered on 8/1/21at 16:54;  Start 7/31/21 at 10:15;  Stop 8/1/21 at 22:33; 

Status DC


Insulin Glargine (Lantus Syringe) 8 unit DAILY SQ ;  Start 8/1/21 at 09:00;  

Stop 8/1/21 at 08:50;  Status DC


Tocilizumab 800 mg/Sodium Chloride 100 ml @  100 mls/hr 1X  ONCE IV ;  Start 

8/2/21 at 09:00;  Stop 8/2/21 at 09:59;  Status Cancel


Methylprednisolone Sodium Succinate (SOLU-Medrol 40MG VIAL) 60 mg [id number] IV  Last

administered on 8/11/21at 05:45;  Start 8/6/21 at 18:00;  Stop 8/11/21 at 09:54;

 Status DC


Guaifenesin (Mucinex) 1,200 mg BID PO  Last administered on 8/21/21at 09:47;  

Start 8/8/21 at 21:00


Polyethylene Glycol (miraLAX PACKET) 17 gm PRN BID  PRN PO CONSTIPATION;  Start 

8/9/21 at 10:30


Methylprednisolone Sodium Succinate (SOLU-Medrol 125MG VIAL) 60 mg DAILY IV  

Last administered on 8/16/21at 08:53;  Start 8/12/21 at 09:00;  Stop 8/17/21 at 

08:34;  Status DC


Methylprednisolone Sodium Succinate (SOLU-Medrol 125MG VIAL) 40 mg DAILY IV  

Last administered on 8/18/21at 08:55;  Start 8/17/21 at 09:00;  Stop 8/18/21 at 

20:48;  Status DC


Prednisone (Prednisone) 30 mg DAILY PO  Last administered on 8/21/21at 09:47;  

Start 8/19/21 at 09:00





Active Scripts


Active


Reported


Morphine Sulfate 2 Mg/1 Ml Cartridge 2 Mg IV PRN Q4HRS PRN


Zinc Sulfate 50 Mg Tablet 220 Mg PO DAILY


Methylprednisolone Sod Succ 125 Mg Vial 60 Mg IJ Q8HRS


Combivent Respimat Inhal (Ipratropium/Albuterol Sulfate) 4 Gm Aer.w.adap 2 Inh 

IH QID


Pepcid (Famotidine) 20 Mg Tablet 20 Mg PO HS


Lovenox (Enoxaparin Sodium) 30 Mg/0.3 Ml Disp.syrin 30 Mg SQ DAILY


Vitamin D3 ** (Vitamin D) 125 Mcg Capsule 125 Mcg PO DAILY


     5,000 UNITS = 125 MCG


Zyrtec (Cetirizine Hcl) 10 Mg Tablet 1 Tab PO DAILY


Ascorbic Acid 500 Mg Tablet 2 Mg PO DAILY


Acetaminophen 325 Mg Tablet 2 Tab PO PRN Q6HRS PRN 30 Days


No Known Medications Prior To Admisstion (Info)  Each 1 Each 


Vitals/I & O





Vital Sign - Last 24 Hours








 8/20/21 8/20/21 8/20/21 8/20/21





 11:00 11:24 13:40 15:00


 


Temp 97.6   97.5





 97.6   97.5


 


Pulse 96   80


 


Resp 20   18


 


B/P (MAP) 122/80 (94)   126/80 (95)


 


Pulse Ox 91 94 92 90


 


O2 Delivery  VAPOTHERM VAPOTHERM 


 


O2 Flow Rate 40.0 40.0 40.0 40.0


 


    





    





 8/20/21 8/20/21 8/20/21 8/20/21





 15:45 17:59 19:40 19:43


 


Temp   98.2 





   98.2 


 


Pulse   96 


 


Resp   21 


 


B/P (MAP)   112/64 (80) 


 


Pulse Ox 93 93 94 


 


O2 Delivery VAPOTHERM VAPOTHERM vapotherm Vapotherm


 


O2 Flow Rate 40.0 40.0 40.0 40.0


 


    





    





 8/20/21 8/20/21 8/20/21 8/21/21





 20:39 22:12 23:52 02:00


 


Temp  97.5  





  97.5  


 


Pulse  95  


 


Resp  21  


 


B/P (MAP)  124/78 (93)  


 


Pulse Ox 95 92 96 95


 


O2 Delivery VAPOTHERM vapotherm VAPOTHERM VAPOTHERM


 


O2 Flow Rate 40.0 40.0 40.0 40.0


 


    





    





 8/21/21 8/21/21 8/21/21 8/21/21





 03:00 05:00 07:00 07:33


 


Temp   97.6 





   97.6 


 


Pulse 85  94 


 


Resp   20 


 


B/P (MAP)   114/64 (81) 


 


Pulse Ox 92 96 96 96


 


O2 Delivery vapotherm VAPOTHERM Nasal Cannula Nasal Cannula


 


O2 Flow Rate  40.0 6.0 6.0














Intake and Output   


 


 8/20/21 8/20/21 8/21/21





 15:00 23:00 07:00


 


Intake Total 965 ml  140 ml


 


Output Total 700 ml 400 ml 300 ml


 


Balance 265 ml -400 ml -160 ml











Justicifation of Admission Dx:


Justifications for Admission:


Justification of Admission Dx:  Yes


Sepsis:  Hypoxemia











EMMA LORD MD          Aug 21, 2021 10:18

## 2021-08-21 NOTE — PDOC
PULMONARY PROGRESS NOTES


DATE: 8/21/21 


TIME: 09:59


Subjective


Patient remains on Vapotherm 23 lpm  21% fi02


afebrile 


feels better  sob better.


Vitals





Vital Signs








  Date Time  Temp Pulse Resp B/P (MAP) Pulse Ox O2 Delivery O2 Flow Rate FiO2


 


8/21/21 07:33     96 Nasal Cannula 6.0 


 


8/21/21 07:00 97.6 94 20 114/64 (81)    





 97.6       








Comments


Pt. seen during covid -19 pandemic visual exam preformed 


no distress 


RRR


Vapotherm


no obvious rash or edema


General:  Alert, No acute distress


Labs





Laboratory Tests








Test


 8/21/21


04:00


 


White Blood Count


 8.0 x10^3/uL


(4.0-11.0)


 


Red Blood Count


 4.30 x10^6/uL


(4.30-5.70)


 


Hemoglobin


 13.7 g/dL


(13.0-17.5)


 


Hematocrit


 40.1 %


(39.0-53.0)


 


Mean Corpuscular Volume 93 fL () 


 


Mean Corpuscular Hemoglobin 32 pg (25-35) 


 


Mean Corpuscular Hemoglobin


Concent 34 g/dL


(31-37)


 


Red Cell Distribution Width


 13.3 %


(11.5-14.5)


 


Platelet Count


 103 x10^3/uL


(140-400)


 


Neutrophils (%) (Auto) 79 % (31-73) 


 


Lymphocytes (%) (Auto) 15 % (24-48) 


 


Monocytes (%) (Auto) 4 % (0-9) 


 


Eosinophils (%) (Auto) 2 % (0-3) 


 


Basophils (%) (Auto) 0 % (0-3) 


 


Neutrophils # (Auto)


 6.3 x10^3/uL


(1.8-7.7)


 


Lymphocytes # (Auto)


 1.2 x10^3/uL


(1.0-4.8)


 


Monocytes # (Auto)


 0.3 x10^3/uL


(0.0-1.1)


 


Eosinophils # (Auto)


 0.2 x10^3/uL


(0.0-0.7)


 


Basophils # (Auto)


 0.0 x10^3/uL


(0.0-0.2)


 


Sodium Level


 140 mmol/L


(136-145)


 


Potassium Level


 3.8 mmol/L


(3.5-5.1)


 


Chloride Level


 105 mmol/L


()


 


Carbon Dioxide Level


 30 mmol/L


(21-32)


 


Anion Gap 5 (6-14) 


 


Blood Urea Nitrogen


 20 mg/dL


(8-26)


 


Creatinine


 0.8 mg/dL


(0.7-1.3)


 


Estimated GFR


(Cockcroft-Gault) 100.0 





 


BUN/Creatinine Ratio 25 (6-20) 


 


Glucose Level


 95 mg/dL


(70-99)


 


Calcium Level


 8.1 mg/dL


(8.5-10.1)


 


Ferritin


 1315 ng/mL


()


 


Total Bilirubin


 0.3 mg/dL


(0.2-1.0)


 


Aspartate Amino Transf


(AST/SGOT) 25 U/L (15-37) 





 


Alanine Aminotransferase


(ALT/SGPT) 101 U/L


(16-63)


 


Alkaline Phosphatase


 63 U/L


()


 


Total Protein


 5.3 g/dL


(6.4-8.2)


 


Albumin


 2.3 g/dL


(3.4-5.0)


 


Albumin/Globulin Ratio 0.8 (1.0-1.7) 








Laboratory Tests








Test


 8/21/21


04:00


 


White Blood Count


 8.0 x10^3/uL


(4.0-11.0)


 


Red Blood Count


 4.30 x10^6/uL


(4.30-5.70)


 


Hemoglobin


 13.7 g/dL


(13.0-17.5)


 


Hematocrit


 40.1 %


(39.0-53.0)


 


Mean Corpuscular Volume 93 fL () 


 


Mean Corpuscular Hemoglobin 32 pg (25-35) 


 


Mean Corpuscular Hemoglobin


Concent 34 g/dL


(31-37)


 


Red Cell Distribution Width


 13.3 %


(11.5-14.5)


 


Platelet Count


 103 x10^3/uL


(140-400)


 


Neutrophils (%) (Auto) 79 % (31-73) 


 


Lymphocytes (%) (Auto) 15 % (24-48) 


 


Monocytes (%) (Auto) 4 % (0-9) 


 


Eosinophils (%) (Auto) 2 % (0-3) 


 


Basophils (%) (Auto) 0 % (0-3) 


 


Neutrophils # (Auto)


 6.3 x10^3/uL


(1.8-7.7)


 


Lymphocytes # (Auto)


 1.2 x10^3/uL


(1.0-4.8)


 


Monocytes # (Auto)


 0.3 x10^3/uL


(0.0-1.1)


 


Eosinophils # (Auto)


 0.2 x10^3/uL


(0.0-0.7)


 


Basophils # (Auto)


 0.0 x10^3/uL


(0.0-0.2)


 


Sodium Level


 140 mmol/L


(136-145)


 


Potassium Level


 3.8 mmol/L


(3.5-5.1)


 


Chloride Level


 105 mmol/L


()


 


Carbon Dioxide Level


 30 mmol/L


(21-32)


 


Anion Gap 5 (6-14) 


 


Blood Urea Nitrogen


 20 mg/dL


(8-26)


 


Creatinine


 0.8 mg/dL


(0.7-1.3)


 


Estimated GFR


(Cockcroft-Gault) 100.0 





 


BUN/Creatinine Ratio 25 (6-20) 


 


Glucose Level


 95 mg/dL


(70-99)


 


Calcium Level


 8.1 mg/dL


(8.5-10.1)


 


Ferritin


 1315 ng/mL


()


 


Total Bilirubin


 0.3 mg/dL


(0.2-1.0)


 


Aspartate Amino Transf


(AST/SGOT) 25 U/L (15-37) 





 


Alanine Aminotransferase


(ALT/SGPT) 101 U/L


(16-63)


 


Alkaline Phosphatase


 63 U/L


()


 


Total Protein


 5.3 g/dL


(6.4-8.2)


 


Albumin


 2.3 g/dL


(3.4-5.0)


 


Albumin/Globulin Ratio 0.8 (1.0-1.7) 








Medications





Active Scripts








 Medications  Dose


 Route/Sig


 Max Daily Dose Days Date Category Dose


Instructions


 


 Morphine Sulfate


 2 Mg/1 Ml


 Cartridge  2 Mg


 IV PRN Q4HRS PRN


   7/27/21 Reported 


 


 Zinc Sulfate 50


 Mg Tablet  220 Mg


 PO DAILY


   7/27/21 Reported 


 


 Methylprednisolone


 Sod Succ 125 Mg


 Vial  60 Mg


 IJ Q8HRS


   7/27/21 Reported 


 


 Combivent


 Respimat Inhal


  (Ipratropium/Albuterol


 Sulfate) 4 Gm


 Aer.w.adap  2 Inh


 IH QID


   7/27/21 Reported 


 


 Pepcid


  (Famotidine) 20


 Mg Tablet  20 Mg


 PO HS


   7/27/21 Reported 


 


 Lovenox


  (Enoxaparin


 Sodium) 30 Mg/0.3


 Ml Disp.syrin  30 Mg


 SQ DAILY


   7/27/21 Reported 


 


 Vitamin D3 **


  (Vitamin D) 125


 Mcg Capsule  125 Mcg


 PO DAILY


   7/27/21 Reported  5,000 UNITS = 125 MCG


 


 Zyrtec


  (Cetirizine Hcl)


 10 Mg Tablet  1 Tab


 PO DAILY


   7/27/21 Reported 


 


 Ascorbic Acid 500


 Mg Tablet  2 Mg


 PO DAILY


   7/27/21 Reported 


 


 Acetaminophen 325


 Mg Tablet  2 Tab


 PO PRN Q6HRS PRN


  30 7/27/21 Reported 


 


 No Known


 Medications Prior


 To Admisstion


  (Info)  Each  1 Each


 


   8/25/20 Reported 











Impression


.


IMPRESSION:


1.  Acute hypoxemic respiratory failure secondary to COVID-19 viral 

pneumonia./Acute lung injury and early ARDS.  Slowly improving


2.  COVID-19 viral pneumonia.


3.  Abnormal chest x-ray, compatible with viral pneumonia.


4.  Nonspecific anxiety, possible clinical depression





Plan


.





Continue supplemental oxygen to keep sats above 90% currently on Vapotherm, 

currently on 23 lpm and 21% fio2  will slowly wean as tolerated, slowly 

improving  try NC


IS to use multiple times an hr


Continue steroids with taper, on p.o. prednisone now w taper 


S/P remdesivir/Tocilizumab  


PT/OT


DVT/GI PPX :lovenox 


D/W RN and RT











GONZALO WALKER MD               Aug 21, 2021 10:01

## 2021-08-22 VITALS — DIASTOLIC BLOOD PRESSURE: 57 MMHG | SYSTOLIC BLOOD PRESSURE: 102 MMHG

## 2021-08-22 VITALS — DIASTOLIC BLOOD PRESSURE: 74 MMHG | SYSTOLIC BLOOD PRESSURE: 113 MMHG

## 2021-08-22 VITALS — DIASTOLIC BLOOD PRESSURE: 72 MMHG | SYSTOLIC BLOOD PRESSURE: 114 MMHG

## 2021-08-22 VITALS — DIASTOLIC BLOOD PRESSURE: 71 MMHG | SYSTOLIC BLOOD PRESSURE: 133 MMHG

## 2021-08-22 VITALS — DIASTOLIC BLOOD PRESSURE: 56 MMHG | SYSTOLIC BLOOD PRESSURE: 115 MMHG

## 2021-08-22 VITALS — DIASTOLIC BLOOD PRESSURE: 78 MMHG | SYSTOLIC BLOOD PRESSURE: 121 MMHG

## 2021-08-22 RX ADMIN — Medication SCH MG: at 09:02

## 2021-08-22 RX ADMIN — IPRATROPIUM BROMIDE AND ALBUTEROL SCH PUFF: 20; 100 SPRAY, METERED RESPIRATORY (INHALATION) at 12:00

## 2021-08-22 RX ADMIN — ZINC SULFATE CAP 220 MG (50 MG ELEMENTAL ZN) SCH MG: 220 (50 ZN) CAP at 09:03

## 2021-08-22 RX ADMIN — ENOXAPARIN SODIUM SCH MG: 40 INJECTION SUBCUTANEOUS at 22:26

## 2021-08-22 RX ADMIN — Medication SCH MG: at 09:03

## 2021-08-22 RX ADMIN — CETIRIZINE HYDROCHLORIDE SCH MG: 10 TABLET, FILM COATED ORAL at 09:03

## 2021-08-22 RX ADMIN — CHOLECALCIFEROL CAP 125 MCG (5000 UNIT) SCH UNIT: 125 CAP at 09:03

## 2021-08-22 RX ADMIN — FAMOTIDINE SCH MG: 20 TABLET ORAL at 22:25

## 2021-08-22 RX ADMIN — IPRATROPIUM BROMIDE AND ALBUTEROL SCH PUFF: 20; 100 SPRAY, METERED RESPIRATORY (INHALATION) at 20:00

## 2021-08-22 RX ADMIN — IPRATROPIUM BROMIDE AND ALBUTEROL SCH PUFF: 20; 100 SPRAY, METERED RESPIRATORY (INHALATION) at 08:00

## 2021-08-22 RX ADMIN — IPRATROPIUM BROMIDE AND ALBUTEROL SCH PUFF: 20; 100 SPRAY, METERED RESPIRATORY (INHALATION) at 16:00

## 2021-08-22 NOTE — PDOC
PROGRESS NOTES


Date of Service:


DATE: 8/22/21 


TIME: 10:36





Chief Complaint


Chief Complaint


SEPSIS, was POA


Severe malnutrition was POA, with obesity, BMI 32


Acute hypoxic respiratory failure


COVID-19 pneumonia r


transaminitis





History of Present Illness


History of Present Illness


Mr Read is a 56-year-old male with no significant past medical history except 

for degenerative joint disease who was transferred from [facility]  

with complaints of fever, headache, dry cough, shortness of breath for the past 

3 days, admitted on 7/26/2021.  Patient stated that he was actually exposed from

his son he was worsening and required to come to the ED at [facility] and he was

found to be hypoxic and required 2 L of nasal cannula oxygen.  Chest x-ray over 

there showed bilateral mild bibasilar infiltrates.  Covid test was positive and 

patient was transferred to [facility] for pulmonary evaluation and treatment.  Patient 

was started on Remdesivir and IV steroids.  Currently patient is requiring 15 L 

high flow nasal cannula and IV steroids.





7/30: No acute events overnight.  Patient was saturating 92% on 15 L nasal 

cannula and he had desatted down to 70% after he had to blow his nose.  Patient 

was placed on BiPAP at 100% FiO2.  Chest leg appears to be worsening.


7/31: Last dose of remdesivir. Could not tolerate vapotherm and is now on BIPAP.


8/1:  poor Po intake, dry,   dark urine


8/2: Desaturates to 72% when he takes off his BiPAP.  Advised to get back on 

BIPAP after eating. he is able to place himself back on. Frustrated with the 

severity of his hypoxia.


8/3: Status post remdesivir. Still requiring BiPAP 75% FiO2 with significant 

desaturations when he is not wearing assessment with nonrebreather O2.


8/4: Still with significant desaturations when he raises BiPAP even on 15 L high

flow nasal cannula.  Discussed transitioning back to Vapotherm if we can treat 

his anxiety.  He is amenable to this.  He is frustrated with staying in the 

hospital understands he may be a longer term COVID recovery and is amenable to 

LTACH referral.  I discussed with pulmonology.


8/5: Afebrile. 70% FiO2 on BiPAP 12/6.  Feels better.  Did not tolerated 

Vapotherm.  Significant desaturations when he transitions to high flow nasal 

cannula O2 while eating.  Discussed referral to LTACH with pulmonology and ID as

it appears he will need a longer COVID recovery.


8/6: Patient on 70% FiO2.  Via BiPAP 12/6 when seen by pulmonology. Feels better

and is seen on vapotherm 40l/min 100% FiO2 with saturations 89%, tolerating 

well.





8/8/2021


patient seen at edge of bed. FiO2 at 89%. Dropped to low 80s while talking to 

us. Educated patient on conserving energy and working to keep FiO2 higher.


D/W RN


Chart Reviewed





8/9/2021


Patient was seen and examined in bed. FiO2 of 87% while on Vapotherm with 40L of

90% O2.


D/W RN, Chart Review


Patient has not had a bowel movement in two days. 


Patient feels he is doing better but his FiO2 still drops with activity or 

speaking


Discussed need to rest and focus on breathing to maintain proper FiO2.





8/10/2021


Patient was resting in bed with NAD


Seen and examined in bed. FiO2 of 92% while on Vapotherm with 40L of 90% O2.


D/W RN


Chart Review


Discussed need to rest and focus on breathing to maintain proper FiO2





8/11/2021


Patient was awake, alert and NAD


Seen and examine in bed.


FiO2 92% while on Vapotherm with 40L of 90% O2


Discussed that we will continue to monitor for clinical improvement





8/12/2021


Patient resting upon entry into room. Upon exam in bed he was awake, alert and 

NAD


FiO2 90% while on Vapotherm with 40L of 90% O2


Discussed continued monitoring and that he is making slow improvements.


Patient was encouraged by improvements and eager to return to as high of 

function as possible.





8/13/2021


Patient awake, alert and on computer.


FiO2 92% while on Vapotherm with 40L of 90% O2


Continuing to make slow improvements


Discussed continuing treatment plan at this time





8/14/2021


Patient resting upon entry into room. Upon exam in bed he was awake, alert and 

NAD


FiO2 90% while on Vapotherm with 40L of 85% O2


We will continue to monitor as he is making slow improvements


Discharge plan will be created as he continues with current treatment





8/15/2021


Patient in prone position upon entry into room. States that he placed himself in

this position after he had read that it would help online. 


Upon exam in bed he was awake, alert and NAD


FiO2 90% while on Vapotherm with 40L of 85% O2


D/W RN, Chart review


We will continue to monitor and discharge disposition in process via social work








8/16/2021


Patient in prone position upon entry into room. States that he placed himself in

this position after he had read that it would help online. 


Upon exam in bed he was awake, alert and NAD


FiO2 90% while on Vapotherm with 40L of 85% O2


D/W RN, Chart review


We will continue to monitor and discharge disposition in process via social work


Patient remains on Vapotherm 40 L, 80%


afebrile 








8/17/2021


Patient in prone position upon entry into room. States that he placed himself in

this position after he had read that it would help online. 


Upon exam in bed he was awake, alert and NAD


FiO2 90% while on Vapotherm with 40L of 85% O2


D/W RN, Chart review


We will continue to monitor and discharge disposition in process via social work


Patient remains on Vapotherm 40 L, 80%


afebrile 


remdesivir/Tocilizumab  


PT/OT


DVT/GI PPX :lovenox 











8/18/2021





Upon exam in bed he was awake, alert and NAD


on Vapotherm with 40L of 94% O2


D/W RN, Chart review


We will continue to monitor and discharge disposition in process via social work


SYMPTOMS SLOW TO RESOLVE


afebrile 


remdesivir/Tocilizumab  


PT/OT


DVT/GI PPX :lovenox 


CXR 8-18 8/19/2021





Upon exam in bed he was awake, alert and NAD


 70%  on Vapotherm with 40L of 94% O2


D/W RN, Chart review


We will continue to monitor and discharge disposition in process via social work


SYMPTOMS SLOW TO RESOLVE


afebrile 


remdesivir/Tocilizumab  


PT/OT


DVT/GI PPX :lovenox 


CXR 8-18 8/20/2021





Upon exam in bed he was DOSING /NAD


 70%  on Vapotherm with 40L of 94% O2


D/W RN, Chart review


We will continue to monitor and discharge disposition in process via social work


SYMPTOMS SLOW TO RESOLVE


afebrile 


remdesivir/Tocilizumab  


PT/OT


DVT/GI PPX :lovenox 


CXR 8-18

















8/21/2021





RESTING IN BED


 70%  on Vapotherm with 40L of 94% O2


D/W RN, Chart review


We will continue to monitor and discharge disposition in process via social work


SYMPTOMS SLOW TO RESOLVE


afebrile 


remdesivir/Tocilizumab  


PT/OT


DVT/GI PPX :lovenox 


CXR 8-18


Once down to 60% FiO2 via Vapotherm, try nasal cannula


IS at bedside 


Continue steroids with taper, on p.o. prednisone now








D/W RN 











8/21/2021





RESTING IN BED


D/W RN, Chart review


We will continue to monitor and discharge disposition in process via social work


SYMPTOMS SLOW TO RESOLVE


afebrile 


remdesivir/Tocilizumab  


PT/OT


DVT/GI PPX :lovenox 


CXR 8-18


IS at bedside 


Continue steroids with taper, on p.o. prednisone now


D/W RN 


currently on 23 lpm and 21% fio2  wean as tolerated,  improving  try NC


IS FREQ PER HOUR














8/22/2021





D/W RN, Chart review


We will continue to monitor and discharge disposition in process via social work


SYMPTOMS SLOW TO RESOLVE 


afebrile 


remdesivir/Tocilizumab  


PT/OT


DVT/GI PPX :lovenox 


CXR 8-18


IS at bedside 


Continue steroids with taper, on p.o. prednisone now


D/W RN 


 improving  try 6 liters  NC


IS FREQ PER HOUR





Vitals


Vitals





Vital Signs








  Date Time  Temp Pulse Resp B/P (MAP) Pulse Ox O2 Delivery O2 Flow Rate FiO2


 


8/22/21 08:00      Nasal Cannula 6.0 


 


8/22/21 06:28 98.5 82 18 114/72 (86) 97   





 98.5       











Physical Exam


Physical Exam








GEN axo3 male on BIPAP


HEENT:  Normocephalic, atraumatic.  Anicteric. 


NECK:  Supple, no JVD.


LUNGS:  Crackles bilaterally.   


HEART:  S1, S2.  


ABDOMEN:  Soft, nontender, nondistended.  Bowel sounds present.


EXTREMITIES:  No edema, no cyanosis.


DERMATOLOGIC:  Warm, dry, no generalized rash.


NEUROLOGIC:  Alert, oriented, grossly nonfocal.


PSYCHIATRIC:  Calm and cooperative. 


 PIV looks clean.


General:  Alert, Oriented X3, Cooperative, No acute distress


Heart:  Regular rate (tele reg,  reviewed), Normal S1, Normal S2


Lungs:  Clear


Abdomen:  Normal bowel sounds, Soft, No tenderness


Extremities:  No clubbing, No cyanosis, No edema


Skin:  No rashes





Comment


Review of Relevant


I have reviewed the following items genesis (where applicable) has been applied.


Labs





Laboratory Tests








Test


 8/21/21


04:00


 


White Blood Count


 8.0 x10^3/uL


(4.0-11.0)


 


Red Blood Count


 4.30 x10^6/uL


(4.30-5.70)


 


Hemoglobin


 13.7 g/dL


(13.0-17.5)


 


Hematocrit


 40.1 %


(39.0-53.0)


 


Mean Corpuscular Volume 93 fL () 


 


Mean Corpuscular Hemoglobin 32 pg (25-35) 


 


Mean Corpuscular Hemoglobin


Concent 34 g/dL


(31-37)


 


Red Cell Distribution Width


 13.3 %


(11.5-14.5)


 


Platelet Count


 103 x10^3/uL


(140-400)


 


Neutrophils (%) (Auto) 79 % (31-73) 


 


Lymphocytes (%) (Auto) 15 % (24-48) 


 


Monocytes (%) (Auto) 4 % (0-9) 


 


Eosinophils (%) (Auto) 2 % (0-3) 


 


Basophils (%) (Auto) 0 % (0-3) 


 


Neutrophils # (Auto)


 6.3 x10^3/uL


(1.8-7.7)


 


Lymphocytes # (Auto)


 1.2 x10^3/uL


(1.0-4.8)


 


Monocytes # (Auto)


 0.3 x10^3/uL


(0.0-1.1)


 


Eosinophils # (Auto)


 0.2 x10^3/uL


(0.0-0.7)


 


Basophils # (Auto)


 0.0 x10^3/uL


(0.0-0.2)


 


Sodium Level


 140 mmol/L


(136-145)


 


Potassium Level


 3.8 mmol/L


(3.5-5.1)


 


Chloride Level


 105 mmol/L


()


 


Carbon Dioxide Level


 30 mmol/L


(21-32)


 


Anion Gap 5 (6-14) 


 


Blood Urea Nitrogen


 20 mg/dL


(8-26)


 


Creatinine


 0.8 mg/dL


(0.7-1.3)


 


Estimated GFR


(Cockcroft-Gault) 100.0 





 


BUN/Creatinine Ratio 25 (6-20) 


 


Glucose Level


 95 mg/dL


(70-99)


 


Calcium Level


 8.1 mg/dL


(8.5-10.1)


 


Ferritin


 1315 ng/mL


()


 


Total Bilirubin


 0.3 mg/dL


(0.2-1.0)


 


Aspartate Amino Transf


(AST/SGOT) 25 U/L (15-37) 





 


Alanine Aminotransferase


(ALT/SGPT) 101 U/L


(16-63)


 


Alkaline Phosphatase


 63 U/L


()


 


Total Protein


 5.3 g/dL


(6.4-8.2)


 


Albumin


 2.3 g/dL


(3.4-5.0)


 


Albumin/Globulin Ratio 0.8 (1.0-1.7) 








Medications





Current Medications


Acetaminophen (Tylenol) 650 mg PRN Q6HRS  PRN PO MILD PAIN / TEMP > 100.3'F Last

administered on 7/27/21at 20:09;  Start 7/27/21 at 19:00;  Stop 7/27/21 at 

20:15;  Status DC


Enoxaparin Sodium (Lovenox Per Pharmacy Prophylaxis Dosing) 1 each PRN DAILY  

PRN MC SEE COMMENTS;  Start 7/27/21 at 19:45


Enoxaparin Sodium (Lovenox 40mg Syringe) 40 mg Q24H SQ  Last administered on 

8/21/21at 22:19;  Start 7/27/21 at 20:00


Remdesivir 200 mg/ Sodium Chloride 210 ml @  210 mls/hr 1X  ONCE IV  Last 

administered on 7/27/21at 20:08;  Start 7/27/21 at 20:00;  Stop 7/27/21 at 

20:59;  Status DC


Remdesivir 100 mg/ Sodium Chloride 230 ml @  460 mls/hr Q24H IV  Last 

administered on 7/31/21at 21:20;  Start 7/28/21 at 20:00;  Stop 7/31/21 at 

20:29;  Status DC


Acetaminophen (Tylenol) 650 mg PRN Q6HRS  PRN PO MILD PAIN OR FEVER Last 

administered on 8/15/21at 21:38;  Start 7/27/21 at 19:45


Ascorbic Acid (Vitamin C) 1,000 mg DAILY PO  Last administered on 8/22/21at 

09:02;  Start 7/28/21 at 09:00


Cetirizine HCl (ZyrTEC) 10 mg DAILY PO  Last administered on 8/22/21at 09:03;  

Start 7/28/21 at 09:00


Vitamin D (Vitamin D3) 5,000 unit DAILY PO  Last administered on 8/22/21at 

09:03;  Start 7/28/21 at 09:00


Enoxaparin Sodium (Lovenox 30mg Syringe) 30 mg Q24H SQ ;  Start 7/27/21 at 

21:00;  Status Cancel


Famotidine (Pepcid) 20 mg HS PO  Last administered on 8/21/21at 22:18;  Start 

7/27/21 at 21:00


Non-Formulary Medication (Ipratropium/ Albuterol Sulfate (Combivent Respimat 

Inhal)) 2 inh QID IH ;  Start 7/27/21 at 21:00;  Status UNV


Methylprednisolone Sodium Succinate (SOLU-Medrol 125MG VIAL) 60 mg Q8HRS IV  

Last administered on 8/6/21at 05:41;  Start 7/27/21 at 22:00;  Stop 8/6/21 at 

10:13;  Status DC


Morphine Sulfate (Morphine Sulfate) 2 mg PRN Q2HR  PRN IVP MODERATE TO SEVERE 

PAIN Last administered on 8/11/21at 22:56;  Start 7/27/21 at 20:30


Zinc Sulfate (Orazinc) 220 mg DAILY PO  Last administered on 8/22/21at 09:03;  

Start 7/28/21 at 09:00


Albuterol/ Ipratropium (Duoneb) 3 ml RTQID NEB ;  Start 7/27/21 at 21:00;  

Status Cancel


Albuterol/ Ipratropium (Combivent Respimat  Mcg) 2 puff RTQID INH  Last 

administered on 8/21/21at 22:19;  Start 7/27/21 at 21:00


Alprazolam (Xanax) 0.25 mg PRN Q8HRS  PRN PO ANXIETY / AGITATION Last 

administered on 7/29/21at 08:59;  Start 7/28/21 at 15:15;  Stop 7/30/21 at 

14:14;  Status DC


Lorazepam (Ativan Inj) 1 mg PRN Q8HRS  PRN IVP ANXIETY / AGITATION Last 

administered on 7/29/21at 14:05;  Start 7/29/21 at 11:15;  Stop 7/30/21 at 

14:14;  Status DC


Thiamine Mononitrate (Vitamin B-1) 100 mg DAILY PO  Last administered on 

8/22/21at 09:03;  Start 7/30/21 at 09:00


Sterile Water (WATER for RESP) 1,000 ml CONT  PRN INH VIA VAPOTHERM DEVICE Last 

administered on 8/21/21at 04:54;  Start 7/29/21 at 19:15


Haloperidol Lactate (Haldol Inj) 5 mg PRN Q6HRS  PRN IVP AGITATION;  Start 

7/30/21 at 14:15


Insulin Human Lispro (HumaLOG) 0-7 UNITS TIDWMEALS SQ ;  Start 7/31/21 at 12:00;

 Stop 8/1/21 at 09:08;  Status DC


Dextrose (Dextrose 50%-Water Syringe) 12.5 gm PRN Q15MIN  PRN IV SEE COMMENTS;  

Start 7/31/21 at 10:15


Potassium Chloride/Dextrose/ Sod Cl 1,000 ml @  100 mls/hr Q10H IV  Last 

administered on 8/1/21at 16:54;  Start 7/31/21 at 10:15;  Stop 8/1/21 at 22:33; 

Status DC


Insulin Glargine (Lantus Syringe) 8 unit DAILY SQ ;  Start 8/1/21 at 09:00;  

Stop 8/1/21 at 08:50;  Status DC


Tocilizumab 800 mg/Sodium Chloride 100 ml @  100 mls/hr 1X  ONCE IV ;  Start 

8/2/21 at 09:00;  Stop 8/2/21 at 09:59;  Status Cancel


Methylprednisolone Sodium Succinate (SOLU-Medrol 40MG VIAL) 60 mg [id number] IV  Last

administered on 8/11/21at 05:45;  Start 8/6/21 at 18:00;  Stop 8/11/21 at 09:54;

 Status DC


Guaifenesin (Mucinex) 1,200 mg BID PO  Last administered on 8/22/21at 09:03;  

Start 8/8/21 at 21:00


Polyethylene Glycol (miraLAX PACKET) 17 gm PRN BID  PRN PO CONSTIPATION;  Start 

8/9/21 at 10:30


Methylprednisolone Sodium Succinate (SOLU-Medrol 125MG VIAL) 60 mg DAILY IV  

Last administered on 8/16/21at 08:53;  Start 8/12/21 at 09:00;  Stop 8/17/21 at 

08:34;  Status DC


Methylprednisolone Sodium Succinate (SOLU-Medrol 125MG VIAL) 40 mg DAILY IV  

Last administered on 8/18/21at 08:55;  Start 8/17/21 at 09:00;  Stop 8/18/21 at 

20:48;  Status DC


Prednisone (Prednisone) 30 mg DAILY PO  Last administered on 8/22/21at 09:02;  

Start 8/19/21 at 09:00





Active Scripts


Active


Reported


Morphine Sulfate 2 Mg/1 Ml Cartridge 2 Mg IV PRN Q4HRS PRN


Zinc Sulfate 50 Mg Tablet 220 Mg PO DAILY


Methylprednisolone Sod Succ 125 Mg Vial 60 Mg IJ Q8HRS


Combivent Respimat Inhal (Ipratropium/Albuterol Sulfate) 4 Gm Aer.w.adap 2 Inh 

IH QID


Pepcid (Famotidine) 20 Mg Tablet 20 Mg PO HS


Lovenox (Enoxaparin Sodium) 30 Mg/0.3 Ml Disp.syrin 30 Mg SQ DAILY


Vitamin D3 ** (Vitamin D) 125 Mcg Capsule 125 Mcg PO DAILY


     5,000 UNITS = 125 MCG


Zyrtec (Cetirizine Hcl) 10 Mg Tablet 1 Tab PO DAILY


Ascorbic Acid 500 Mg Tablet 2 Mg PO DAILY


Acetaminophen 325 Mg Tablet 2 Tab PO PRN Q6HRS PRN 30 Days


No Known Medications Prior To Admisstion (Info)  Each 1 Each 


Vitals/I & O





Vital Sign - Last 24 Hours








 8/21/21 8/21/21 8/21/21 8/21/21





 11:00 15:00 17:37 19:24


 


Temp 97.6 97.8  97.7





 97.6 97.8  97.7


 


Pulse 90 96  114


 


Resp 20 18  18


 


B/P (MAP) 104/60 (75) 113/75 (88)  135/80 (98)


 


Pulse Ox 97 93 97 90


 


O2 Delivery Nasal Cannula Nasal Cannula Nasal Cannula Nasal Cannula


 


O2 Flow Rate 6.0 6.0 6.0 6.0


 


    





    





 8/21/21 8/21/21 8/21/21 8/22/21





 20:00 21:42 22:30 02:43


 


Temp   98.0 97.8





   98.0 97.8


 


Pulse   103 85


 


Resp   18 16


 


B/P (MAP)   135/79 (97) 133/71 (91)


 


Pulse Ox  95 94 97


 


O2 Delivery Nasal Cannula Nasal Cannula Nasal Cannula Nasal Cannula


 


O2 Flow Rate 5.0 5.0 6.0 6.0


 


    





    





 8/22/21 8/22/21  





 06:28 08:00  


 


Temp 98.5   





 98.5   


 


Pulse 82   


 


Resp 18   


 


B/P (MAP) 114/72 (86)   


 


Pulse Ox 97   


 


O2 Delivery Nasal Cannula Nasal Cannula  


 


O2 Flow Rate 6.0 6.0  














Intake and Output   


 


 8/21/21 8/21/21 8/22/21





 15:00 23:00 07:00


 


Intake Total 840 ml 1200 ml 0 ml


 


Output Total 400 ml 600 ml 


 


Balance 440 ml 600 ml 0 ml











Justicifation of Admission Dx:


Justifications for Admission:


Justification of Admission Dx:  Yes


Sepsis:  Hypoxemia











EMMA LORD MD          Aug 22, 2021 10:38

## 2021-08-23 VITALS — SYSTOLIC BLOOD PRESSURE: 106 MMHG | DIASTOLIC BLOOD PRESSURE: 73 MMHG

## 2021-08-23 VITALS — SYSTOLIC BLOOD PRESSURE: 110 MMHG | DIASTOLIC BLOOD PRESSURE: 57 MMHG

## 2021-08-23 VITALS — SYSTOLIC BLOOD PRESSURE: 127 MMHG | DIASTOLIC BLOOD PRESSURE: 85 MMHG

## 2021-08-23 VITALS — DIASTOLIC BLOOD PRESSURE: 59 MMHG | SYSTOLIC BLOOD PRESSURE: 110 MMHG

## 2021-08-23 VITALS — DIASTOLIC BLOOD PRESSURE: 65 MMHG | SYSTOLIC BLOOD PRESSURE: 115 MMHG

## 2021-08-23 RX ADMIN — Medication SCH MG: at 10:56

## 2021-08-23 RX ADMIN — ENOXAPARIN SODIUM SCH MG: 40 INJECTION SUBCUTANEOUS at 20:40

## 2021-08-23 RX ADMIN — IPRATROPIUM BROMIDE AND ALBUTEROL SCH PUFF: 20; 100 SPRAY, METERED RESPIRATORY (INHALATION) at 10:55

## 2021-08-23 RX ADMIN — CETIRIZINE HYDROCHLORIDE SCH MG: 10 TABLET, FILM COATED ORAL at 10:57

## 2021-08-23 RX ADMIN — FAMOTIDINE SCH MG: 20 TABLET ORAL at 20:40

## 2021-08-23 RX ADMIN — ZINC SULFATE CAP 220 MG (50 MG ELEMENTAL ZN) SCH MG: 220 (50 ZN) CAP at 10:55

## 2021-08-23 RX ADMIN — IPRATROPIUM BROMIDE AND ALBUTEROL SCH PUFF: 20; 100 SPRAY, METERED RESPIRATORY (INHALATION) at 13:19

## 2021-08-23 RX ADMIN — IPRATROPIUM BROMIDE AND ALBUTEROL SCH PUFF: 20; 100 SPRAY, METERED RESPIRATORY (INHALATION) at 16:00

## 2021-08-23 RX ADMIN — Medication SCH MG: at 10:55

## 2021-08-23 RX ADMIN — CHOLECALCIFEROL CAP 125 MCG (5000 UNIT) SCH UNIT: 125 CAP at 10:55

## 2021-08-23 RX ADMIN — IPRATROPIUM BROMIDE AND ALBUTEROL SCH PUFF: 20; 100 SPRAY, METERED RESPIRATORY (INHALATION) at 20:00

## 2021-08-23 NOTE — PDOC
PULMONARY PROGRESS NOTES


DATE: 8/23/21 


TIME: 11:02


Subjective


Patient off Vapotherm and now on 4 L nasal cannula.


Vitals





Vital Signs








  Date Time  Temp Pulse Resp B/P (MAP) Pulse Ox O2 Delivery O2 Flow Rate FiO2


 


8/23/21 10:54 98.4       





 98.4       


 


8/23/21 08:00      Nasal Cannula 4.5 


 


8/23/21 07:30  82 20  97   








Comments


Pt. seen during covid -19 pandemic visual exam preformed 


no distress 


RRR


Vapotherm


no obvious rash or edema


General:  Alert, No acute distress


Lungs:  Clear


Medications





Active Scripts








 Medications  Dose


 Route/Sig


 Max Daily Dose Days Date Category Dose


Instructions


 


 Morphine Sulfate


 2 Mg/1 Ml


 Cartridge  2 Mg


 IV PRN Q4HRS PRN


   7/27/21 Reported 


 


 Zinc Sulfate 50


 Mg Tablet  220 Mg


 PO DAILY


   7/27/21 Reported 


 


 Methylprednisolone


 Sod Succ 125 Mg


 Vial  60 Mg


 IJ Q8HRS


   7/27/21 Reported 


 


 Combivent


 Respimat Inhal


  (Ipratropium/Albuterol


 Sulfate) 4 Gm


 Aer.w.adap  2 Inh


 IH QID


   7/27/21 Reported 


 


 Pepcid


  (Famotidine) 20


 Mg Tablet  20 Mg


 PO HS


   7/27/21 Reported 


 


 Lovenox


  (Enoxaparin


 Sodium) 30 Mg/0.3


 Ml Disp.syrin  30 Mg


 SQ DAILY


   7/27/21 Reported 


 


 Vitamin D3 **


  (Vitamin D) 125


 Mcg Capsule  125 Mcg


 PO DAILY


   7/27/21 Reported  5,000 UNITS = 125 MCG


 


 Zyrtec


  (Cetirizine Hcl)


 10 Mg Tablet  1 Tab


 PO DAILY


   7/27/21 Reported 


 


 Ascorbic Acid 500


 Mg Tablet  2 Mg


 PO DAILY


   7/27/21 Reported 


 


 Acetaminophen 325


 Mg Tablet  2 Tab


 PO PRN Q6HRS PRN


  30 7/27/21 Reported 


 


 No Known


 Medications Prior


 To Admisstion


  (Info)  Each  1 Each


 


   8/25/20 Reported 











Impression


.


IMPRESSION:


1.  Acute hypoxemic respiratory failure secondary to COVID-19 viral 

pneumonia./Acute lung injury and early ARDS.  Slowly improving


2.  COVID-19 viral pneumonia.


3.  Abnormal chest x-ray, compatible with viral pneumonia.


4.  Nonspecific anxiety, possible clinical depression





Plan


.





Continue supplemental oxygen to keep sats above 90%.  Currently on 4 L nasal 

cannula.  Patient can discharge home on nasal cannula after 6-minute walk


IS to use multiple times an hr


Patient currently on oral prednisone.  Can discontinue since he already had more

than 10 days


S/P remdesivir/Tocilizumab  


PT/OT


DVT/GI PPX :lovenox 


D/W RN and RT











ASHLEY YEAGER MD                 Aug 23, 2021 11:04

## 2021-08-23 NOTE — PDOC
[employer] PROGRESS NOTE


Date of Service


DOS:


DATE: 8/23/21 


TIME: 11:08





Chief Complaint


Chief Complaint


SEPSIS, was POA


Severe malnutrition was POA, with obesity, BMI 32


Acute hypoxic respiratory failure


COVID-19 pneumonia


transaminitis





History of Present Illness


History of Present Illness


Mr Read is a 56-year-old male with no significant past medical history except 

for degenerative joint disease who was transferred from [facility]  

with complaints of fever, headache, dry cough, shortness of breath for the past 

3 days, admitted on 7/26/2021.  Patient stated that he was actually exposed from

his son he was worsening and required to come to the ED at [facility] and he was

found to be hypoxic and required 2 L of nasal cannula oxygen.  Chest x-ray over 

there showed bilateral mild bibasilar infiltrates.  Covid test was positive and 

patient was transferred to [facility] for pulmonary evaluation and treatment.  Patient 

was started on Remdesivir and IV steroids.  Currently patient is requiring 15 L 

high flow nasal cannula and IV steroids.





7/30: No acute events overnight.  Patient was saturating 92% on 15 L nasal 

cannula and he had desatted down to 70% after he had to blow his nose.  Patient 

was placed on BiPAP at 100% FiO2.  Chest leg appears to be worsening.


7/31: Last dose of remdesivir. Could not tolerate vapotherm and is now on BIPAP.


8/1:  poor Po intake, dry,   dark urine


8/2: Desaturates to 72% when he takes off his BiPAP.  Advised to get back on 

BIPAP after eating. he is able to place himself back on. Frustrated with the 

severity of his hypoxia.


8/3: Status post remdesivir. Still requiring BiPAP 75% FiO2 with significant 

desaturations when he is not wearing assessment with nonrebreather O2.


8/4: Still with significant desaturations when he raises BiPAP even on 15 L high

flow nasal cannula.  Discussed transitioning back to Vapotherm if we can treat 

his anxiety.  He is amenable to this.  He is frustrated with staying in the 

hospital understands he may be a longer term COVID recovery and is amenable to 

LTACH referral.  I discussed with pulmonology.


8/5: Afebrile. 70% FiO2 on BiPAP 12/6.  Feels better.  Did not tolerated 

Vapotherm.  Significant desaturations when he transitions to high flow nasal 

cannula O2 while eating.  Discussed referral to LTACH with pulmonology and ID as

it appears he will need a longer COVID recovery.


8/6: Patient on 70% FiO2.  Via BiPAP 12/6 when seen by pulmonology. Feels better

and is seen on vapotherm 40l/min 100% FiO2 with saturations 89%, tolerating 

well.





8/8/2021


patient seen at edge of bed. FiO2 at 89%. Dropped to low 80s while talking to 

us. Educated patient on conserving energy and working to keep FiO2 higher.


D/W RN


Chart Reviewed





8/9/2021


Patient was seen and examined in bed. FiO2 of 87% while on Vapotherm with 40L of

90% O2.


D/W RN, Chart Review


Patient has not had a bowel movement in two days. 


Patient feels he is doing better but his FiO2 still drops with activity or 

speaking


Discussed need to rest and focus on breathing to maintain proper FiO2.





8/10/2021


Patient was resting in bed with NAD


Seen and examined in bed. FiO2 of 92% while on Vapotherm with 40L of 90% O2.


D/W RN


Chart Review


Discussed need to rest and focus on breathing to maintain proper FiO2





8/11/2021


Patient was awake, alert and NAD


Seen and examine in bed.


FiO2 92% while on Vapotherm with 40L of 90% O2


Discussed that we will continue to monitor for clinical improvement





8/12/2021


Patient resting upon entry into room. Upon exam in bed he was awake, alert and 

NAD


FiO2 90% while on Vapotherm with 40L of 90% O2


Discussed continued monitoring and that he is making slow improvements.


Patient was encouraged by improvements and eager to return to as high of fu

nction as possible.





8/13/2021


Patient awake, alert and on computer.


FiO2 92% while on Vapotherm with 40L of 90% O2


Continuing to make slow improvements


Discussed continuing treatment plan at this time





8/14/2021


Patient resting upon entry into room. Upon exam in bed he was awake, alert and 

NAD


FiO2 90% while on Vapotherm with 40L of 85% O2


We will continue to monitor as he is making slow improvements


Discharge plan will be created as he continues with current treatment





8/15/2021


Patient in prone position upon entry into room. States that he placed himself in

this position after he had read that it would help online. 


Upon exam in bed he was awake, alert and NAD


FiO2 90% while on Vapotherm with 40L of 85% O2


D/W RN, Chart review


We will continue to monitor and discharge disposition in process via social work








8/16/2021


Patient in prone position upon entry into room. States that he placed himself in

this position after he had read that it would help online. 


Upon exam in bed he was awake, alert and NAD


FiO2 90% while on Vapotherm with 40L of 85% O2


D/W RN, Chart review


We will continue to monitor and discharge disposition in process via social work


Patient remains on Vapotherm 40 L, 80%


afebrile 








8/17/2021


Patient in prone position upon entry into room. States that he placed himself in

this position after he had read that it would help online. 


Upon exam in bed he was awake, alert and NAD


FiO2 90% while on Vapotherm with 40L of 85% O2


D/W RN, Chart review


We will continue to monitor and discharge disposition in process via social work


Patient remains on Vapotherm 40 L, 80%


afebrile 


remdesivir/Tocilizumab  


PT/OT


DVT/GI PPX :lovenox 











8/18/2021





Upon exam in bed he was awake, alert and NAD


on Vapotherm with 40L of 94% O2


D/W RN, Chart review


We will continue to monitor and discharge disposition in process via social work


SYMPTOMS SLOW TO RESOLVE


afebrile 


remdesivir/Tocilizumab  


PT/OT


DVT/GI PPX :lovenox 


CXR 8-18 8/19/2021





Upon exam in bed he was awake, alert and NAD


 70%  on Vapotherm with 40L of 94% O2


D/W RN, Chart review


We will continue to monitor and discharge disposition in process via social work


SYMPTOMS SLOW TO RESOLVE


afebrile 


remdesivir/Tocilizumab  


PT/OT


DVT/GI PPX :lovenox 


CXR 8-18 8/20/2021





Upon exam in bed he was DOSING /NAD


 70%  on Vapotherm with 40L of 94% O2


D/W RN, Chart review


We will continue to monitor and discharge disposition in process via social work


SYMPTOMS SLOW TO RESOLVE


afebrile 


remdesivir/Tocilizumab  


PT/OT


DVT/GI PPX :lovenox 


CXR 8-18

















8/21/2021





RESTING IN BED


 70%  on Vapotherm with 40L of 94% O2


D/W RN, Chart review


We will continue to monitor and discharge disposition in process via social work


SYMPTOMS SLOW TO RESOLVE


afebrile 


remdesivir/Tocilizumab  


PT/OT


DVT/GI PPX :lovenox 


CXR 8-18


Once down to 60% FiO2 via Vapotherm, try nasal cannula


IS at bedside 


Continue steroids with taper, on p.o. prednisone now








D/W RN 











8/21/2021





RESTING IN BED


D/W RN, Chart review


We will continue to monitor and discharge disposition in process via social work


SYMPTOMS SLOW TO RESOLVE


afebrile 


remdesivir/Tocilizumab  


PT/OT


DVT/GI PPX :lovenox 


CXR 8-18


IS at bedside 


Continue steroids with taper, on p.o. prednisone now


D/W RN 


currently on 23 lpm and 21% fio2  wean as tolerated,  improving  try NC


IS FREQ PER HOUR














8/22/2021





D/W RN, Chart review


We will continue to monitor and discharge disposition in process via social work


SYMPTOMS SLOW TO RESOLVE 


afebrile 


remdesivir/Tocilizumab  


PT/OT


DVT/GI PPX :lovenox 


CXR 8-18


IS at bedside 


Continue steroids with taper, on p.o. prednisone now


D/W RN 


 improving  try 6 liters  NC


IS FREQ PER HOUR





8/23


Patient seen and examined t bedside.  No major changes or events.  Possible d/c 

today.





Vitals/I&O


Vitals/I&O:





                                   Vital Signs








  Date Time  Temp Pulse Resp B/P (MAP) Pulse Ox O2 Delivery O2 Flow Rate FiO2


 


8/23/21 10:54 98.4       





 98.4       


 


8/23/21 08:00      Nasal Cannula 4.5 


 


8/23/21 07:30  82 20  97   














                                    I & O   


 


 8/22/21 8/22/21 8/23/21





 15:00 23:00 07:00


 


Intake Total 120 ml 180 ml 0 ml


 


Output Total 900 ml 400 ml 200 ml


 


Balance -780 ml -220 ml -200 ml











Physical Exam


Physical Exam:








GEN axo3 male on BIPAP


HEENT:  Normocephalic, atraumatic.  Anicteric. 


NECK:  Supple, no JVD.


LUNGS:  Crackles bilaterally.   


HEART:  S1, S2.  


ABDOMEN:  Soft, nontender, nondistended.  Bowel sounds present.


EXTREMITIES:  No edema, no cyanosis.


DERMATOLOGIC:  Warm, dry, no generalized rash.


NEUROLOGIC:  Alert, oriented, grossly nonfocal.


PSYCHIATRIC:  Calm and cooperative. 


 PIV looks clean.


General:  Alert, Oriented X3, Cooperative, No acute distress


Heart:  Regular rate (tele reg,  reviewed), Normal S1, Normal S2


Lungs:  Clear


Abdomen:  Normal bowel sounds, Soft, No tenderness


Extremities:  No clubbing, No cyanosis, No edema


Skin:  No rashes





Comment


Review of Relevant


I have reviewed the following items genesis (where applicable) has been applied.





Justifications for Admission


Other Justification














TYE JACKSON MD         Aug 23, 2021 11:10

## 2021-08-23 NOTE — NUR
SS following up with discharge planning. SS reviewed pt chart and discussed with pt RN. Pt 
is currently requiring oxygen at rest at six liters nasal canula. COVID19 recovered. PT/OT 
recommended home. Six minute walk completed and pt needing high flow oxygen with exertion. 
Per RT, respiratory not stable for discharge to home at this time. SS will continue to 
follow for discharge planning.

## 2021-08-24 VITALS — DIASTOLIC BLOOD PRESSURE: 62 MMHG | SYSTOLIC BLOOD PRESSURE: 104 MMHG

## 2021-08-24 VITALS — DIASTOLIC BLOOD PRESSURE: 76 MMHG | SYSTOLIC BLOOD PRESSURE: 130 MMHG

## 2021-08-24 VITALS — SYSTOLIC BLOOD PRESSURE: 114 MMHG | DIASTOLIC BLOOD PRESSURE: 70 MMHG

## 2021-08-24 VITALS — SYSTOLIC BLOOD PRESSURE: 124 MMHG | DIASTOLIC BLOOD PRESSURE: 69 MMHG

## 2021-08-24 VITALS — SYSTOLIC BLOOD PRESSURE: 122 MMHG | DIASTOLIC BLOOD PRESSURE: 60 MMHG

## 2021-08-24 RX ADMIN — IPRATROPIUM BROMIDE AND ALBUTEROL SCH PUFF: 20; 100 SPRAY, METERED RESPIRATORY (INHALATION) at 16:00

## 2021-08-24 RX ADMIN — CETIRIZINE HYDROCHLORIDE SCH MG: 10 TABLET, FILM COATED ORAL at 08:45

## 2021-08-24 RX ADMIN — IPRATROPIUM BROMIDE AND ALBUTEROL SCH PUFF: 20; 100 SPRAY, METERED RESPIRATORY (INHALATION) at 08:00

## 2021-08-24 RX ADMIN — Medication SCH MG: at 08:45

## 2021-08-24 RX ADMIN — IPRATROPIUM BROMIDE AND ALBUTEROL SCH PUFF: 20; 100 SPRAY, METERED RESPIRATORY (INHALATION) at 12:00

## 2021-08-24 RX ADMIN — FAMOTIDINE SCH MG: 20 TABLET ORAL at 20:18

## 2021-08-24 RX ADMIN — ZINC SULFATE CAP 220 MG (50 MG ELEMENTAL ZN) SCH MG: 220 (50 ZN) CAP at 08:45

## 2021-08-24 RX ADMIN — CHOLECALCIFEROL CAP 125 MCG (5000 UNIT) SCH UNIT: 125 CAP at 08:45

## 2021-08-24 RX ADMIN — IPRATROPIUM BROMIDE AND ALBUTEROL SCH PUFF: 20; 100 SPRAY, METERED RESPIRATORY (INHALATION) at 20:00

## 2021-08-24 RX ADMIN — ENOXAPARIN SODIUM SCH MG: 40 INJECTION SUBCUTANEOUS at 20:17

## 2021-08-24 NOTE — NUR
SS following up with discharge planning. SS reviewed pt chart and discussed with pt RN. Pt 
is currently requiring oxygen at eight to ten liters nasal canula. COVID19 recovered. PT/OT 
recommended home. Six minute walk completed yesterday and pt needing high flow oxygen with 
exertion. Per RT, respiratory not stable for discharge to home at this time. SS will 
continue to follow for discharge planning.

## 2021-08-24 NOTE — PDOC
[employer] PROGRESS NOTE


Date of Service


DOS:


DATE: 8/24/21 


TIME: 15:10





Chief Complaint


Chief Complaint


SEPSIS, was POA


Severe malnutrition was POA, with obesity, BMI 32


Acute hypoxic respiratory failure


COVID-19 pneumonia


transaminitis





History of Present Illness


History of Present Illness


Mr Read is a 56-year-old male with no significant past medical history except 

for degenerative joint disease who was transferred from [facility]  

with complaints of fever, headache, dry cough, shortness of breath for the past 

3 days, admitted on 7/26/2021.  Patient stated that he was actually exposed from

his son he was worsening and required to come to the ED at [facility] and he was

found to be hypoxic and required 2 L of nasal cannula oxygen.  Chest x-ray over 

there showed bilateral mild bibasilar infiltrates.  Covid test was positive and 

patient was transferred to [facility] for pulmonary evaluation and treatment.  Patient 

was started on Remdesivir and IV steroids.  Currently patient is requiring 15 L 

high flow nasal cannula and IV steroids.





7/30: No acute events overnight.  Patient was saturating 92% on 15 L nasal 

cannula and he had desatted down to 70% after he had to blow his nose.  Patient 

was placed on BiPAP at 100% FiO2.  Chest leg appears to be worsening.


7/31: Last dose of remdesivir. Could not tolerate vapotherm and is now on BIPAP.


8/1:  poor Po intake, dry,   dark urine


8/2: Desaturates to 72% when he takes off his BiPAP.  Advised to get back on 

BIPAP after eating. he is able to place himself back on. Frustrated with the 

severity of his hypoxia.


8/3: Status post remdesivir. Still requiring BiPAP 75% FiO2 with significant 

desaturations when he is not wearing assessment with nonrebreather O2.


8/4: Still with significant desaturations when he raises BiPAP even on 15 L high

flow nasal cannula.  Discussed transitioning back to Vapotherm if we can treat 

his anxiety.  He is amenable to this.  He is frustrated with staying in the 

hospital understands he may be a longer term COVID recovery and is amenable to 

LTACH referral.  I discussed with pulmonology.


8/5: Afebrile. 70% FiO2 on BiPAP 12/6.  Feels better.  Did not tolerated 

Vapotherm.  Significant desaturations when he transitions to high flow nasal 

cannula O2 while eating.  Discussed referral to LTACH with pulmonology and ID as

it appears he will need a longer COVID recovery.


8/6: Patient on 70% FiO2.  Via BiPAP 12/6 when seen by pulmonology. Feels better

and is seen on vapotherm 40l/min 100% FiO2 with saturations 89%, tolerating 

well.





8/8/2021


patient seen at edge of bed. FiO2 at 89%. Dropped to low 80s while talking to 

us. Educated patient on conserving energy and working to keep FiO2 higher.


D/W RN


Chart Reviewed





8/9/2021


Patient was seen and examined in bed. FiO2 of 87% while on Vapotherm with 40L of

90% O2.


D/W RN, Chart Review


Patient has not had a bowel movement in two days. 


Patient feels he is doing better but his FiO2 still drops with activity or 

speaking


Discussed need to rest and focus on breathing to maintain proper FiO2.





8/10/2021


Patient was resting in bed with NAD


Seen and examined in bed. FiO2 of 92% while on Vapotherm with 40L of 90% O2.


D/W RN


Chart Review


Discussed need to rest and focus on breathing to maintain proper FiO2





8/11/2021


Patient was awake, alert and NAD


Seen and examine in bed.


FiO2 92% while on Vapotherm with 40L of 90% O2


Discussed that we will continue to monitor for clinical improvement





8/12/2021


Patient resting upon entry into room. Upon exam in bed he was awake, alert and 

NAD


FiO2 90% while on Vapotherm with 40L of 90% O2


Discussed continued monitoring and that he is making slow improvements.


Patient was encouraged by improvements and eager to return to as high of fu

nction as possible.





8/13/2021


Patient awake, alert and on computer.


FiO2 92% while on Vapotherm with 40L of 90% O2


Continuing to make slow improvements


Discussed continuing treatment plan at this time





8/14/2021


Patient resting upon entry into room. Upon exam in bed he was awake, alert and 

NAD


FiO2 90% while on Vapotherm with 40L of 85% O2


We will continue to monitor as he is making slow improvements


Discharge plan will be created as he continues with current treatment





8/15/2021


Patient in prone position upon entry into room. States that he placed himself in

this position after he had read that it would help online. 


Upon exam in bed he was awake, alert and NAD


FiO2 90% while on Vapotherm with 40L of 85% O2


D/W RN, Chart review


We will continue to monitor and discharge disposition in process via social work








8/16/2021


Patient in prone position upon entry into room. States that he placed himself in

this position after he had read that it would help online. 


Upon exam in bed he was awake, alert and NAD


FiO2 90% while on Vapotherm with 40L of 85% O2


D/W RN, Chart review


We will continue to monitor and discharge disposition in process via social work


Patient remains on Vapotherm 40 L, 80%


afebrile 








8/17/2021


Patient in prone position upon entry into room. States that he placed himself in

this position after he had read that it would help online. 


Upon exam in bed he was awake, alert and NAD


FiO2 90% while on Vapotherm with 40L of 85% O2


D/W RN, Chart review


We will continue to monitor and discharge disposition in process via social work


Patient remains on Vapotherm 40 L, 80%


afebrile 


remdesivir/Tocilizumab  


PT/OT


DVT/GI PPX :lovenox 











8/18/2021





Upon exam in bed he was awake, alert and NAD


on Vapotherm with 40L of 94% O2


D/W RN, Chart review


We will continue to monitor and discharge disposition in process via social work


SYMPTOMS SLOW TO RESOLVE


afebrile 


remdesivir/Tocilizumab  


PT/OT


DVT/GI PPX :lovenox 


CXR 8-18 8/19/2021





Upon exam in bed he was awake, alert and NAD


 70%  on Vapotherm with 40L of 94% O2


D/W RN, Chart review


We will continue to monitor and discharge disposition in process via social work


SYMPTOMS SLOW TO RESOLVE


afebrile 


remdesivir/Tocilizumab  


PT/OT


DVT/GI PPX :lovenox 


CXR 8-18 8/20/2021





Upon exam in bed he was DOSING /NAD


 70%  on Vapotherm with 40L of 94% O2


D/W RN, Chart review


We will continue to monitor and discharge disposition in process via social work


SYMPTOMS SLOW TO RESOLVE


afebrile 


remdesivir/Tocilizumab  


PT/OT


DVT/GI PPX :lovenox 


CXR 8-18

















8/21/2021





RESTING IN BED


 70%  on Vapotherm with 40L of 94% O2


D/W RN, Chart review


We will continue to monitor and discharge disposition in process via social work


SYMPTOMS SLOW TO RESOLVE


afebrile 


remdesivir/Tocilizumab  


PT/OT


DVT/GI PPX :lovenox 


CXR 8-18


Once down to 60% FiO2 via Vapotherm, try nasal cannula


IS at bedside 


Continue steroids with taper, on p.o. prednisone now








D/W RN 











8/21/2021





RESTING IN BED


D/W RN, Chart review


We will continue to monitor and discharge disposition in process via social work


SYMPTOMS SLOW TO RESOLVE


afebrile 


remdesivir/Tocilizumab  


PT/OT


DVT/GI PPX :lovenox 


CXR 8-18


IS at bedside 


Continue steroids with taper, on p.o. prednisone now


D/W RN 


currently on 23 lpm and 21% fio2  wean as tolerated,  improving  try NC


IS FREQ PER HOUR














8/22/2021





D/W RN, Chart review


We will continue to monitor and discharge disposition in process via social work


SYMPTOMS SLOW TO RESOLVE 


afebrile 


remdesivir/Tocilizumab  


PT/OT


DVT/GI PPX :lovenox 


CXR 8-18


IS at bedside 


Continue steroids with taper, on p.o. prednisone now


D/W RN 


 improving  try 6 liters  NC


IS FREQ PER HOUR





8/23


Patient seen and examined t bedside.  No major changes or events.  Possible d/c 

today.





8/24


Patient seen and examined at bedside. Was on 10 L nasal cannula when evaluated. 

Failed 6-minute walk. Will attempt this again tomorrow. Patient agreeable. Plan 

of care discussed with bedside RN. Hopeful discharge tomorrow.





Vitals/I&O


Vitals/I&O:





                                   Vital Signs








  Date Time  Temp Pulse Resp B/P (MAP) Pulse Ox O2 Delivery O2 Flow Rate FiO2


 


8/24/21 15:07 97.8 120 22 124/69 (87) 95 Nasal Cannula 6.0 





 97.8       














                                    I & O   


 


 8/23/21 8/23/21 8/24/21





 15:00 23:00 07:00


 


Intake Total 300 ml 1300 ml 0 ml


 


Output Total  2300 ml 


 


Balance 300 ml -1000 ml 0 ml











Physical Exam


Physical Exam:








GEN axo3 male on BIPAP


HEENT:  Normocephalic, atraumatic.  Anicteric. 


NECK:  Supple, no JVD.


LUNGS:  Crackles bilaterally.   


HEART:  S1, S2.  


ABDOMEN:  Soft, nontender, nondistended.  Bowel sounds present.


EXTREMITIES:  No edema, no cyanosis.


DERMATOLOGIC:  Warm, dry, no generalized rash.


NEUROLOGIC:  Alert, oriented, grossly nonfocal.


PSYCHIATRIC:  Calm and cooperative. 


 PIV looks clean.


General:  Alert, Oriented X3, Cooperative, No acute distress


Heart:  Regular rate (tele reg,  reviewed), Normal S1, Normal S2


Lungs:  Clear


Abdomen:  Normal bowel sounds, Soft, No tenderness


Extremities:  No clubbing, No cyanosis, No edema


Skin:  No rashes





Comment


Review of Relevant


I have reviewed the following items genesis (where applicable) has been applied.





Justifications for Admission


Other Justification














TYE JACKSON MD         Aug 24, 2021 15:11

## 2021-08-24 NOTE — PDOC
PULMONARY PROGRESS NOTES


DATE: 8/24/21 


TIME: 09:52


Subjective


Patient did not past the 6-minute walk test.  He required more than 10 L of 

oxygen.


Patient states he feels better.  He has been sleeping in prone position.  He 

still wants to go home.


Vitals





Vital Signs








  Date Time  Temp Pulse Resp B/P (MAP) Pulse Ox O2 Delivery O2 Flow Rate FiO2


 


8/24/21 09:44     95 Nasal Cannula 10.0 


 


8/23/21 22:29  98 20 127/85 (99)    


 


8/23/21 19:54 97.7       





 97.7       








Comments


Pt. seen during covid -19 pandemic visual exam preformed 


no distress 


RRR


Vapotherm


no obvious rash or edema


General:  Alert, No acute distress


Lungs:  Clear


Medications





Active Scripts








 Medications  Dose


 Route/Sig


 Max Daily Dose Days Date Category Dose


Instructions


 


 Morphine Sulfate


 2 Mg/1 Ml


 Cartridge  2 Mg


 IV PRN Q4HRS PRN


   7/27/21 Reported 


 


 Zinc Sulfate 50


 Mg Tablet  220 Mg


 PO DAILY


   7/27/21 Reported 


 


 Methylprednisolone


 Sod Succ 125 Mg


 Vial  60 Mg


 IJ Q8HRS


   7/27/21 Reported 


 


 Combivent


 Respimat Inhal


  (Ipratropium/Albuterol


 Sulfate) 4 Gm


 Aer.w.adap  2 Inh


 IH QID


   7/27/21 Reported 


 


 Pepcid


  (Famotidine) 20


 Mg Tablet  20 Mg


 PO HS


   7/27/21 Reported 


 


 Lovenox


  (Enoxaparin


 Sodium) 30 Mg/0.3


 Ml Disp.syrin  30 Mg


 SQ DAILY


   7/27/21 Reported 


 


 Vitamin D3 **


  (Vitamin D) 125


 Mcg Capsule  125 Mcg


 PO DAILY


   7/27/21 Reported  5,000 UNITS = 125 MCG


 


 Zyrtec


  (Cetirizine Hcl)


 10 Mg Tablet  1 Tab


 PO DAILY


   7/27/21 Reported 


 


 Ascorbic Acid 500


 Mg Tablet  2 Mg


 PO DAILY


   7/27/21 Reported 


 


 Acetaminophen 325


 Mg Tablet  2 Tab


 PO PRN Q6HRS PRN


  30 7/27/21 Reported 


 


 No Known


 Medications Prior


 To Admisstion


  (Info)  Each  1 Each


 


   8/25/20 Reported 











Impression


.


IMPRESSION:


1.  Acute hypoxemic respiratory failure secondary to COVID-19 viral 

pneumonia./Acute lung injury and early ARDS.  Slowly improving


2.  COVID-19 viral pneumonia.


3.  Abnormal chest x-ray, compatible with viral pneumonia.


4.  Nonspecific anxiety, possible clinical depression





Plan


.





Continue supplemental oxygen to keep sats above 90%.  Currently on 10 L nasal 

cannula.


Repeat 6-minute walk test again tomorrow.  Likely go home on 10 L of oxygen.


IS to use multiple times an hr


Patient currently on oral prednisone.  Can discontinue since he already had more

than 10 days


S/P remdesivir/Tocilizumab  


PT/OT


DVT/GI PPX :lovenox 


D/W RN and RT











ASHLEY YEAGER MD                 Aug 24, 2021 09:53

## 2021-08-25 VITALS — SYSTOLIC BLOOD PRESSURE: 120 MMHG | DIASTOLIC BLOOD PRESSURE: 84 MMHG

## 2021-08-25 VITALS — SYSTOLIC BLOOD PRESSURE: 124 MMHG | DIASTOLIC BLOOD PRESSURE: 79 MMHG

## 2021-08-25 RX ADMIN — Medication SCH MG: at 08:48

## 2021-08-25 RX ADMIN — IPRATROPIUM BROMIDE AND ALBUTEROL SCH PUFF: 20; 100 SPRAY, METERED RESPIRATORY (INHALATION) at 13:14

## 2021-08-25 RX ADMIN — ZINC SULFATE CAP 220 MG (50 MG ELEMENTAL ZN) SCH MG: 220 (50 ZN) CAP at 08:48

## 2021-08-25 RX ADMIN — IPRATROPIUM BROMIDE AND ALBUTEROL SCH PUFF: 20; 100 SPRAY, METERED RESPIRATORY (INHALATION) at 16:00

## 2021-08-25 RX ADMIN — CHOLECALCIFEROL CAP 125 MCG (5000 UNIT) SCH UNIT: 125 CAP at 08:48

## 2021-08-25 RX ADMIN — CETIRIZINE HYDROCHLORIDE SCH MG: 10 TABLET, FILM COATED ORAL at 08:47

## 2021-08-25 RX ADMIN — IPRATROPIUM BROMIDE AND ALBUTEROL SCH PUFF: 20; 100 SPRAY, METERED RESPIRATORY (INHALATION) at 08:50

## 2021-08-25 NOTE — PDOC
PULMONARY PROGRESS NOTES


DATE: 8/25/21 


TIME: 11:04


Subjective


Patient feels better.  Remains on 5 L nasal cannula.


Vitals





Vital Signs








  Date Time  Temp Pulse Resp B/P (MAP) Pulse Ox O2 Delivery O2 Flow Rate FiO2


 


8/25/21 08:30      Nasal Cannula 4.5 


 


8/25/21 07:00 98.7 109 18 120/84 (96) 92   





 98.7       








Comments


Pt. seen during covid -19 pandemic visual exam preformed 


no distress 


RRR


Vapotherm


no obvious rash or edema


General:  Alert, No acute distress


Lungs:  Clear


Medications





Active Scripts








 Medications  Dose


 Route/Sig


 Max Daily Dose Days Date Category Dose


Instructions


 


 Morphine Sulfate


 2 Mg/1 Ml


 Cartridge  2 Mg


 IV PRN Q4HRS PRN


   7/27/21 Reported 


 


 Zinc Sulfate 50


 Mg Tablet  220 Mg


 PO DAILY


   7/27/21 Reported 


 


 Methylprednisolone


 Sod Succ 125 Mg


 Vial  60 Mg


 IJ Q8HRS


   7/27/21 Reported 


 


 Combivent


 Respimat Inhal


  (Ipratropium/Albuterol


 Sulfate) 4 Gm


 Aer.w.adap  2 Inh


 IH QID


   7/27/21 Reported 


 


 Pepcid


  (Famotidine) 20


 Mg Tablet  20 Mg


 PO HS


   7/27/21 Reported 


 


 Lovenox


  (Enoxaparin


 Sodium) 30 Mg/0.3


 Ml Disp.syrin  30 Mg


 SQ DAILY


   7/27/21 Reported 


 


 Vitamin D3 **


  (Vitamin D) 125


 Mcg Capsule  125 Mcg


 PO DAILY


   7/27/21 Reported  5,000 UNITS = 125 MCG


 


 Zyrtec


  (Cetirizine Hcl)


 10 Mg Tablet  1 Tab


 PO DAILY


   7/27/21 Reported 


 


 Ascorbic Acid 500


 Mg Tablet  2 Mg


 PO DAILY


   7/27/21 Reported 


 


 Acetaminophen 325


 Mg Tablet  2 Tab


 PO PRN Q6HRS PRN


  30 7/27/21 Reported 


 


 No Known


 Medications Prior


 To Admisstion


  (Info)  Each  1 Each


 


   8/25/20 Reported 











Impression


.


IMPRESSION:


1.  Acute hypoxemic respiratory failure secondary to COVID-19 viral 

pneumonia./Acute lung injury and early ARDS.  Slowly improving


2.  COVID-19 viral pneumonia.


3.  Abnormal chest x-ray, compatible with viral pneumonia.


4.  Nonspecific anxiety, possible clinical depression





Plan


.





Continue supplemental oxygen to keep sats above 90%.


Repeat 6-minute walk test today.  Likely discharge home today.


IS to use multiple times an hr


Status post treatment with oral steroids.


S/P remdesivir/Tocilizumab  


PT/OT


DVT/GI PPX :lovenox 


D/W RN and RT


Discharge home today.











ASHLEY YEAGER MD                 Aug 25, 2021 11:06

## 2021-08-25 NOTE — NUR
Discharge Note:



TYE ADDISON [address]



Discharge instructions and discharge home medications reviewed with Patient and a copy 
given. All questions have been answered and understanding verbalized. 



The following instructions and handouts were given: oxygen use at home, PNA



IV discontinued, no complications. 



Patient discharged to home with self care on 6 L O2 at rest and 10 L with exertion. patient 
sent home with two oxygen tanks. 



All belongings taken home with patient.

## 2021-08-25 NOTE — NUR
SS following up with discharge planning. SS reviewed pt chart and discussed with pt RN. Pt 
is currently requiring oxygen at six liters nasal canula. COVID19 positive. PT/OT 
recommended home independent. Six minute walk completed and script received for oxygen. 
Script and clinical phoned and faxed to [facility], [phone]; fax [phone]. Oxygen tanks 
provided to pt for home. Pt's RN notified.

## 2021-08-25 NOTE — PDOC3
[facility]-Discharge Summary


Date of Admission:


Date of Admission:  Jul 28, 2021





Date of Discharge:


Date of Discharge:  Aug 25, 2021





Admission Diagnosis:


Problems:  


(1) COVID-19





Discharge Diagnosis:


Discharge Diagnosis:


Same





Consults:


Consults:


Infectious disease, pulmonary





Hospital Course:


Hospital Course:


History of Present Illness


Mr Read is a 56-year-old male with no significant past medical history except 

for degenerative joint disease who was transferred from [facility]  

with complaints of fever, headache, dry cough, shortness of breath for the past 

3 days, admitted on 7/26/2021.  Patient stated that he was actually exposed from

his son he was worsening and required to come to the ED at [facility] and he was

found to be hypoxic and required 2 L of nasal cannula oxygen.  Chest x-ray over 

there showed bilateral mild bibasilar infiltrates.  Covid test was positive and 

patient was transferred to [facility] for pulmonary evaluation and treatment.  Patient 

was started on Remdesivir and IV steroids.  Currently patient is requiring 15 L 

high flow nasal cannula and IV steroids.





7/30: No acute events overnight.  Patient was saturating 92% on 15 L nasal 

cannula and he had desatted down to 70% after he had to blow his nose.  Patient 

was placed on BiPAP at 100% FiO2.  Chest leg appears to be worsening.


7/31: Last dose of remdesivir. Could not tolerate vapotherm and is now on BIPAP.


8/1:  poor Po intake, dry,   dark urine


8/2: Desaturates to 72% when he takes off his BiPAP.  Advised to get back on 

BIPAP after eating. he is able to place himself back on. Frustrated with the 

severity of his hypoxia.


8/3: Status post remdesivir. Still requiring BiPAP 75% FiO2 with significant 

desaturations when he is not wearing assessment with nonrebreather O2.


8/4: Still with significant desaturations when he raises BiPAP even on 15 L high

flow nasal cannula.  Discussed transitioning back to Vapotherm if we can treat 

his anxiety.  He is amenable to this.  He is frustrated with staying in the 

hospital understands he may be a longer term COVID recovery and is amenable to 

LTACH referral.  I discussed with pulmonology.


8/5: Afebrile. 70% FiO2 on BiPAP 12/6.  Feels better.  Did not tolerated 

Vapotherm.  Significant desaturations when he transitions to high flow nasal 

cannula O2 while eating.  Discussed referral to LTACH with pulmonology and ID as

it appears he will need a longer COVID recovery.


8/6: Patient on 70% FiO2.  Via BiPAP 12/6 when seen by pulmonology. Feels better

and is seen on vapotherm 40l/min 100% FiO2 with saturations 89%, tolerating 

well.





8/8/2021


patient seen at edge of bed. FiO2 at 89%. Dropped to low 80s while talking to 

us. Educated patient on conserving energy and working to keep FiO2 higher.


D/W RN


Chart Reviewed





8/9/2021


Patient was seen and examined in bed. FiO2 of 87% while on Vapotherm with 40L of

90% O2.


D/W RN, Chart Review


Patient has not had a bowel movement in two days. 


Patient feels he is doing better but his FiO2 still drops with activity or 

speaking


Discussed need to rest and focus on breathing to maintain proper FiO2.





8/10/2021


Patient was resting in bed with NAD


Seen and examined in bed. FiO2 of 92% while on Vapotherm with 40L of 90% O2.


D/W RN


Chart Review


Discussed need to rest and focus on breathing to maintain proper FiO2





8/11/2021


Patient was awake, alert and NAD


Seen and examine in bed.


FiO2 92% while on Vapotherm with 40L of 90% O2


Discussed that we will continue to monitor for clinical improvement





8/12/2021


Patient resting upon entry into room. Upon exam in bed he was awake, alert and 

NAD


FiO2 90% while on Vapotherm with 40L of 90% O2


Discussed continued monitoring and that he is making slow improvements.


Patient was encouraged by improvements and eager to return to as high of 

function as possible.





8/13/2021


Patient awake, alert and on computer.


FiO2 92% while on Vapotherm with 40L of 90% O2


Continuing to make slow improvements


Discussed continuing treatment plan at this time





8/14/2021


Patient resting upon entry into room. Upon exam in bed he was awake, alert and 

NAD


FiO2 90% while on Vapotherm with 40L of 85% O2


We will continue to monitor as he is making slow improvements


Discharge plan will be created as he continues with current treatment





8/15/2021


Patient in prone position upon entry into room. States that he placed himself in

this position after he had read that it would help online. 


Upon exam in bed he was awake, alert and NAD


FiO2 90% while on Vapotherm with 40L of 85% O2


D/W RN, Chart review


We will continue to monitor and discharge disposition in process via social work








8/16/2021


Patient in prone position upon entry into room. States that he placed himself in

this position after he had read that it would help online. 


Upon exam in bed he was awake, alert and NAD


FiO2 90% while on Vapotherm with 40L of 85% O2


D/W RN, Chart review


We will continue to monitor and discharge disposition in process via social work


Patient remains on Vapotherm 40 L, 80%


afebrile 








8/17/2021


Patient in prone position upon entry into room. States that he placed himself in

this position after he had read that it would help online. 


Upon exam in bed he was awake, alert and NAD


FiO2 90% while on Vapotherm with 40L of 85% O2


D/W RN, Chart review


We will continue to monitor and discharge disposition in process via social work


Patient remains on Vapotherm 40 L, 80%


afebrile 


remdesivir/Tocilizumab  


PT/OT


DVT/GI PPX :lovenox 











8/18/2021





Upon exam in bed he was awake, alert and NAD


on Vapotherm with 40L of 94% O2


D/W RN, Chart review


We will continue to monitor and discharge disposition in process via social work


SYMPTOMS SLOW TO RESOLVE


afebrile 


remdesivir/Tocilizumab  


PT/OT


DVT/GI PPX :lovenox 


CXR 8-18 8/19/2021





Upon exam in bed he was awake, alert and NAD


 70%  on Vapotherm with 40L of 94% O2


D/W RN, Chart review


We will continue to monitor and discharge disposition in process via social work


SYMPTOMS SLOW TO RESOLVE


afebrile 


remdesivir/Tocilizumab  


PT/OT


DVT/GI PPX :lovenox 


CXR 8-18 8/20/2021





Upon exam in bed he was DOSING /NAD


 70%  on Vapotherm with 40L of 94% O2


D/W RN, Chart review


We will continue to monitor and discharge disposition in process via social work


SYMPTOMS SLOW TO RESOLVE


afebrile 


remdesivir/Tocilizumab  


PT/OT


DVT/GI PPX :lovenox 


CXR 8-18

















8/21/2021





RESTING IN BED


 70%  on Vapotherm with 40L of 94% O2


D/W RN, Chart review


We will continue to monitor and discharge disposition in process via social work


SYMPTOMS SLOW TO RESOLVE


afebrile 


remdesivir/Tocilizumab  


PT/OT


DVT/GI PPX :lovenox 


CXR 8-18


Once down to 60% FiO2 via Vapotherm, try nasal cannula


IS at bedside 


Continue steroids with taper, on p.o. prednisone now








D/W RN 











8/21/2021





RESTING IN BED


D/W RN, Chart review


We will continue to monitor and discharge disposition in process via social work


SYMPTOMS SLOW TO RESOLVE


afebrile 


remdesivir/Tocilizumab  


PT/OT


DVT/GI PPX :lovenox 


CXR 8-18


IS at bedside 


Continue steroids with taper, on p.o. prednisone now


D/W RN 


currently on 23 lpm and 21% fio2  wean as tolerated,  improving  try NC


IS FREQ PER HOUR














8/22/2021





D/W RN, Chart review


We will continue to monitor and discharge disposition in process via social work


SYMPTOMS SLOW TO RESOLVE 


afebrile 


remdesivir/Tocilizumab  


PT/OT


DVT/GI PPX :lovenox 


CXR 8-18


IS at bedside 


Continue steroids with taper, on p.o. prednisone now


D/W RN 


 improving  try 6 liters  NC


IS FREQ PER HOUR





8/23


Patient seen and examined t bedside.  No major changes or events.  Possible d/c 

today.





8/24


Patient seen and examined at bedside. Was on 10 L nasal cannula when evaluated. 

Failed 6-minute walk. Will attempt this again tomorrow. Patient agreeable. Plan 

of care discussed with bedside RN. Hopeful discharge tomorrow.





8/25


Patient examined at bedside.  He really wants to go home today.  We will plan 

another 6-minute walk test and if he passes he can discharge.  Plan discussed 

with bedside nurse.





Disposition:


Disposition/Orders:  D/C to Home





Activity:


Activity:


Resume previous activity





Diet:


Diet:  Regular





Medications:


Home Meds


Reported Medications


Morphine Sulfate (MORPHINE SULFATE) 2 Mg/1 Ml Cartridge, 2 MG IV PRN Q4HRS PRN 

for PAIN, EACH


   7/27/21


Zinc Sulfate (Zinc Sulfate) 50 Mg Tablet, 220 MG PO DAILY for   , TAB


   7/27/21


Ipratropium/Albuterol Sulfate (COMBIVENT RESPIMAT INHAL) 4 Gm Aer.w.adap, 2 INH 

IH QID for   , EACH


   7/27/21


Famotidine (PEPCID) 20 Mg Tablet, 20 MG PO HS for   , TAB


   7/27/21


Enoxaparin Sodium (LOVENOX) 30 Mg/0.3 Ml Disp.syrin, 30 MG SQ DAILY for ANTI-CO

AGULANT, DIS.SYR


   7/27/21


Cholecalciferol (Vitamin D3) ** (Vitamin D3 **) 125 Mcg Capsule, 125 MCG PO 

DAILY for SUPPLEMENT, CAP


   5,000 UNITS = 125 MCG


   7/27/21


Cetirizine Hcl (ZYRTEC) 10 Mg Tablet, 1 TAB PO DAILY for   , #30 TAB 2 Refills


   7/27/21


Ascorbic Acid (ASCORBIC ACID) 500 Mg Tablet, 2 MG PO DAILY for   , TAB


   7/27/21


Acetaminophen (ACETAMINOPHEN) 325 Mg Tablet, 2 TAB PO PRN Q6HRS PRN for pain or 

fever for 30 Days, #30 TAB 0 Refills


   7/27/21


Discontinued Reported Medications


Methylprednisolone Sod Succ (METHYLPREDNISOLONE SOD SUCC) 125 Mg Vial, 60 MG IJ 

Q8HRS for   , EACH


   7/27/21


Info (NO KNOWN MEDICATIONS PRIOR TO ADMISSTION)  Each, 1 EACH , EACH


   8/25/20


Scheduled


Ascorbic Acid (Ascorbic Acid), 2 MG PO DAILY, (Reported)


Cetirizine Hcl (Zyrtec), 1 TAB PO DAILY, (Reported)


Cholecalciferol (Vitamin D3) ** (Vitamin D3 **), 125 MCG PO DAILY, (Reported)


Enoxaparin Sodium (Lovenox), 30 MG SQ DAILY, (Reported)


Famotidine (Pepcid), 20 MG PO HS, (Reported)


Ipratropium/Albuterol Sulfate (Combivent Respimat Inhal), 2 INH IH QID, 

(Reported)


Zinc Sulfate (Zinc Sulfate), 220 MG PO DAILY, (Reported)





Scheduled PRN


Acetaminophen (Acetaminophen), 2 TAB PO PRN Q6HRS PRN for pain or fever, 

(Reported)


Morphine Sulfate (Morphine Sulfate), 2 MG IV PRN Q4HRS PRN for PAIN, (Reported)





Discontinued Medications


Info (No Known Medications Prior To Admisstion), 1 EACH , (Reported)


Methylprednisolone Sod Succ (Methylprednisolone Sod Succ), 60 MG IJ Q8HRS, 

(Reported)





Justicifation of Admission Dx:


Justifications for Admission:


Justification of Admission Dx:  Yes


Sepsis:  Hypoxemia











TYE JACKSON MD         Aug 25, 2021 12:28